# Patient Record
Sex: FEMALE | Race: WHITE | NOT HISPANIC OR LATINO | Employment: OTHER | URBAN - METROPOLITAN AREA
[De-identification: names, ages, dates, MRNs, and addresses within clinical notes are randomized per-mention and may not be internally consistent; named-entity substitution may affect disease eponyms.]

---

## 2020-04-17 RX ORDER — TRIAMCINOLONE ACETONIDE 5 MG/G
CREAM TOPICAL
Qty: 15 G | Refills: 0 | Status: SHIPPED | OUTPATIENT
Start: 2020-04-17

## 2021-01-01 ENCOUNTER — HOSPITAL ENCOUNTER (INPATIENT)
Facility: HOSPITAL | Age: 71
LOS: 1 days | End: 2021-12-09
Attending: INTERNAL MEDICINE | Admitting: FAMILY MEDICINE

## 2021-01-01 ENCOUNTER — APPOINTMENT (OUTPATIENT)
Dept: GENERAL RADIOLOGY | Facility: HOSPITAL | Age: 71
End: 2021-01-01

## 2021-01-01 ENCOUNTER — APPOINTMENT (OUTPATIENT)
Dept: CARDIOLOGY | Facility: HOSPITAL | Age: 71
End: 2021-01-01

## 2021-01-01 ENCOUNTER — APPOINTMENT (OUTPATIENT)
Dept: CT IMAGING | Facility: HOSPITAL | Age: 71
End: 2021-01-01

## 2021-01-01 ENCOUNTER — APPOINTMENT (OUTPATIENT)
Dept: MRI IMAGING | Facility: HOSPITAL | Age: 71
End: 2021-01-01

## 2021-01-01 ENCOUNTER — HOSPITAL ENCOUNTER (INPATIENT)
Facility: HOSPITAL | Age: 71
LOS: 9 days | Discharge: HOSPICE | End: 2021-12-08
Attending: INTERNAL MEDICINE | Admitting: INTERNAL MEDICINE

## 2021-01-01 ENCOUNTER — APPOINTMENT (OUTPATIENT)
Dept: ULTRASOUND IMAGING | Facility: HOSPITAL | Age: 71
End: 2021-01-01

## 2021-01-01 VITALS — BODY MASS INDEX: 39.08 KG/M2 | WEIGHT: 249 LBS | OXYGEN SATURATION: 72 %

## 2021-01-01 VITALS
BODY MASS INDEX: 39.24 KG/M2 | SYSTOLIC BLOOD PRESSURE: 127 MMHG | HEIGHT: 67 IN | RESPIRATION RATE: 28 BRPM | TEMPERATURE: 97.5 F | DIASTOLIC BLOOD PRESSURE: 91 MMHG | OXYGEN SATURATION: 91 % | HEART RATE: 80 BPM | WEIGHT: 250 LBS

## 2021-01-01 DIAGNOSIS — U07.1 COVID-19 VIRUS DETECTED: ICD-10-CM

## 2021-01-01 DIAGNOSIS — R13.11 ORAL PHASE DYSPHAGIA: ICD-10-CM

## 2021-01-01 DIAGNOSIS — R47.1 DYSARTHRIA: Primary | ICD-10-CM

## 2021-01-01 LAB
ALBUMIN SERPL-MCNC: 2.3 G/DL (ref 3.5–5.2)
ALBUMIN SERPL-MCNC: 2.5 G/DL (ref 3.5–5.2)
ALBUMIN SERPL-MCNC: 2.5 G/DL (ref 3.5–5.2)
ALBUMIN SERPL-MCNC: 2.9 G/DL (ref 3.5–5.2)
ALBUMIN SERPL-MCNC: 3 G/DL (ref 3.5–5.2)
ALBUMIN/GLOB SERPL: 0.6 G/DL
ALBUMIN/GLOB SERPL: 0.7 G/DL
ALBUMIN/GLOB SERPL: 0.8 G/DL
ALBUMIN/GLOB SERPL: 0.8 G/DL
ALP SERPL-CCNC: 104 U/L (ref 39–117)
ALP SERPL-CCNC: 108 U/L (ref 39–117)
ALP SERPL-CCNC: 119 U/L (ref 39–117)
ALP SERPL-CCNC: 121 U/L (ref 39–117)
ALP SERPL-CCNC: 121 U/L (ref 39–117)
ALT SERPL W P-5'-P-CCNC: 11 U/L (ref 1–33)
ALT SERPL W P-5'-P-CCNC: 14 U/L (ref 1–33)
ALT SERPL W P-5'-P-CCNC: 26 U/L (ref 1–33)
ALT SERPL W P-5'-P-CCNC: 27 U/L (ref 1–33)
ALT SERPL W P-5'-P-CCNC: 27 U/L (ref 1–33)
AMPHET+METHAMPHET UR QL: NEGATIVE
AMPHETAMINES UR QL: NEGATIVE
ANION GAP SERPL CALCULATED.3IONS-SCNC: 10 MMOL/L (ref 5–15)
ANION GAP SERPL CALCULATED.3IONS-SCNC: 10 MMOL/L (ref 5–15)
ANION GAP SERPL CALCULATED.3IONS-SCNC: 11 MMOL/L (ref 5–15)
ANION GAP SERPL CALCULATED.3IONS-SCNC: 13 MMOL/L (ref 5–15)
ANION GAP SERPL CALCULATED.3IONS-SCNC: 13 MMOL/L (ref 5–15)
ANION GAP SERPL CALCULATED.3IONS-SCNC: 14 MMOL/L (ref 5–15)
ANION GAP SERPL CALCULATED.3IONS-SCNC: 18 MMOL/L (ref 5–15)
ANION GAP SERPL CALCULATED.3IONS-SCNC: 9 MMOL/L (ref 5–15)
APTT PPP: 35.6 SECONDS (ref 22–39)
ASCENDING AORTA: 3.2 CM
AST SERPL-CCNC: 20 U/L (ref 1–32)
AST SERPL-CCNC: 21 U/L (ref 1–32)
AST SERPL-CCNC: 88 U/L (ref 1–32)
B PARAPERT DNA SPEC QL NAA+PROBE: NOT DETECTED
B PERT DNA SPEC QL NAA+PROBE: NOT DETECTED
BACTERIA SPEC AEROBE CULT: NORMAL
BACTERIA UR QL AUTO: ABNORMAL /HPF
BARBITURATES UR QL SCN: NEGATIVE
BASOPHILS # BLD AUTO: 0.01 10*3/MM3 (ref 0–0.2)
BASOPHILS # BLD AUTO: 0.01 10*3/MM3 (ref 0–0.2)
BASOPHILS # BLD MANUAL: 0 10*3/MM3 (ref 0–0.2)
BASOPHILS NFR BLD AUTO: 0.1 % (ref 0–1.5)
BASOPHILS NFR BLD AUTO: 0.3 % (ref 0–1.5)
BASOPHILS NFR BLD MANUAL: 0 % (ref 0–1.5)
BENZODIAZ UR QL SCN: NEGATIVE
BH CV ECHO MEAS - AO MAX PG (FULL): 5.1 MMHG
BH CV ECHO MEAS - AO MAX PG: 7.3 MMHG
BH CV ECHO MEAS - AO MEAN PG (FULL): 2.2 MMHG
BH CV ECHO MEAS - AO MEAN PG: 3.4 MMHG
BH CV ECHO MEAS - AO ROOT AREA (BSA CORRECTED): 1.4
BH CV ECHO MEAS - AO ROOT AREA: 8.3 CM^2
BH CV ECHO MEAS - AO ROOT DIAM: 3.3 CM
BH CV ECHO MEAS - AO V2 MAX: 134.7 CM/SEC
BH CV ECHO MEAS - AO V2 MEAN: 81.4 CM/SEC
BH CV ECHO MEAS - AO V2 VTI: 24.6 CM
BH CV ECHO MEAS - ASC AORTA: 3.2 CM
BH CV ECHO MEAS - AVA(I,A): 2 CM^2
BH CV ECHO MEAS - AVA(I,D): 2 CM^2
BH CV ECHO MEAS - AVA(V,A): 2 CM^2
BH CV ECHO MEAS - AVA(V,D): 2 CM^2
BH CV ECHO MEAS - BSA(HAYCOCK): 2.5 M^2
BH CV ECHO MEAS - BSA: 2.3 M^2
BH CV ECHO MEAS - BZI_BMI: 42.3 KILOGRAMS/M^2
BH CV ECHO MEAS - BZI_METRIC_HEIGHT: 170.2 CM
BH CV ECHO MEAS - BZI_METRIC_WEIGHT: 122.5 KG
BH CV ECHO MEAS - EDV(CUBED): 167.3 ML
BH CV ECHO MEAS - EDV(MOD-SP2): 156 ML
BH CV ECHO MEAS - EDV(MOD-SP4): 136 ML
BH CV ECHO MEAS - EDV(TEICH): 148 ML
BH CV ECHO MEAS - EF(CUBED): 62.3 %
BH CV ECHO MEAS - EF(MOD-BP): 54 %
BH CV ECHO MEAS - EF(MOD-SP2): 59 %
BH CV ECHO MEAS - EF(MOD-SP4): 45.6 %
BH CV ECHO MEAS - EF(TEICH): 53.3 %
BH CV ECHO MEAS - ESV(CUBED): 63 ML
BH CV ECHO MEAS - ESV(MOD-SP2): 64 ML
BH CV ECHO MEAS - ESV(MOD-SP4): 74 ML
BH CV ECHO MEAS - ESV(TEICH): 69.1 ML
BH CV ECHO MEAS - FS: 27.8 %
BH CV ECHO MEAS - IVS/LVPW: 0.8
BH CV ECHO MEAS - IVSD: 1 CM
BH CV ECHO MEAS - LA DIMENSION: 4.4 CM
BH CV ECHO MEAS - LA/AO: 1.4
BH CV ECHO MEAS - LAD MAJOR: 5.4 CM
BH CV ECHO MEAS - LAT PEAK E' VEL: 9.7 CM/SEC
BH CV ECHO MEAS - LATERAL E/E' RATIO: 10.8
BH CV ECHO MEAS - LV DIASTOLIC VOL/BSA (35-75): 59.2 ML/M^2
BH CV ECHO MEAS - LV IVRT: 0.12 SEC
BH CV ECHO MEAS - LV MASS(C)D: 255.1 GRAMS
BH CV ECHO MEAS - LV MASS(C)DI: 111.1 GRAMS/M^2
BH CV ECHO MEAS - LV MAX PG: 2.1 MMHG
BH CV ECHO MEAS - LV MEAN PG: 1.1 MMHG
BH CV ECHO MEAS - LV SYSTOLIC VOL/BSA (12-30): 32.2 ML/M^2
BH CV ECHO MEAS - LV V1 MAX: 72.6 CM/SEC
BH CV ECHO MEAS - LV V1 MEAN: 50.3 CM/SEC
BH CV ECHO MEAS - LV V1 VTI: 13.5 CM
BH CV ECHO MEAS - LVIDD: 5.5 CM
BH CV ECHO MEAS - LVIDS: 4 CM
BH CV ECHO MEAS - LVLD AP2: 9.4 CM
BH CV ECHO MEAS - LVLD AP4: 8.1 CM
BH CV ECHO MEAS - LVLS AP2: 7.7 CM
BH CV ECHO MEAS - LVLS AP4: 7.3 CM
BH CV ECHO MEAS - LVOT AREA (M): 3.8 CM^2
BH CV ECHO MEAS - LVOT AREA: 3.7 CM^2
BH CV ECHO MEAS - LVOT DIAM: 2.2 CM
BH CV ECHO MEAS - LVPWD: 1.3 CM
BH CV ECHO MEAS - MED PEAK E' VEL: 3.9 CM/SEC
BH CV ECHO MEAS - MEDIAL E/E' RATIO: 25.9
BH CV ECHO MEAS - MV A MAX VEL: 62.7 CM/SEC
BH CV ECHO MEAS - MV DEC SLOPE: 374.9 CM/SEC^2
BH CV ECHO MEAS - MV DEC TIME: 0.17 SEC
BH CV ECHO MEAS - MV E MAX VEL: 105.1 CM/SEC
BH CV ECHO MEAS - MV E/A: 1.7
BH CV ECHO MEAS - MV MAX PG: 3.7 MMHG
BH CV ECHO MEAS - MV MEAN PG: 1.6 MMHG
BH CV ECHO MEAS - MV P1/2T MAX VEL: 104.2 CM/SEC
BH CV ECHO MEAS - MV P1/2T: 81.4 MSEC
BH CV ECHO MEAS - MV V2 MAX: 95.6 CM/SEC
BH CV ECHO MEAS - MV V2 MEAN: 59.5 CM/SEC
BH CV ECHO MEAS - MV V2 VTI: 29.5 CM
BH CV ECHO MEAS - MVA P1/2T LCG: 2.1 CM^2
BH CV ECHO MEAS - MVA(P1/2T): 2.7 CM^2
BH CV ECHO MEAS - MVA(VTI): 1.7 CM^2
BH CV ECHO MEAS - PA ACC SLOPE: 883.4 CM/SEC^2
BH CV ECHO MEAS - PA ACC TIME: 0.1 SEC
BH CV ECHO MEAS - PA MAX PG: 3.8 MMHG
BH CV ECHO MEAS - PA PR(ACCEL): 33.8 MMHG
BH CV ECHO MEAS - PA V2 MAX: 97.7 CM/SEC
BH CV ECHO MEAS - RAP SYSTOLE: 15 MMHG
BH CV ECHO MEAS - RVSP: 53 MMHG
BH CV ECHO MEAS - SI(AO): 89.6 ML/M^2
BH CV ECHO MEAS - SI(CUBED): 45.4 ML/M^2
BH CV ECHO MEAS - SI(LVOT): 21.6 ML/M^2
BH CV ECHO MEAS - SI(MOD-SP2): 40 ML/M^2
BH CV ECHO MEAS - SI(MOD-SP4): 27 ML/M^2
BH CV ECHO MEAS - SI(TEICH): 34.4 ML/M^2
BH CV ECHO MEAS - SV(AO): 205.8 ML
BH CV ECHO MEAS - SV(CUBED): 104.3 ML
BH CV ECHO MEAS - SV(LVOT): 49.6 ML
BH CV ECHO MEAS - SV(MOD-SP2): 92 ML
BH CV ECHO MEAS - SV(MOD-SP4): 62 ML
BH CV ECHO MEAS - SV(TEICH): 78.9 ML
BH CV ECHO MEAS - TAPSE (>1.6): 1.6 CM
BH CV ECHO MEAS - TR MAX PG: 38 MMHG
BH CV ECHO MEAS - TR MAX VEL: 308 CM/SEC
BH CV ECHO MEASUREMENTS AVERAGE E/E' RATIO: 15.46
BH CV VAS BP RIGHT ARM: NORMAL MMHG
BH CV XLRA - RV BASE: 3.4 CM
BH CV XLRA - RV LENGTH: 6.3 CM
BH CV XLRA - RV MID: 2.9 CM
BH CV XLRA - TDI S': 6.6 CM/SEC
BILIRUB CONJ SERPL-MCNC: 0.5 MG/DL (ref 0–0.3)
BILIRUB CONJ SERPL-MCNC: 0.7 MG/DL (ref 0–0.3)
BILIRUB INDIRECT SERPL-MCNC: 0.5 MG/DL
BILIRUB SERPL-MCNC: 0.6 MG/DL (ref 0–1.2)
BILIRUB SERPL-MCNC: 0.6 MG/DL (ref 0–1.2)
BILIRUB SERPL-MCNC: 1 MG/DL (ref 0–1.2)
BILIRUB SERPL-MCNC: 1 MG/DL (ref 0–1.2)
BILIRUB SERPL-MCNC: 1.3 MG/DL (ref 0–1.2)
BILIRUB UR QL STRIP: NEGATIVE
BUN SERPL-MCNC: 31 MG/DL (ref 8–23)
BUN SERPL-MCNC: 32 MG/DL (ref 8–23)
BUN SERPL-MCNC: 38 MG/DL (ref 8–23)
BUN SERPL-MCNC: 42 MG/DL (ref 8–23)
BUN SERPL-MCNC: 43 MG/DL (ref 8–23)
BUN SERPL-MCNC: 49 MG/DL (ref 8–23)
BUN SERPL-MCNC: 64 MG/DL (ref 8–23)
BUN SERPL-MCNC: 67 MG/DL (ref 8–23)
BUN/CREAT SERPL: 20.2 (ref 7–25)
BUN/CREAT SERPL: 20.3 (ref 7–25)
BUN/CREAT SERPL: 21.1 (ref 7–25)
BUN/CREAT SERPL: 24.3 (ref 7–25)
BUN/CREAT SERPL: 24.6 (ref 7–25)
BUN/CREAT SERPL: 25.5 (ref 7–25)
BUN/CREAT SERPL: 27.9 (ref 7–25)
BUN/CREAT SERPL: 29.5 (ref 7–25)
BUPRENORPHINE SERPL-MCNC: NEGATIVE NG/ML
C PNEUM DNA NPH QL NAA+NON-PROBE: NOT DETECTED
CALCIUM SPEC-SCNC: 7.9 MG/DL (ref 8.6–10.5)
CALCIUM SPEC-SCNC: 8 MG/DL (ref 8.6–10.5)
CALCIUM SPEC-SCNC: 8.1 MG/DL (ref 8.6–10.5)
CALCIUM SPEC-SCNC: 8.1 MG/DL (ref 8.6–10.5)
CALCIUM SPEC-SCNC: 8.2 MG/DL (ref 8.6–10.5)
CALCIUM SPEC-SCNC: 8.2 MG/DL (ref 8.6–10.5)
CALCIUM SPEC-SCNC: 8.5 MG/DL (ref 8.6–10.5)
CALCIUM SPEC-SCNC: 8.6 MG/DL (ref 8.6–10.5)
CANNABINOIDS SERPL QL: NEGATIVE
CHLORIDE SERPL-SCNC: 101 MMOL/L (ref 98–107)
CHLORIDE SERPL-SCNC: 102 MMOL/L (ref 98–107)
CHLORIDE SERPL-SCNC: 102 MMOL/L (ref 98–107)
CHLORIDE SERPL-SCNC: 103 MMOL/L (ref 98–107)
CHLORIDE SERPL-SCNC: 105 MMOL/L (ref 98–107)
CHLORIDE SERPL-SCNC: 106 MMOL/L (ref 98–107)
CHLORIDE SERPL-SCNC: 109 MMOL/L (ref 98–107)
CHLORIDE SERPL-SCNC: 112 MMOL/L (ref 98–107)
CHOLEST SERPL-MCNC: 135 MG/DL (ref 0–200)
CLARITY UR: CLEAR
CO2 SERPL-SCNC: 20 MMOL/L (ref 22–29)
CO2 SERPL-SCNC: 21 MMOL/L (ref 22–29)
CO2 SERPL-SCNC: 21 MMOL/L (ref 22–29)
CO2 SERPL-SCNC: 23 MMOL/L (ref 22–29)
CO2 SERPL-SCNC: 24 MMOL/L (ref 22–29)
CO2 SERPL-SCNC: 26 MMOL/L (ref 22–29)
COCAINE UR QL: NEGATIVE
COLOR UR: YELLOW
CREAT SERPL-MCNC: 1.47 MG/DL (ref 0.57–1)
CREAT SERPL-MCNC: 1.54 MG/DL (ref 0.57–1)
CREAT SERPL-MCNC: 1.58 MG/DL (ref 0.57–1)
CREAT SERPL-MCNC: 1.66 MG/DL (ref 0.57–1)
CREAT SERPL-MCNC: 1.73 MG/DL (ref 0.57–1)
CREAT SERPL-MCNC: 1.88 MG/DL (ref 0.57–1)
CREAT SERPL-MCNC: 2.51 MG/DL (ref 0.57–1)
CREAT SERPL-MCNC: 2.59 MG/DL (ref 0.57–1)
CREAT SERPL-MCNC: 2.72 MG/DL (ref 0.57–1)
CREAT UR-MCNC: 130.3 MG/DL
CRP SERPL-MCNC: 2.11 MG/DL (ref 0–0.5)
CRP SERPL-MCNC: 21.12 MG/DL (ref 0–0.5)
CRP SERPL-MCNC: 3.21 MG/DL (ref 0–0.5)
CRP SERPL-MCNC: 31.46 MG/DL (ref 0–0.5)
CRP SERPL-MCNC: 5.83 MG/DL (ref 0–0.5)
D DIMER PPP FEU-MCNC: 1.08 MCGFEU/ML (ref 0–0.56)
D DIMER PPP FEU-MCNC: 1.7 MCGFEU/ML (ref 0–0.56)
D-LACTATE SERPL-SCNC: 1.5 MMOL/L (ref 0.5–2)
DEPRECATED RDW RBC AUTO: 50.4 FL (ref 37–54)
DEPRECATED RDW RBC AUTO: 50.4 FL (ref 37–54)
DEPRECATED RDW RBC AUTO: 50.5 FL (ref 37–54)
DEPRECATED RDW RBC AUTO: 52 FL (ref 37–54)
DEPRECATED RDW RBC AUTO: 52.9 FL (ref 37–54)
DEPRECATED RDW RBC AUTO: 53.3 FL (ref 37–54)
DEPRECATED RDW RBC AUTO: 54 FL (ref 37–54)
EOSINOPHIL # BLD AUTO: 0.01 10*3/MM3 (ref 0–0.4)
EOSINOPHIL # BLD AUTO: 0.12 10*3/MM3 (ref 0–0.4)
EOSINOPHIL # BLD MANUAL: 0 10*3/MM3 (ref 0–0.4)
EOSINOPHIL NFR BLD AUTO: 0.1 % (ref 0.3–6.2)
EOSINOPHIL NFR BLD AUTO: 3.1 % (ref 0.3–6.2)
EOSINOPHIL NFR BLD MANUAL: 0 % (ref 0.3–6.2)
ERYTHROCYTE [DISTWIDTH] IN BLOOD BY AUTOMATED COUNT: 16.7 % (ref 12.3–15.4)
ERYTHROCYTE [DISTWIDTH] IN BLOOD BY AUTOMATED COUNT: 16.9 % (ref 12.3–15.4)
ERYTHROCYTE [DISTWIDTH] IN BLOOD BY AUTOMATED COUNT: 16.9 % (ref 12.3–15.4)
ERYTHROCYTE [DISTWIDTH] IN BLOOD BY AUTOMATED COUNT: 17.1 % (ref 12.3–15.4)
ERYTHROCYTE [DISTWIDTH] IN BLOOD BY AUTOMATED COUNT: 17.4 % (ref 12.3–15.4)
ERYTHROCYTE [SEDIMENTATION RATE] IN BLOOD: 37 MM/HR (ref 0–30)
FERRITIN SERPL-MCNC: 457.2 NG/ML (ref 13–150)
FLUAV SUBTYP SPEC NAA+PROBE: NOT DETECTED
FLUBV RNA ISLT QL NAA+PROBE: NOT DETECTED
GFR SERPL CREATININE-BSD FRML MDRD: 17 ML/MIN/1.73
GFR SERPL CREATININE-BSD FRML MDRD: 18 ML/MIN/1.73
GFR SERPL CREATININE-BSD FRML MDRD: 19 ML/MIN/1.73
GFR SERPL CREATININE-BSD FRML MDRD: 26 ML/MIN/1.73
GFR SERPL CREATININE-BSD FRML MDRD: 29 ML/MIN/1.73
GFR SERPL CREATININE-BSD FRML MDRD: 30 ML/MIN/1.73
GFR SERPL CREATININE-BSD FRML MDRD: 32 ML/MIN/1.73
GFR SERPL CREATININE-BSD FRML MDRD: 33 ML/MIN/1.73
GFR SERPL CREATININE-BSD FRML MDRD: 35 ML/MIN/1.73
GLOBULIN UR ELPH-MCNC: 3.6 GM/DL
GLOBULIN UR ELPH-MCNC: 3.9 GM/DL
GLUCOSE BLDC GLUCOMTR-MCNC: 102 MG/DL (ref 70–130)
GLUCOSE BLDC GLUCOMTR-MCNC: 120 MG/DL (ref 70–130)
GLUCOSE BLDC GLUCOMTR-MCNC: 127 MG/DL (ref 70–130)
GLUCOSE BLDC GLUCOMTR-MCNC: 132 MG/DL (ref 70–130)
GLUCOSE BLDC GLUCOMTR-MCNC: 133 MG/DL (ref 70–130)
GLUCOSE BLDC GLUCOMTR-MCNC: 143 MG/DL (ref 70–130)
GLUCOSE BLDC GLUCOMTR-MCNC: 152 MG/DL (ref 70–130)
GLUCOSE BLDC GLUCOMTR-MCNC: 154 MG/DL (ref 70–130)
GLUCOSE BLDC GLUCOMTR-MCNC: 155 MG/DL (ref 70–130)
GLUCOSE BLDC GLUCOMTR-MCNC: 155 MG/DL (ref 70–130)
GLUCOSE BLDC GLUCOMTR-MCNC: 165 MG/DL (ref 70–130)
GLUCOSE BLDC GLUCOMTR-MCNC: 170 MG/DL (ref 70–130)
GLUCOSE BLDC GLUCOMTR-MCNC: 171 MG/DL (ref 70–130)
GLUCOSE BLDC GLUCOMTR-MCNC: 173 MG/DL (ref 70–130)
GLUCOSE BLDC GLUCOMTR-MCNC: 175 MG/DL (ref 70–130)
GLUCOSE BLDC GLUCOMTR-MCNC: 176 MG/DL (ref 70–130)
GLUCOSE BLDC GLUCOMTR-MCNC: 176 MG/DL (ref 70–130)
GLUCOSE BLDC GLUCOMTR-MCNC: 185 MG/DL (ref 70–130)
GLUCOSE BLDC GLUCOMTR-MCNC: 187 MG/DL (ref 70–130)
GLUCOSE BLDC GLUCOMTR-MCNC: 188 MG/DL (ref 70–130)
GLUCOSE BLDC GLUCOMTR-MCNC: 192 MG/DL (ref 70–130)
GLUCOSE BLDC GLUCOMTR-MCNC: 195 MG/DL (ref 70–130)
GLUCOSE BLDC GLUCOMTR-MCNC: 196 MG/DL (ref 70–130)
GLUCOSE BLDC GLUCOMTR-MCNC: 199 MG/DL (ref 70–130)
GLUCOSE BLDC GLUCOMTR-MCNC: 202 MG/DL (ref 70–130)
GLUCOSE BLDC GLUCOMTR-MCNC: 205 MG/DL (ref 70–130)
GLUCOSE BLDC GLUCOMTR-MCNC: 206 MG/DL (ref 70–130)
GLUCOSE BLDC GLUCOMTR-MCNC: 206 MG/DL (ref 70–130)
GLUCOSE BLDC GLUCOMTR-MCNC: 209 MG/DL (ref 70–130)
GLUCOSE BLDC GLUCOMTR-MCNC: 210 MG/DL (ref 70–130)
GLUCOSE BLDC GLUCOMTR-MCNC: 212 MG/DL (ref 70–130)
GLUCOSE BLDC GLUCOMTR-MCNC: 214 MG/DL (ref 70–130)
GLUCOSE BLDC GLUCOMTR-MCNC: 217 MG/DL (ref 70–130)
GLUCOSE BLDC GLUCOMTR-MCNC: 220 MG/DL (ref 70–130)
GLUCOSE BLDC GLUCOMTR-MCNC: 221 MG/DL (ref 70–130)
GLUCOSE BLDC GLUCOMTR-MCNC: 224 MG/DL (ref 70–130)
GLUCOSE BLDC GLUCOMTR-MCNC: 230 MG/DL (ref 70–130)
GLUCOSE BLDC GLUCOMTR-MCNC: 232 MG/DL (ref 70–130)
GLUCOSE BLDC GLUCOMTR-MCNC: 249 MG/DL (ref 70–130)
GLUCOSE BLDC GLUCOMTR-MCNC: 93 MG/DL (ref 70–130)
GLUCOSE SERPL-MCNC: 118 MG/DL (ref 65–99)
GLUCOSE SERPL-MCNC: 152 MG/DL (ref 65–99)
GLUCOSE SERPL-MCNC: 178 MG/DL (ref 65–99)
GLUCOSE SERPL-MCNC: 182 MG/DL (ref 65–99)
GLUCOSE SERPL-MCNC: 183 MG/DL (ref 65–99)
GLUCOSE SERPL-MCNC: 185 MG/DL (ref 65–99)
GLUCOSE SERPL-MCNC: 198 MG/DL (ref 65–99)
GLUCOSE SERPL-MCNC: 236 MG/DL (ref 65–99)
GLUCOSE UR STRIP-MCNC: ABNORMAL MG/DL
HADV DNA SPEC NAA+PROBE: NOT DETECTED
HBA1C MFR BLD: 8.3 % (ref 4.8–5.6)
HCOV 229E RNA SPEC QL NAA+PROBE: NOT DETECTED
HCOV HKU1 RNA SPEC QL NAA+PROBE: NOT DETECTED
HCOV NL63 RNA SPEC QL NAA+PROBE: NOT DETECTED
HCOV OC43 RNA SPEC QL NAA+PROBE: NOT DETECTED
HCT VFR BLD AUTO: 27.6 % (ref 34–46.6)
HCT VFR BLD AUTO: 28.6 % (ref 34–46.6)
HCT VFR BLD AUTO: 28.9 % (ref 34–46.6)
HCT VFR BLD AUTO: 35.1 % (ref 34–46.6)
HCT VFR BLD AUTO: 37.9 % (ref 34–46.6)
HCT VFR BLD AUTO: 38.7 % (ref 34–46.6)
HCT VFR BLD AUTO: 40.3 % (ref 34–46.6)
HDLC SERPL-MCNC: 34 MG/DL (ref 40–60)
HGB BLD-MCNC: 11.5 G/DL (ref 12–15.9)
HGB BLD-MCNC: 12.4 G/DL (ref 12–15.9)
HGB BLD-MCNC: 12.4 G/DL (ref 12–15.9)
HGB BLD-MCNC: 12.7 G/DL (ref 12–15.9)
HGB BLD-MCNC: 8.9 G/DL (ref 12–15.9)
HGB BLD-MCNC: 9 G/DL (ref 12–15.9)
HGB BLD-MCNC: 9.3 G/DL (ref 12–15.9)
HGB UR QL STRIP.AUTO: ABNORMAL
HMPV RNA NPH QL NAA+NON-PROBE: NOT DETECTED
HPIV1 RNA ISLT QL NAA+PROBE: NOT DETECTED
HPIV2 RNA SPEC QL NAA+PROBE: NOT DETECTED
HPIV3 RNA NPH QL NAA+PROBE: NOT DETECTED
HPIV4 P GENE NPH QL NAA+PROBE: NOT DETECTED
HYALINE CASTS UR QL AUTO: ABNORMAL /LPF
HYPOCHROMIA BLD QL: ABNORMAL
IMM GRANULOCYTES # BLD AUTO: 0.01 10*3/MM3 (ref 0–0.05)
IMM GRANULOCYTES # BLD AUTO: 0.15 10*3/MM3 (ref 0–0.05)
IMM GRANULOCYTES NFR BLD AUTO: 0.3 % (ref 0–0.5)
IMM GRANULOCYTES NFR BLD AUTO: 1.2 % (ref 0–0.5)
INR PPP: 1.03 (ref 0.85–1.16)
INR PPP: 2.28 (ref 0.85–1.16)
IVRT: 116 MSEC
KETONES UR QL STRIP: NEGATIVE
LDH SERPL-CCNC: 200 U/L (ref 135–214)
LDLC SERPL CALC-MCNC: 81 MG/DL (ref 0–100)
LDLC/HDLC SERPL: 2.35 {RATIO}
LEFT ATRIUM VOLUME INDEX: 29.6 ML/M^2
LEFT ATRIUM VOLUME: 68 ML
LEUKOCYTE ESTERASE UR QL STRIP.AUTO: NEGATIVE
LV EF 2D ECHO EST: 52 %
LYMPHOCYTES # BLD AUTO: 0.39 10*3/MM3 (ref 0.7–3.1)
LYMPHOCYTES # BLD AUTO: 0.73 10*3/MM3 (ref 0.7–3.1)
LYMPHOCYTES # BLD MANUAL: 0.24 10*3/MM3 (ref 0.7–3.1)
LYMPHOCYTES NFR BLD AUTO: 18.9 % (ref 19.6–45.3)
LYMPHOCYTES NFR BLD AUTO: 3.1 % (ref 19.6–45.3)
LYMPHOCYTES NFR BLD MANUAL: 0 % (ref 5–12)
M PNEUMO IGG SER IA-ACNC: NOT DETECTED
MAGNESIUM SERPL-MCNC: 1.9 MG/DL (ref 1.6–2.4)
MAGNESIUM SERPL-MCNC: 2.3 MG/DL (ref 1.6–2.4)
MCH RBC QN AUTO: 26.3 PG (ref 26.6–33)
MCH RBC QN AUTO: 26.6 PG (ref 26.6–33)
MCH RBC QN AUTO: 26.7 PG (ref 26.6–33)
MCH RBC QN AUTO: 27.1 PG (ref 26.6–33)
MCH RBC QN AUTO: 27.2 PG (ref 26.6–33)
MCH RBC QN AUTO: 27.3 PG (ref 26.6–33)
MCH RBC QN AUTO: 27.3 PG (ref 26.6–33)
MCHC RBC AUTO-ENTMCNC: 30.8 G/DL (ref 31.5–35.7)
MCHC RBC AUTO-ENTMCNC: 31.5 G/DL (ref 31.5–35.7)
MCHC RBC AUTO-ENTMCNC: 32 G/DL (ref 31.5–35.7)
MCHC RBC AUTO-ENTMCNC: 32.5 G/DL (ref 31.5–35.7)
MCHC RBC AUTO-ENTMCNC: 32.6 G/DL (ref 31.5–35.7)
MCHC RBC AUTO-ENTMCNC: 32.7 G/DL (ref 31.5–35.7)
MCHC RBC AUTO-ENTMCNC: 32.8 G/DL (ref 31.5–35.7)
MCV RBC AUTO: 81.9 FL (ref 79–97)
MCV RBC AUTO: 81.9 FL (ref 79–97)
MCV RBC AUTO: 83 FL (ref 79–97)
MCV RBC AUTO: 83.5 FL (ref 79–97)
MCV RBC AUTO: 85.1 FL (ref 79–97)
MCV RBC AUTO: 85.5 FL (ref 79–97)
MCV RBC AUTO: 86.1 FL (ref 79–97)
METAMYELOCYTES NFR BLD MANUAL: 3 % (ref 0–0)
METHADONE UR QL SCN: NEGATIVE
MONOCYTES # BLD AUTO: 0.54 10*3/MM3 (ref 0.1–0.9)
MONOCYTES # BLD AUTO: 0.56 10*3/MM3 (ref 0.1–0.9)
MONOCYTES # BLD: 0 10*3/MM3 (ref 0.1–0.9)
MONOCYTES NFR BLD AUTO: 14 % (ref 5–12)
MONOCYTES NFR BLD AUTO: 4.5 % (ref 5–12)
NEUTROPHILS # BLD AUTO: 11.5 10*3/MM3 (ref 1.7–7)
NEUTROPHILS NFR BLD AUTO: 11.46 10*3/MM3 (ref 1.7–7)
NEUTROPHILS NFR BLD AUTO: 2.45 10*3/MM3 (ref 1.7–7)
NEUTROPHILS NFR BLD AUTO: 63.4 % (ref 42.7–76)
NEUTROPHILS NFR BLD AUTO: 91 % (ref 42.7–76)
NEUTROPHILS NFR BLD MANUAL: 74 % (ref 42.7–76)
NEUTS BAND NFR BLD MANUAL: 21 % (ref 0–5)
NITRITE UR QL STRIP: NEGATIVE
NRBC BLD AUTO-RTO: 0 /100 WBC (ref 0–0.2)
NRBC BLD AUTO-RTO: 0.2 /100 WBC (ref 0–0.2)
NRBC SPEC MANUAL: 1 /100 WBC (ref 0–0.2)
NT-PROBNP SERPL-MCNC: 7820 PG/ML (ref 0–900)
NT-PROBNP SERPL-MCNC: ABNORMAL PG/ML (ref 0–900)
OPIATES UR QL: NEGATIVE
OXYCODONE UR QL SCN: NEGATIVE
PCP UR QL SCN: NEGATIVE
PH UR STRIP.AUTO: <=5 [PH] (ref 5–8)
PLAT MORPH BLD: NORMAL
PLATELET # BLD AUTO: 161 10*3/MM3 (ref 140–450)
PLATELET # BLD AUTO: 162 10*3/MM3 (ref 140–450)
PLATELET # BLD AUTO: 166 10*3/MM3 (ref 140–450)
PLATELET # BLD AUTO: 184 10*3/MM3 (ref 140–450)
PLATELET # BLD AUTO: 188 10*3/MM3 (ref 140–450)
PLATELET # BLD AUTO: 198 10*3/MM3 (ref 140–450)
PLATELET # BLD AUTO: 208 10*3/MM3 (ref 140–450)
PMV BLD AUTO: 10.2 FL (ref 6–12)
PMV BLD AUTO: 10.3 FL (ref 6–12)
PMV BLD AUTO: 10.5 FL (ref 6–12)
PMV BLD AUTO: 9.4 FL (ref 6–12)
PMV BLD AUTO: 9.7 FL (ref 6–12)
PMV BLD AUTO: 9.7 FL (ref 6–12)
PMV BLD AUTO: 9.9 FL (ref 6–12)
POTASSIUM SERPL-SCNC: 4.1 MMOL/L (ref 3.5–5.2)
POTASSIUM SERPL-SCNC: 4.2 MMOL/L (ref 3.5–5.2)
POTASSIUM SERPL-SCNC: 4.3 MMOL/L (ref 3.5–5.2)
POTASSIUM SERPL-SCNC: 4.5 MMOL/L (ref 3.5–5.2)
POTASSIUM SERPL-SCNC: 4.5 MMOL/L (ref 3.5–5.2)
POTASSIUM SERPL-SCNC: 4.6 MMOL/L (ref 3.5–5.2)
POTASSIUM SERPL-SCNC: 4.7 MMOL/L (ref 3.5–5.2)
POTASSIUM SERPL-SCNC: 4.8 MMOL/L (ref 3.5–5.2)
PROCALCITONIN SERPL-MCNC: 0.18 NG/ML (ref 0–0.25)
PROPOXYPH UR QL: NEGATIVE
PROT SERPL-MCNC: 5.9 G/DL (ref 6–8.5)
PROT SERPL-MCNC: 6.1 G/DL (ref 6–8.5)
PROT SERPL-MCNC: 6.1 G/DL (ref 6–8.5)
PROT SERPL-MCNC: 6.5 G/DL (ref 6–8.5)
PROT SERPL-MCNC: 6.9 G/DL (ref 6–8.5)
PROT UR QL STRIP: ABNORMAL
PROTHROMBIN TIME: 13.2 SECONDS (ref 11.4–14.4)
PROTHROMBIN TIME: 24.2 SECONDS (ref 11.4–14.4)
QT INTERVAL: 376 MS
QT INTERVAL: 386 MS
QT INTERVAL: 422 MS
QTC INTERVAL: 452 MS
QTC INTERVAL: 454 MS
QTC INTERVAL: 557 MS
RBC # BLD AUTO: 3.37 10*6/MM3 (ref 3.77–5.28)
RBC # BLD AUTO: 3.38 10*6/MM3 (ref 3.77–5.28)
RBC # BLD AUTO: 3.49 10*6/MM3 (ref 3.77–5.28)
RBC # BLD AUTO: 4.23 10*6/MM3 (ref 3.77–5.28)
RBC # BLD AUTO: 4.54 10*6/MM3 (ref 3.77–5.28)
RBC # BLD AUTO: 4.55 10*6/MM3 (ref 3.77–5.28)
RBC # BLD AUTO: 4.68 10*6/MM3 (ref 3.77–5.28)
RBC # UR STRIP: ABNORMAL /HPF
REF LAB TEST METHOD: ABNORMAL
RHINOVIRUS RNA SPEC NAA+PROBE: NOT DETECTED
RSV RNA NPH QL NAA+NON-PROBE: NOT DETECTED
SARS-COV-2 RNA NPH QL NAA+NON-PROBE: DETECTED
SODIUM SERPL-SCNC: 133 MMOL/L (ref 136–145)
SODIUM SERPL-SCNC: 136 MMOL/L (ref 136–145)
SODIUM SERPL-SCNC: 137 MMOL/L (ref 136–145)
SODIUM SERPL-SCNC: 139 MMOL/L (ref 136–145)
SODIUM SERPL-SCNC: 139 MMOL/L (ref 136–145)
SODIUM SERPL-SCNC: 140 MMOL/L (ref 136–145)
SODIUM SERPL-SCNC: 145 MMOL/L (ref 136–145)
SODIUM SERPL-SCNC: 150 MMOL/L (ref 136–145)
SODIUM UR-SCNC: 42 MMOL/L
SP GR UR STRIP: 1.03 (ref 1–1.03)
SQUAMOUS #/AREA URNS HPF: ABNORMAL /HPF
TRICYCLICS UR QL SCN: NEGATIVE
TRIGL SERPL-MCNC: 105 MG/DL (ref 0–150)
TROPONIN T SERPL-MCNC: 0.03 NG/ML (ref 0–0.03)
TROPONIN T SERPL-MCNC: 0.04 NG/ML (ref 0–0.03)
UROBILINOGEN UR QL STRIP: ABNORMAL
UUN 24H UR-MCNC: 808 MG/DL
VARIANT LYMPHS NFR BLD MANUAL: 2 % (ref 19.6–45.3)
VLDLC SERPL-MCNC: 20 MG/DL (ref 5–40)
WBC # UR STRIP: ABNORMAL /HPF
WBC MORPH BLD: NORMAL
WBC NRBC COR # BLD: 12.11 10*3/MM3 (ref 3.4–10.8)
WBC NRBC COR # BLD: 12.58 10*3/MM3 (ref 3.4–10.8)
WBC NRBC COR # BLD: 3.86 10*3/MM3 (ref 3.4–10.8)
WBC NRBC COR # BLD: 4.67 10*3/MM3 (ref 3.4–10.8)
WBC NRBC COR # BLD: 5.24 10*3/MM3 (ref 3.4–10.8)
WBC NRBC COR # BLD: 5.45 10*3/MM3 (ref 3.4–10.8)
WBC NRBC COR # BLD: 5.49 10*3/MM3 (ref 3.4–10.8)
YEAST URNS QL MICRO: ABNORMAL /HPF

## 2021-01-01 PROCEDURE — 92523 SPEECH SOUND LANG COMPREHEN: CPT

## 2021-01-01 PROCEDURE — 92526 ORAL FUNCTION THERAPY: CPT

## 2021-01-01 PROCEDURE — 97530 THERAPEUTIC ACTIVITIES: CPT

## 2021-01-01 PROCEDURE — 63710000001 INSULIN LISPRO (HUMAN) PER 5 UNITS: Performed by: HOSPITALIST

## 2021-01-01 PROCEDURE — 80048 BASIC METABOLIC PNL TOTAL CA: CPT | Performed by: HOSPITALIST

## 2021-01-01 PROCEDURE — 82728 ASSAY OF FERRITIN: CPT | Performed by: NURSE PRACTITIONER

## 2021-01-01 PROCEDURE — 70498 CT ANGIOGRAPHY NECK: CPT

## 2021-01-01 PROCEDURE — 25010000002 PIPERACILLIN SOD-TAZOBACTAM PER 1 G: Performed by: NURSE PRACTITIONER

## 2021-01-01 PROCEDURE — 82962 GLUCOSE BLOOD TEST: CPT

## 2021-01-01 PROCEDURE — 80053 COMPREHEN METABOLIC PANEL: CPT | Performed by: NURSE PRACTITIONER

## 2021-01-01 PROCEDURE — 80076 HEPATIC FUNCTION PANEL: CPT | Performed by: NURSE PRACTITIONER

## 2021-01-01 PROCEDURE — 81001 URINALYSIS AUTO W/SCOPE: CPT | Performed by: NURSE PRACTITIONER

## 2021-01-01 PROCEDURE — 93005 ELECTROCARDIOGRAM TRACING: CPT | Performed by: INTERNAL MEDICINE

## 2021-01-01 PROCEDURE — 93010 ELECTROCARDIOGRAM REPORT: CPT | Performed by: INTERNAL MEDICINE

## 2021-01-01 PROCEDURE — 86140 C-REACTIVE PROTEIN: CPT | Performed by: NURSE PRACTITIONER

## 2021-01-01 PROCEDURE — 94799 UNLISTED PULMONARY SVC/PX: CPT

## 2021-01-01 PROCEDURE — XW033E5 INTRODUCTION OF REMDESIVIR ANTI-INFECTIVE INTO PERIPHERAL VEIN, PERCUTANEOUS APPROACH, NEW TECHNOLOGY GROUP 5: ICD-10-PCS | Performed by: INTERNAL MEDICINE

## 2021-01-01 PROCEDURE — 85379 FIBRIN DEGRADATION QUANT: CPT | Performed by: NURSE PRACTITIONER

## 2021-01-01 PROCEDURE — 25010000002 FUROSEMIDE PER 20 MG: Performed by: INTERNAL MEDICINE

## 2021-01-01 PROCEDURE — 63710000001 ONDANSETRON PER 8 MG: Performed by: HOSPITALIST

## 2021-01-01 PROCEDURE — 97110 THERAPEUTIC EXERCISES: CPT

## 2021-01-01 PROCEDURE — 63710000001 INSULIN DETEMIR PER 5 UNITS: Performed by: NURSE PRACTITIONER

## 2021-01-01 PROCEDURE — 97162 PT EVAL MOD COMPLEX 30 MIN: CPT

## 2021-01-01 PROCEDURE — 25010000002 MORPHINE PER 10 MG: Performed by: INTERNAL MEDICINE

## 2021-01-01 PROCEDURE — 63710000001 INSULIN REGULAR HUMAN PER 5 UNITS: Performed by: NURSE PRACTITIONER

## 2021-01-01 PROCEDURE — 83615 LACTATE (LD) (LDH) ENZYME: CPT | Performed by: NURSE PRACTITIONER

## 2021-01-01 PROCEDURE — 99221 1ST HOSP IP/OBS SF/LOW 40: CPT

## 2021-01-01 PROCEDURE — 70496 CT ANGIOGRAPHY HEAD: CPT

## 2021-01-01 PROCEDURE — 85025 COMPLETE CBC W/AUTO DIFF WBC: CPT | Performed by: NURSE PRACTITIONER

## 2021-01-01 PROCEDURE — 84484 ASSAY OF TROPONIN QUANT: CPT | Performed by: NURSE PRACTITIONER

## 2021-01-01 PROCEDURE — 92610 EVALUATE SWALLOWING FUNCTION: CPT | Performed by: SPEECH-LANGUAGE PATHOLOGIST

## 2021-01-01 PROCEDURE — 85610 PROTHROMBIN TIME: CPT | Performed by: NURSE PRACTITIONER

## 2021-01-01 PROCEDURE — 84540 ASSAY OF URINE/UREA-N: CPT | Performed by: HOSPITALIST

## 2021-01-01 PROCEDURE — 84300 ASSAY OF URINE SODIUM: CPT | Performed by: HOSPITALIST

## 2021-01-01 PROCEDURE — 99232 SBSQ HOSP IP/OBS MODERATE 35: CPT | Performed by: NURSE PRACTITIONER

## 2021-01-01 PROCEDURE — 80048 BASIC METABOLIC PNL TOTAL CA: CPT | Performed by: NURSE PRACTITIONER

## 2021-01-01 PROCEDURE — 82248 BILIRUBIN DIRECT: CPT | Performed by: NURSE PRACTITIONER

## 2021-01-01 PROCEDURE — 97597 DBRDMT OPN WND 1ST 20 CM/<: CPT

## 2021-01-01 PROCEDURE — 80061 LIPID PANEL: CPT | Performed by: NURSE PRACTITIONER

## 2021-01-01 PROCEDURE — 83735 ASSAY OF MAGNESIUM: CPT | Performed by: HOSPITALIST

## 2021-01-01 PROCEDURE — 25010000002 DEXAMETHASONE PER 1 MG: Performed by: NURSE PRACTITIONER

## 2021-01-01 PROCEDURE — 85007 BL SMEAR W/DIFF WBC COUNT: CPT | Performed by: NURSE PRACTITIONER

## 2021-01-01 PROCEDURE — 29580 STRAPPING UNNA BOOT: CPT

## 2021-01-01 PROCEDURE — 25010000002 FUROSEMIDE PER 20 MG: Performed by: NURSE PRACTITIONER

## 2021-01-01 PROCEDURE — 87040 BLOOD CULTURE FOR BACTERIA: CPT | Performed by: NURSE PRACTITIONER

## 2021-01-01 PROCEDURE — 70450 CT HEAD/BRAIN W/O DYE: CPT

## 2021-01-01 PROCEDURE — 85027 COMPLETE CBC AUTOMATED: CPT | Performed by: HOSPITALIST

## 2021-01-01 PROCEDURE — 99222 1ST HOSP IP/OBS MODERATE 55: CPT | Performed by: NURSE PRACTITIONER

## 2021-01-01 PROCEDURE — 99233 SBSQ HOSP IP/OBS HIGH 50: CPT | Performed by: NURSE PRACTITIONER

## 2021-01-01 PROCEDURE — 99232 SBSQ HOSP IP/OBS MODERATE 35: CPT | Performed by: INTERNAL MEDICINE

## 2021-01-01 PROCEDURE — 74230 X-RAY XM SWLNG FUNCJ C+: CPT

## 2021-01-01 PROCEDURE — 0042T HC CT CEREBRAL PERFUSION W/WO CONTRAST: CPT

## 2021-01-01 PROCEDURE — 94760 N-INVAS EAR/PLS OXIMETRY 1: CPT

## 2021-01-01 PROCEDURE — 99231 SBSQ HOSP IP/OBS SF/LOW 25: CPT | Performed by: NURSE PRACTITIONER

## 2021-01-01 PROCEDURE — 84145 PROCALCITONIN (PCT): CPT | Performed by: NURSE PRACTITIONER

## 2021-01-01 PROCEDURE — 92507 TX SP LANG VOICE COMM INDIV: CPT

## 2021-01-01 PROCEDURE — 29581 APPL MULTLAYER CMPRN SYS LEG: CPT

## 2021-01-01 PROCEDURE — 92507 TX SP LANG VOICE COMM INDIV: CPT | Performed by: SPEECH-LANGUAGE PATHOLOGIST

## 2021-01-01 PROCEDURE — 92610 EVALUATE SWALLOWING FUNCTION: CPT

## 2021-01-01 PROCEDURE — 83036 HEMOGLOBIN GLYCOSYLATED A1C: CPT | Performed by: NURSE PRACTITIONER

## 2021-01-01 PROCEDURE — 85652 RBC SED RATE AUTOMATED: CPT | Performed by: NURSE PRACTITIONER

## 2021-01-01 PROCEDURE — 92611 MOTION FLUOROSCOPY/SWALLOW: CPT

## 2021-01-01 PROCEDURE — 76775 US EXAM ABDO BACK WALL LIM: CPT

## 2021-01-01 PROCEDURE — 83880 ASSAY OF NATRIURETIC PEPTIDE: CPT | Performed by: NURSE PRACTITIONER

## 2021-01-01 PROCEDURE — 97166 OT EVAL MOD COMPLEX 45 MIN: CPT

## 2021-01-01 PROCEDURE — 85730 THROMBOPLASTIN TIME PARTIAL: CPT | Performed by: NURSE PRACTITIONER

## 2021-01-01 PROCEDURE — 94640 AIRWAY INHALATION TREATMENT: CPT

## 2021-01-01 PROCEDURE — 25010000002 ONDANSETRON PER 1 MG: Performed by: HOSPITALIST

## 2021-01-01 PROCEDURE — 86140 C-REACTIVE PROTEIN: CPT | Performed by: HOSPITALIST

## 2021-01-01 PROCEDURE — 99223 1ST HOSP IP/OBS HIGH 75: CPT | Performed by: INTERNAL MEDICINE

## 2021-01-01 PROCEDURE — 82565 ASSAY OF CREATININE: CPT | Performed by: NURSE PRACTITIONER

## 2021-01-01 PROCEDURE — 25010000002 VANCOMYCIN 10 G RECONSTITUTED SOLUTION

## 2021-01-01 PROCEDURE — 71045 X-RAY EXAM CHEST 1 VIEW: CPT

## 2021-01-01 PROCEDURE — 80306 DRUG TEST PRSMV INSTRMNT: CPT | Performed by: NURSE PRACTITIONER

## 2021-01-01 PROCEDURE — 93306 TTE W/DOPPLER COMPLETE: CPT

## 2021-01-01 PROCEDURE — 99239 HOSP IP/OBS DSCHRG MGMT >30: CPT | Performed by: INTERNAL MEDICINE

## 2021-01-01 PROCEDURE — 94761 N-INVAS EAR/PLS OXIMETRY MLT: CPT

## 2021-01-01 PROCEDURE — 85027 COMPLETE CBC AUTOMATED: CPT | Performed by: NURSE PRACTITIONER

## 2021-01-01 PROCEDURE — 99233 SBSQ HOSP IP/OBS HIGH 50: CPT | Performed by: HOSPITALIST

## 2021-01-01 PROCEDURE — 0 MAGNESIUM SULFATE 4 GM/100ML SOLUTION: Performed by: HOSPITALIST

## 2021-01-01 PROCEDURE — 93306 TTE W/DOPPLER COMPLETE: CPT | Performed by: INTERNAL MEDICINE

## 2021-01-01 PROCEDURE — 93005 ELECTROCARDIOGRAM TRACING: CPT | Performed by: NURSE PRACTITIONER

## 2021-01-01 PROCEDURE — 4A03X5D MEASUREMENT OF ARTERIAL FLOW, INTRACRANIAL, EXTERNAL APPROACH: ICD-10-PCS | Performed by: RADIOLOGY

## 2021-01-01 PROCEDURE — 87086 URINE CULTURE/COLONY COUNT: CPT | Performed by: NURSE PRACTITIONER

## 2021-01-01 PROCEDURE — 0202U NFCT DS 22 TRGT SARS-COV-2: CPT | Performed by: NURSE PRACTITIONER

## 2021-01-01 PROCEDURE — 25010000002 MORPHINE PER 10 MG: Performed by: NURSE PRACTITIONER

## 2021-01-01 PROCEDURE — 83735 ASSAY OF MAGNESIUM: CPT | Performed by: NURSE PRACTITIONER

## 2021-01-01 PROCEDURE — 93005 ELECTROCARDIOGRAM TRACING: CPT | Performed by: HOSPITALIST

## 2021-01-01 PROCEDURE — 83605 ASSAY OF LACTIC ACID: CPT | Performed by: NURSE PRACTITIONER

## 2021-01-01 PROCEDURE — 82570 ASSAY OF URINE CREATININE: CPT | Performed by: HOSPITALIST

## 2021-01-01 PROCEDURE — 97535 SELF CARE MNGMENT TRAINING: CPT

## 2021-01-01 PROCEDURE — 25010000002 LORAZEPAM PER 2 MG: Performed by: NURSE PRACTITIONER

## 2021-01-01 PROCEDURE — 84484 ASSAY OF TROPONIN QUANT: CPT | Performed by: HOSPITALIST

## 2021-01-01 PROCEDURE — 0 IOPAMIDOL PER 1 ML: Performed by: INTERNAL MEDICINE

## 2021-01-01 PROCEDURE — 97164 PT RE-EVAL EST PLAN CARE: CPT

## 2021-01-01 PROCEDURE — 80053 COMPREHEN METABOLIC PANEL: CPT | Performed by: HOSPITALIST

## 2021-01-01 RX ORDER — LACTULOSE 10 G/15ML
20 SOLUTION ORAL ONCE AS NEEDED
Status: COMPLETED | OUTPATIENT
Start: 2021-01-01 | End: 2021-01-01

## 2021-01-01 RX ORDER — ACETAMINOPHEN 325 MG/1
650 TABLET ORAL EVERY 4 HOURS PRN
Status: CANCELLED | OUTPATIENT
Start: 2021-01-01

## 2021-01-01 RX ORDER — METOPROLOL TARTRATE 5 MG/5ML
5 INJECTION INTRAVENOUS
Status: DISCONTINUED | OUTPATIENT
Start: 2021-01-01 | End: 2021-01-01

## 2021-01-01 RX ORDER — CHOLECALCIFEROL (VITAMIN D3) 125 MCG
5 CAPSULE ORAL NIGHTLY
Status: DISCONTINUED | OUTPATIENT
Start: 2021-01-01 | End: 2021-01-01

## 2021-01-01 RX ORDER — POLYETHYLENE GLYCOL 3350 17 G/17G
17 POWDER, FOR SOLUTION ORAL DAILY PRN
Status: DISCONTINUED | OUTPATIENT
Start: 2021-01-01 | End: 2021-01-01 | Stop reason: SDUPTHER

## 2021-01-01 RX ORDER — BISACODYL 10 MG
10 SUPPOSITORY, RECTAL RECTAL DAILY PRN
Status: DISCONTINUED | OUTPATIENT
Start: 2021-01-01 | End: 2021-01-01 | Stop reason: SDUPTHER

## 2021-01-01 RX ORDER — MORPHINE SULFATE 4 MG/ML
4 INJECTION, SOLUTION INTRAMUSCULAR; INTRAVENOUS
Status: DISCONTINUED | OUTPATIENT
Start: 2021-01-01 | End: 2021-01-01 | Stop reason: HOSPADM

## 2021-01-01 RX ORDER — DEXTROSE MONOHYDRATE 25 G/50ML
25 INJECTION, SOLUTION INTRAVENOUS
Status: DISCONTINUED | OUTPATIENT
Start: 2021-01-01 | End: 2021-01-01

## 2021-01-01 RX ORDER — MORPHINE SULFATE 4 MG/ML
4 INJECTION, SOLUTION INTRAMUSCULAR; INTRAVENOUS
Status: CANCELLED | OUTPATIENT
Start: 2021-01-01 | End: 2021-12-18

## 2021-01-01 RX ORDER — MAGNESIUM SULFATE HEPTAHYDRATE 40 MG/ML
2 INJECTION, SOLUTION INTRAVENOUS AS NEEDED
Status: DISCONTINUED | OUTPATIENT
Start: 2021-01-01 | End: 2021-01-01

## 2021-01-01 RX ORDER — FUROSEMIDE 10 MG/ML
40 INJECTION INTRAMUSCULAR; INTRAVENOUS ONCE
Status: COMPLETED | OUTPATIENT
Start: 2021-01-01 | End: 2021-01-01

## 2021-01-01 RX ORDER — POLYETHYLENE GLYCOL 3350 17 G/17G
17 POWDER, FOR SOLUTION ORAL DAILY
Status: DISCONTINUED | OUTPATIENT
Start: 2021-01-01 | End: 2021-01-01

## 2021-01-01 RX ORDER — ASPIRIN 81 MG/1
81 TABLET, CHEWABLE ORAL DAILY
Status: DISCONTINUED | OUTPATIENT
Start: 2021-01-01 | End: 2021-01-01

## 2021-01-01 RX ORDER — CLOPIDOGREL BISULFATE 75 MG/1
75 TABLET ORAL DAILY
Status: DISCONTINUED | OUTPATIENT
Start: 2021-01-01 | End: 2021-01-01

## 2021-01-01 RX ORDER — ONDANSETRON 2 MG/ML
4 INJECTION INTRAMUSCULAR; INTRAVENOUS EVERY 6 HOURS PRN
Status: DISCONTINUED | OUTPATIENT
Start: 2021-01-01 | End: 2021-01-01 | Stop reason: HOSPADM

## 2021-01-01 RX ORDER — AMOXICILLIN 250 MG
2 CAPSULE ORAL 2 TIMES DAILY PRN
Status: DISCONTINUED | OUTPATIENT
Start: 2021-01-01 | End: 2021-01-01

## 2021-01-01 RX ORDER — GLYCOPYRROLATE 0.2 MG/ML
0.2 INJECTION INTRAMUSCULAR; INTRAVENOUS EVERY 6 HOURS PRN
Status: DISCONTINUED | OUTPATIENT
Start: 2021-01-01 | End: 2021-01-01 | Stop reason: HOSPADM

## 2021-01-01 RX ORDER — MAGNESIUM SULFATE HEPTAHYDRATE 40 MG/ML
4 INJECTION, SOLUTION INTRAVENOUS AS NEEDED
Status: DISCONTINUED | OUTPATIENT
Start: 2021-01-01 | End: 2021-01-01

## 2021-01-01 RX ORDER — ACETAMINOPHEN 650 MG/1
650 SUPPOSITORY RECTAL EVERY 4 HOURS PRN
Status: DISCONTINUED | OUTPATIENT
Start: 2021-01-01 | End: 2021-01-01 | Stop reason: HOSPADM

## 2021-01-01 RX ORDER — BISACODYL 5 MG/1
5 TABLET, DELAYED RELEASE ORAL DAILY PRN
Status: DISCONTINUED | OUTPATIENT
Start: 2021-01-01 | End: 2021-01-01

## 2021-01-01 RX ORDER — INSULIN GLARGINE 100 [IU]/ML
44 INJECTION, SOLUTION SUBCUTANEOUS NIGHTLY
COMMUNITY

## 2021-01-01 RX ORDER — CLOPIDOGREL BISULFATE 75 MG/1
75 TABLET ORAL
COMMUNITY
End: 2021-01-01 | Stop reason: HOSPADM

## 2021-01-01 RX ORDER — SENNA PLUS 8.6 MG/1
1 TABLET ORAL
COMMUNITY

## 2021-01-01 RX ORDER — AMOXICILLIN 250 MG
2 CAPSULE ORAL 2 TIMES DAILY
Status: DISCONTINUED | OUTPATIENT
Start: 2021-01-01 | End: 2021-01-01

## 2021-01-01 RX ORDER — SODIUM CHLORIDE 0.9 % (FLUSH) 0.9 %
10 SYRINGE (ML) INJECTION AS NEEDED
Status: DISCONTINUED | OUTPATIENT
Start: 2021-01-01 | End: 2021-01-01 | Stop reason: HOSPADM

## 2021-01-01 RX ORDER — PANTOPRAZOLE SODIUM 40 MG/1
40 TABLET, DELAYED RELEASE ORAL
COMMUNITY

## 2021-01-01 RX ORDER — ASPIRIN 300 MG/1
300 SUPPOSITORY RECTAL DAILY
Status: DISCONTINUED | OUTPATIENT
Start: 2021-01-01 | End: 2021-01-01

## 2021-01-01 RX ORDER — MORPHINE SULFATE 2 MG/ML
2 INJECTION, SOLUTION INTRAMUSCULAR; INTRAVENOUS
Status: DISCONTINUED | OUTPATIENT
Start: 2021-01-01 | End: 2021-01-01 | Stop reason: HOSPADM

## 2021-01-01 RX ORDER — BISACODYL 5 MG/1
5 TABLET, DELAYED RELEASE ORAL DAILY PRN
Status: DISCONTINUED | OUTPATIENT
Start: 2021-01-01 | End: 2021-01-01 | Stop reason: SDUPTHER

## 2021-01-01 RX ORDER — ONDANSETRON 4 MG/1
4 TABLET, FILM COATED ORAL EVERY 6 HOURS PRN
Status: DISCONTINUED | OUTPATIENT
Start: 2021-01-01 | End: 2021-01-01 | Stop reason: HOSPADM

## 2021-01-01 RX ORDER — MORPHINE SULFATE 2 MG/ML
2 INJECTION, SOLUTION INTRAMUSCULAR; INTRAVENOUS
Status: DISCONTINUED | OUTPATIENT
Start: 2021-01-01 | End: 2021-01-01

## 2021-01-01 RX ORDER — BENZONATATE 100 MG/1
100 CAPSULE ORAL 3 TIMES DAILY PRN
Status: DISCONTINUED | OUTPATIENT
Start: 2021-01-01 | End: 2021-01-01

## 2021-01-01 RX ORDER — METOPROLOL TARTRATE 50 MG/1
50 TABLET, FILM COATED ORAL EVERY 12 HOURS SCHEDULED
Status: DISCONTINUED | OUTPATIENT
Start: 2021-01-01 | End: 2021-01-01

## 2021-01-01 RX ORDER — POTASSIUM CHLORIDE 1.5 G/1.77G
40 POWDER, FOR SOLUTION ORAL AS NEEDED
Status: DISCONTINUED | OUTPATIENT
Start: 2021-01-01 | End: 2021-01-01

## 2021-01-01 RX ORDER — FUROSEMIDE 40 MG/1
40 TABLET ORAL DAILY
Status: DISCONTINUED | OUTPATIENT
Start: 2021-01-01 | End: 2021-01-01

## 2021-01-01 RX ORDER — BENZONATATE 100 MG/1
200 CAPSULE ORAL 3 TIMES DAILY PRN
Status: DISCONTINUED | OUTPATIENT
Start: 2021-01-01 | End: 2021-01-01

## 2021-01-01 RX ORDER — BISACODYL 10 MG
10 SUPPOSITORY, RECTAL RECTAL DAILY PRN
Status: DISCONTINUED | OUTPATIENT
Start: 2021-01-01 | End: 2021-01-01

## 2021-01-01 RX ORDER — LORAZEPAM 2 MG/ML
0.5 INJECTION INTRAMUSCULAR EVERY 4 HOURS PRN
Status: DISCONTINUED | OUTPATIENT
Start: 2021-01-01 | End: 2021-01-01 | Stop reason: HOSPADM

## 2021-01-01 RX ORDER — BUMETANIDE 0.25 MG/ML
1 INJECTION INTRAMUSCULAR; INTRAVENOUS EVERY 6 HOURS PRN
Status: DISCONTINUED | OUTPATIENT
Start: 2021-01-01 | End: 2021-01-01 | Stop reason: HOSPADM

## 2021-01-01 RX ORDER — FUROSEMIDE 10 MG/ML
20 INJECTION INTRAMUSCULAR; INTRAVENOUS ONCE
Status: DISCONTINUED | OUTPATIENT
Start: 2021-01-01 | End: 2021-01-01

## 2021-01-01 RX ORDER — PANTOPRAZOLE SODIUM 40 MG/1
40 TABLET, DELAYED RELEASE ORAL
Status: DISCONTINUED | OUTPATIENT
Start: 2021-01-01 | End: 2021-01-01

## 2021-01-01 RX ORDER — HALOPERIDOL 5 MG/ML
1 INJECTION INTRAMUSCULAR EVERY 4 HOURS PRN
Status: DISCONTINUED | OUTPATIENT
Start: 2021-01-01 | End: 2021-01-01 | Stop reason: HOSPADM

## 2021-01-01 RX ORDER — HYDRALAZINE HYDROCHLORIDE 25 MG/1
25 TABLET, FILM COATED ORAL 3 TIMES DAILY
Status: DISCONTINUED | OUTPATIENT
Start: 2021-01-01 | End: 2021-01-01

## 2021-01-01 RX ORDER — MORPHINE SULFATE 2 MG/ML
2 INJECTION, SOLUTION INTRAMUSCULAR; INTRAVENOUS EVERY 6 HOURS
Status: CANCELLED | OUTPATIENT
Start: 2021-01-01 | End: 2021-12-18

## 2021-01-01 RX ORDER — MORPHINE SULFATE 2 MG/ML
2 INJECTION, SOLUTION INTRAMUSCULAR; INTRAVENOUS EVERY 6 HOURS
Status: DISCONTINUED | OUTPATIENT
Start: 2021-01-01 | End: 2021-01-01 | Stop reason: HOSPADM

## 2021-01-01 RX ORDER — SODIUM CHLORIDE 0.9 % (FLUSH) 0.9 %
10 SYRINGE (ML) INJECTION AS NEEDED
Status: CANCELLED | OUTPATIENT
Start: 2021-01-01

## 2021-01-01 RX ORDER — BISACODYL 10 MG
10 SUPPOSITORY, RECTAL RECTAL DAILY
Status: DISCONTINUED | OUTPATIENT
Start: 2021-01-01 | End: 2021-01-01

## 2021-01-01 RX ORDER — ACETAMINOPHEN 160 MG/5ML
650 SOLUTION ORAL EVERY 4 HOURS PRN
Status: CANCELLED | OUTPATIENT
Start: 2021-01-01

## 2021-01-01 RX ORDER — ASPIRIN 81 MG/1
81 TABLET, CHEWABLE ORAL DAILY
Qty: 90 TABLET | Refills: 0 | Status: SHIPPED | OUTPATIENT
Start: 2021-01-01

## 2021-01-01 RX ORDER — SODIUM CHLORIDE 0.9 % (FLUSH) 0.9 %
10 SYRINGE (ML) INJECTION EVERY 12 HOURS SCHEDULED
Status: CANCELLED | OUTPATIENT
Start: 2021-01-01

## 2021-01-01 RX ORDER — ACETAMINOPHEN 325 MG/1
650 TABLET ORAL EVERY 4 HOURS PRN
Status: DISCONTINUED | OUTPATIENT
Start: 2021-01-01 | End: 2021-01-01 | Stop reason: HOSPADM

## 2021-01-01 RX ORDER — ALBUTEROL SULFATE 90 UG/1
2 AEROSOL, METERED RESPIRATORY (INHALATION)
Status: DISCONTINUED | OUTPATIENT
Start: 2021-01-01 | End: 2021-01-01

## 2021-01-01 RX ORDER — ATORVASTATIN CALCIUM 80 MG/1
80 TABLET, FILM COATED ORAL NIGHTLY
Qty: 90 TABLET | Refills: 1 | Status: SHIPPED | OUTPATIENT
Start: 2021-01-01

## 2021-01-01 RX ORDER — BENAZEPRIL HYDROCHLORIDE 20 MG/1
20 TABLET ORAL 2 TIMES DAILY
COMMUNITY
End: 2021-01-01 | Stop reason: HOSPADM

## 2021-01-01 RX ORDER — ONDANSETRON 2 MG/ML
4 INJECTION INTRAMUSCULAR; INTRAVENOUS EVERY 6 HOURS PRN
Status: CANCELLED | OUTPATIENT
Start: 2021-01-01

## 2021-01-01 RX ORDER — LISINOPRIL 10 MG/1
10 TABLET ORAL
Status: DISCONTINUED | OUTPATIENT
Start: 2021-01-01 | End: 2021-01-01

## 2021-01-01 RX ORDER — ATORVASTATIN CALCIUM 40 MG/1
80 TABLET, FILM COATED ORAL NIGHTLY
Status: DISCONTINUED | OUTPATIENT
Start: 2021-01-01 | End: 2021-01-01

## 2021-01-01 RX ORDER — SODIUM CHLORIDE 0.9 % (FLUSH) 0.9 %
10 SYRINGE (ML) INJECTION EVERY 12 HOURS SCHEDULED
Status: DISCONTINUED | OUTPATIENT
Start: 2021-01-01 | End: 2021-01-01 | Stop reason: HOSPADM

## 2021-01-01 RX ORDER — FUROSEMIDE 10 MG/ML
20 INJECTION INTRAMUSCULAR; INTRAVENOUS DAILY
Status: DISCONTINUED | OUTPATIENT
Start: 2021-01-01 | End: 2021-01-01

## 2021-01-01 RX ORDER — SCOLOPAMINE TRANSDERMAL SYSTEM 1 MG/1
1 PATCH, EXTENDED RELEASE TRANSDERMAL
Status: DISCONTINUED | OUTPATIENT
Start: 2021-01-01 | End: 2021-01-01 | Stop reason: HOSPADM

## 2021-01-01 RX ORDER — POLYETHYLENE GLYCOL 3350 17 G/17G
17 POWDER, FOR SOLUTION ORAL DAILY PRN
Status: DISCONTINUED | OUTPATIENT
Start: 2021-01-01 | End: 2021-01-01

## 2021-01-01 RX ORDER — POTASSIUM CHLORIDE 750 MG/1
40 CAPSULE, EXTENDED RELEASE ORAL AS NEEDED
Status: DISCONTINUED | OUTPATIENT
Start: 2021-01-01 | End: 2021-01-01

## 2021-01-01 RX ORDER — HYDRALAZINE HYDROCHLORIDE 25 MG/1
25 TABLET, FILM COATED ORAL 3 TIMES DAILY
COMMUNITY

## 2021-01-01 RX ORDER — DEXAMETHASONE SODIUM PHOSPHATE 4 MG/ML
6 INJECTION, SOLUTION INTRA-ARTICULAR; INTRALESIONAL; INTRAMUSCULAR; INTRAVENOUS; SOFT TISSUE DAILY
Status: DISCONTINUED | OUTPATIENT
Start: 2021-01-01 | End: 2021-01-01

## 2021-01-01 RX ORDER — MORPHINE SULFATE 2 MG/ML
2 INJECTION, SOLUTION INTRAMUSCULAR; INTRAVENOUS
Status: CANCELLED | OUTPATIENT
Start: 2021-01-01 | End: 2021-12-18

## 2021-01-01 RX ORDER — NYSTATIN 100000 [USP'U]/G
POWDER TOPICAL EVERY 12 HOURS SCHEDULED
Status: DISCONTINUED | OUTPATIENT
Start: 2021-01-01 | End: 2021-01-01

## 2021-01-01 RX ORDER — ERGOCALCIFEROL 1.25 MG/1
50000 CAPSULE ORAL WEEKLY
COMMUNITY

## 2021-01-01 RX ORDER — KETOROLAC TROMETHAMINE 30 MG/ML
15 INJECTION, SOLUTION INTRAMUSCULAR; INTRAVENOUS EVERY 6 HOURS PRN
Status: DISCONTINUED | OUTPATIENT
Start: 2021-01-01 | End: 2021-01-01 | Stop reason: HOSPADM

## 2021-01-01 RX ORDER — ONDANSETRON 4 MG/1
4 TABLET, FILM COATED ORAL EVERY 6 HOURS PRN
Status: CANCELLED | OUTPATIENT
Start: 2021-01-01

## 2021-01-01 RX ORDER — ACETAMINOPHEN 160 MG/5ML
650 SOLUTION ORAL EVERY 4 HOURS PRN
Status: DISCONTINUED | OUTPATIENT
Start: 2021-01-01 | End: 2021-01-01 | Stop reason: HOSPADM

## 2021-01-01 RX ORDER — POTASSIUM CHLORIDE 7.45 MG/ML
10 INJECTION INTRAVENOUS
Status: DISCONTINUED | OUTPATIENT
Start: 2021-01-01 | End: 2021-01-01

## 2021-01-01 RX ORDER — NICOTINE POLACRILEX 4 MG
15 LOZENGE BUCCAL
Status: DISCONTINUED | OUTPATIENT
Start: 2021-01-01 | End: 2021-01-01

## 2021-01-01 RX ORDER — FUROSEMIDE 40 MG/1
40 TABLET ORAL DAILY
COMMUNITY
End: 2021-01-01 | Stop reason: HOSPADM

## 2021-01-01 RX ORDER — ACETAMINOPHEN 650 MG/1
650 SUPPOSITORY RECTAL EVERY 4 HOURS PRN
Status: CANCELLED | OUTPATIENT
Start: 2021-01-01

## 2021-01-01 RX ORDER — CARVEDILOL 25 MG/1
25 TABLET ORAL 2 TIMES DAILY
COMMUNITY
End: 2021-01-01 | Stop reason: HOSPADM

## 2021-01-01 RX ORDER — ALBUTEROL SULFATE 90 UG/1
2 AEROSOL, METERED RESPIRATORY (INHALATION) EVERY 6 HOURS PRN
Status: DISCONTINUED | OUTPATIENT
Start: 2021-01-01 | End: 2021-01-01

## 2021-01-01 RX ADMIN — ACETAMINOPHEN 650 MG: 325 TABLET, FILM COATED ORAL at 12:19

## 2021-01-01 RX ADMIN — PANTOPRAZOLE SODIUM 40 MG: 40 TABLET, DELAYED RELEASE ORAL at 05:45

## 2021-01-01 RX ADMIN — SODIUM CHLORIDE, PRESERVATIVE FREE 10 ML: 5 INJECTION INTRAVENOUS at 09:17

## 2021-01-01 RX ADMIN — ASPIRIN 81 MG CHEWABLE TABLET 81 MG: 81 TABLET CHEWABLE at 08:13

## 2021-01-01 RX ADMIN — MICONAZOLE NITRATE: 2 POWDER TOPICAL at 20:39

## 2021-01-01 RX ADMIN — ALBUTEROL SULFATE 2 PUFF: 90 AEROSOL, METERED RESPIRATORY (INHALATION) at 15:46

## 2021-01-01 RX ADMIN — LISINOPRIL 10 MG: 10 TABLET ORAL at 08:12

## 2021-01-01 RX ADMIN — MORPHINE SULFATE 2 MG: 2 INJECTION, SOLUTION INTRAMUSCULAR; INTRAVENOUS at 06:29

## 2021-01-01 RX ADMIN — APIXABAN 5 MG: 5 TABLET, FILM COATED ORAL at 21:33

## 2021-01-01 RX ADMIN — ASPIRIN 81 MG CHEWABLE TABLET 81 MG: 81 TABLET CHEWABLE at 09:03

## 2021-01-01 RX ADMIN — MORPHINE SULFATE 2 MG: 2 INJECTION, SOLUTION INTRAMUSCULAR; INTRAVENOUS at 11:03

## 2021-01-01 RX ADMIN — NYSTATIN: 100000 POWDER TOPICAL at 20:16

## 2021-01-01 RX ADMIN — SENNOSIDES AND DOCUSATE SODIUM 2 TABLET: 50; 8.6 TABLET ORAL at 20:16

## 2021-01-01 RX ADMIN — APIXABAN 5 MG: 5 TABLET, FILM COATED ORAL at 20:21

## 2021-01-01 RX ADMIN — SENNOSIDES AND DOCUSATE SODIUM 2 TABLET: 50; 8.6 TABLET ORAL at 21:09

## 2021-01-01 RX ADMIN — FUROSEMIDE 20 MG: 10 INJECTION INTRAMUSCULAR; INTRAVENOUS at 10:51

## 2021-01-01 RX ADMIN — ATORVASTATIN CALCIUM 80 MG: 40 TABLET, FILM COATED ORAL at 04:34

## 2021-01-01 RX ADMIN — ALBUTEROL SULFATE 2 PUFF: 90 AEROSOL, METERED RESPIRATORY (INHALATION) at 09:21

## 2021-01-01 RX ADMIN — INSULIN LISPRO 3 UNITS: 100 INJECTION, SOLUTION INTRAVENOUS; SUBCUTANEOUS at 11:48

## 2021-01-01 RX ADMIN — ASPIRIN 81 MG CHEWABLE TABLET 81 MG: 81 TABLET CHEWABLE at 12:30

## 2021-01-01 RX ADMIN — INSULIN LISPRO 2 UNITS: 100 INJECTION, SOLUTION INTRAVENOUS; SUBCUTANEOUS at 16:46

## 2021-01-01 RX ADMIN — ALBUTEROL SULFATE 2 PUFF: 90 AEROSOL, METERED RESPIRATORY (INHALATION) at 16:56

## 2021-01-01 RX ADMIN — NYSTATIN: 100000 POWDER TOPICAL at 20:17

## 2021-01-01 RX ADMIN — MICONAZOLE NITRATE: 2 POWDER TOPICAL at 20:21

## 2021-01-01 RX ADMIN — HYDRALAZINE HYDROCHLORIDE 25 MG: 25 TABLET, FILM COATED ORAL at 16:52

## 2021-01-01 RX ADMIN — ALBUTEROL SULFATE 2 PUFF: 90 AEROSOL, METERED RESPIRATORY (INHALATION) at 15:54

## 2021-01-01 RX ADMIN — APIXABAN 5 MG: 5 TABLET, FILM COATED ORAL at 21:31

## 2021-01-01 RX ADMIN — ASPIRIN 81 MG CHEWABLE TABLET 81 MG: 81 TABLET CHEWABLE at 07:52

## 2021-01-01 RX ADMIN — SODIUM CHLORIDE, PRESERVATIVE FREE 10 ML: 5 INJECTION INTRAVENOUS at 13:32

## 2021-01-01 RX ADMIN — POLYETHYLENE GLYCOL 3350 17 G: 17 POWDER, FOR SOLUTION ORAL at 08:12

## 2021-01-01 RX ADMIN — FUROSEMIDE 40 MG: 40 TABLET ORAL at 12:30

## 2021-01-01 RX ADMIN — POLYETHYLENE GLYCOL 3350 17 G: 17 POWDER, FOR SOLUTION ORAL at 09:18

## 2021-01-01 RX ADMIN — MORPHINE SULFATE 2 MG: 2 INJECTION, SOLUTION INTRAMUSCULAR; INTRAVENOUS at 02:02

## 2021-01-01 RX ADMIN — MINERAL OIL: 1000 LIQUID ORAL at 22:43

## 2021-01-01 RX ADMIN — MICONAZOLE NITRATE: 2 POWDER TOPICAL at 09:29

## 2021-01-01 RX ADMIN — INSULIN LISPRO 2 UNITS: 100 INJECTION, SOLUTION INTRAVENOUS; SUBCUTANEOUS at 07:55

## 2021-01-01 RX ADMIN — METOPROLOL TARTRATE 25 MG: 25 TABLET, FILM COATED ORAL at 09:03

## 2021-01-01 RX ADMIN — MINERAL OIL: 1000 LIQUID ORAL at 02:06

## 2021-01-01 RX ADMIN — MICONAZOLE NITRATE: 2 POWDER TOPICAL at 20:17

## 2021-01-01 RX ADMIN — ALBUTEROL SULFATE 2 PUFF: 90 AEROSOL, METERED RESPIRATORY (INHALATION) at 08:30

## 2021-01-01 RX ADMIN — ATORVASTATIN CALCIUM 80 MG: 40 TABLET, FILM COATED ORAL at 21:33

## 2021-01-01 RX ADMIN — APIXABAN 5 MG: 5 TABLET, FILM COATED ORAL at 20:40

## 2021-01-01 RX ADMIN — BARIUM SULFATE 100 ML: 0.81 POWDER, FOR SUSPENSION ORAL at 15:53

## 2021-01-01 RX ADMIN — APIXABAN 5 MG: 5 TABLET, FILM COATED ORAL at 08:13

## 2021-01-01 RX ADMIN — MICONAZOLE NITRATE: 2 POWDER TOPICAL at 10:51

## 2021-01-01 RX ADMIN — LACTULOSE 20 G: 20 SOLUTION ORAL at 11:40

## 2021-01-01 RX ADMIN — APIXABAN 5 MG: 5 TABLET, FILM COATED ORAL at 20:16

## 2021-01-01 RX ADMIN — NYSTATIN: 100000 POWDER TOPICAL at 21:34

## 2021-01-01 RX ADMIN — SODIUM CHLORIDE, PRESERVATIVE FREE 10 ML: 5 INJECTION INTRAVENOUS at 08:14

## 2021-01-01 RX ADMIN — SODIUM CHLORIDE, PRESERVATIVE FREE 10 ML: 5 INJECTION INTRAVENOUS at 09:31

## 2021-01-01 RX ADMIN — SENNOSIDES AND DOCUSATE SODIUM 2 TABLET: 50; 8.6 TABLET ORAL at 20:39

## 2021-01-01 RX ADMIN — APIXABAN 5 MG: 5 TABLET, FILM COATED ORAL at 12:30

## 2021-01-01 RX ADMIN — SODIUM CHLORIDE, PRESERVATIVE FREE 10 ML: 5 INJECTION INTRAVENOUS at 21:52

## 2021-01-01 RX ADMIN — METOPROLOL TARTRATE 50 MG: 50 TABLET, FILM COATED ORAL at 09:17

## 2021-01-01 RX ADMIN — SODIUM CHLORIDE, PRESERVATIVE FREE 10 ML: 5 INJECTION INTRAVENOUS at 20:21

## 2021-01-01 RX ADMIN — MORPHINE SULFATE 2 MG: 2 INJECTION, SOLUTION INTRAMUSCULAR; INTRAVENOUS at 20:23

## 2021-01-01 RX ADMIN — NYSTATIN: 100000 POWDER TOPICAL at 12:32

## 2021-01-01 RX ADMIN — INSULIN HUMAN 3 UNITS: 100 INJECTION, SOLUTION PARENTERAL at 06:41

## 2021-01-01 RX ADMIN — GLYCOPYRROLATE 0.2 MG: 0.2 INJECTION INTRAMUSCULAR; INTRAVENOUS at 02:02

## 2021-01-01 RX ADMIN — NYSTATIN: 100000 POWDER TOPICAL at 21:10

## 2021-01-01 RX ADMIN — NYSTATIN: 100000 POWDER TOPICAL at 08:13

## 2021-01-01 RX ADMIN — PANTOPRAZOLE SODIUM 40 MG: 40 TABLET, DELAYED RELEASE ORAL at 06:10

## 2021-01-01 RX ADMIN — FUROSEMIDE 40 MG: 40 TABLET ORAL at 08:13

## 2021-01-01 RX ADMIN — MICONAZOLE NITRATE: 2 POWDER TOPICAL at 09:13

## 2021-01-01 RX ADMIN — METOPROLOL TARTRATE 50 MG: 50 TABLET, FILM COATED ORAL at 20:16

## 2021-01-01 RX ADMIN — POLYETHYLENE GLYCOL 3350 17 G: 17 POWDER, FOR SOLUTION ORAL at 09:02

## 2021-01-01 RX ADMIN — ASPIRIN 81 MG CHEWABLE TABLET 81 MG: 81 TABLET CHEWABLE at 09:14

## 2021-01-01 RX ADMIN — Medication 5 MG: at 21:54

## 2021-01-01 RX ADMIN — MICONAZOLE NITRATE: 2 POWDER TOPICAL at 21:10

## 2021-01-01 RX ADMIN — ALBUTEROL SULFATE 2 PUFF: 90 AEROSOL, METERED RESPIRATORY (INHALATION) at 19:55

## 2021-01-01 RX ADMIN — METOPROLOL TARTRATE 25 MG: 25 TABLET, FILM COATED ORAL at 08:13

## 2021-01-01 RX ADMIN — ONDANSETRON HYDROCHLORIDE 4 MG: 4 TABLET, FILM COATED ORAL at 06:00

## 2021-01-01 RX ADMIN — MICONAZOLE NITRATE: 2 POWDER TOPICAL at 09:18

## 2021-01-01 RX ADMIN — ONDANSETRON 4 MG: 2 INJECTION INTRAMUSCULAR; INTRAVENOUS at 17:52

## 2021-01-01 RX ADMIN — ALBUTEROL SULFATE 2 PUFF: 90 AEROSOL, METERED RESPIRATORY (INHALATION) at 12:05

## 2021-01-01 RX ADMIN — SENNOSIDES AND DOCUSATE SODIUM 2 TABLET: 50; 8.6 TABLET ORAL at 09:03

## 2021-01-01 RX ADMIN — ALBUTEROL SULFATE 2 PUFF: 90 AEROSOL, METERED RESPIRATORY (INHALATION) at 16:19

## 2021-01-01 RX ADMIN — APIXABAN 5 MG: 5 TABLET, FILM COATED ORAL at 23:00

## 2021-01-01 RX ADMIN — APIXABAN 5 MG: 5 TABLET, FILM COATED ORAL at 20:15

## 2021-01-01 RX ADMIN — ALBUTEROL SULFATE 2 PUFF: 90 AEROSOL, METERED RESPIRATORY (INHALATION) at 20:21

## 2021-01-01 RX ADMIN — HYDRALAZINE HYDROCHLORIDE 25 MG: 25 TABLET, FILM COATED ORAL at 09:03

## 2021-01-01 RX ADMIN — INSULIN DETEMIR 5 UNITS: 100 INJECTION, SOLUTION SUBCUTANEOUS at 12:27

## 2021-01-01 RX ADMIN — APIXABAN 5 MG: 5 TABLET, FILM COATED ORAL at 21:10

## 2021-01-01 RX ADMIN — MORPHINE SULFATE 4 MG: 4 INJECTION, SOLUTION INTRAMUSCULAR; INTRAVENOUS at 04:01

## 2021-01-01 RX ADMIN — HYDRALAZINE HYDROCHLORIDE 25 MG: 25 TABLET, FILM COATED ORAL at 20:21

## 2021-01-01 RX ADMIN — MICONAZOLE NITRATE: 2 POWDER TOPICAL at 08:14

## 2021-01-01 RX ADMIN — SODIUM CHLORIDE, PRESERVATIVE FREE 10 ML: 5 INJECTION INTRAVENOUS at 09:30

## 2021-01-01 RX ADMIN — INSULIN DETEMIR 5 UNITS: 100 INJECTION, SOLUTION SUBCUTANEOUS at 09:19

## 2021-01-01 RX ADMIN — SODIUM CHLORIDE, PRESERVATIVE FREE 10 ML: 5 INJECTION INTRAVENOUS at 21:10

## 2021-01-01 RX ADMIN — SODIUM CHLORIDE, PRESERVATIVE FREE 10 ML: 5 INJECTION INTRAVENOUS at 20:15

## 2021-01-01 RX ADMIN — METOPROLOL TARTRATE 50 MG: 50 TABLET, FILM COATED ORAL at 07:53

## 2021-01-01 RX ADMIN — INSULIN LISPRO 2 UNITS: 100 INJECTION, SOLUTION INTRAVENOUS; SUBCUTANEOUS at 08:13

## 2021-01-01 RX ADMIN — NYSTATIN: 100000 POWDER TOPICAL at 21:32

## 2021-01-01 RX ADMIN — METOPROLOL TARTRATE 50 MG: 50 TABLET, FILM COATED ORAL at 08:13

## 2021-01-01 RX ADMIN — SENNOSIDES AND DOCUSATE SODIUM 2 TABLET: 50; 8.6 TABLET ORAL at 09:17

## 2021-01-01 RX ADMIN — HYDRALAZINE HYDROCHLORIDE 25 MG: 25 TABLET, FILM COATED ORAL at 20:16

## 2021-01-01 RX ADMIN — ATORVASTATIN CALCIUM 80 MG: 40 TABLET, FILM COATED ORAL at 20:21

## 2021-01-01 RX ADMIN — METOPROLOL TARTRATE 50 MG: 50 TABLET, FILM COATED ORAL at 21:33

## 2021-01-01 RX ADMIN — ACETAMINOPHEN 650 MG: 325 TABLET, FILM COATED ORAL at 09:03

## 2021-01-01 RX ADMIN — REMDESIVIR 200 MG: 100 INJECTION, POWDER, LYOPHILIZED, FOR SOLUTION INTRAVENOUS at 21:28

## 2021-01-01 RX ADMIN — ALBUTEROL SULFATE 2 PUFF: 90 AEROSOL, METERED RESPIRATORY (INHALATION) at 14:12

## 2021-01-01 RX ADMIN — APIXABAN 5 MG: 5 TABLET, FILM COATED ORAL at 09:14

## 2021-01-01 RX ADMIN — NYSTATIN: 100000 POWDER TOPICAL at 09:19

## 2021-01-01 RX ADMIN — SODIUM CHLORIDE, PRESERVATIVE FREE 10 ML: 5 INJECTION INTRAVENOUS at 20:16

## 2021-01-01 RX ADMIN — MICONAZOLE NITRATE: 2 POWDER TOPICAL at 21:55

## 2021-01-01 RX ADMIN — ALBUTEROL SULFATE 2 PUFF: 90 AEROSOL, METERED RESPIRATORY (INHALATION) at 08:09

## 2021-01-01 RX ADMIN — BUMETANIDE 1 MG: 0.25 INJECTION INTRAMUSCULAR; INTRAVENOUS at 04:04

## 2021-01-01 RX ADMIN — INSULIN HUMAN 3 UNITS: 100 INJECTION, SOLUTION PARENTERAL at 18:49

## 2021-01-01 RX ADMIN — SODIUM CHLORIDE, PRESERVATIVE FREE 10 ML: 5 INJECTION INTRAVENOUS at 10:54

## 2021-01-01 RX ADMIN — Medication 5 MG: at 21:33

## 2021-01-01 RX ADMIN — METOPROLOL TARTRATE 25 MG: 25 TABLET, FILM COATED ORAL at 09:29

## 2021-01-01 RX ADMIN — INSULIN DETEMIR 5 UNITS: 100 INJECTION, SOLUTION SUBCUTANEOUS at 07:56

## 2021-01-01 RX ADMIN — HYDRALAZINE HYDROCHLORIDE 25 MG: 25 TABLET, FILM COATED ORAL at 16:47

## 2021-01-01 RX ADMIN — ASPIRIN 81 MG CHEWABLE TABLET 81 MG: 81 TABLET CHEWABLE at 10:51

## 2021-01-01 RX ADMIN — ACETAMINOPHEN 650 MG: 325 TABLET, FILM COATED ORAL at 07:52

## 2021-01-01 RX ADMIN — SODIUM CHLORIDE, PRESERVATIVE FREE 10 ML: 5 INJECTION INTRAVENOUS at 21:34

## 2021-01-01 RX ADMIN — BARIUM SULFATE 20 ML: 400 PASTE ORAL at 15:53

## 2021-01-01 RX ADMIN — GLYCOPYRROLATE 0.2 MG: 0.2 INJECTION INTRAMUSCULAR; INTRAVENOUS at 20:20

## 2021-01-01 RX ADMIN — INSULIN LISPRO 2 UNITS: 100 INJECTION, SOLUTION INTRAVENOUS; SUBCUTANEOUS at 16:52

## 2021-01-01 RX ADMIN — ALBUTEROL SULFATE 2 PUFF: 90 AEROSOL, METERED RESPIRATORY (INHALATION) at 20:11

## 2021-01-01 RX ADMIN — ONDANSETRON 4 MG: 2 INJECTION INTRAMUSCULAR; INTRAVENOUS at 09:14

## 2021-01-01 RX ADMIN — ALBUTEROL SULFATE 2 PUFF: 90 AEROSOL, METERED RESPIRATORY (INHALATION) at 19:58

## 2021-01-01 RX ADMIN — MORPHINE SULFATE 2 MG: 2 INJECTION, SOLUTION INTRAMUSCULAR; INTRAVENOUS at 14:35

## 2021-01-01 RX ADMIN — NYSTATIN: 100000 POWDER TOPICAL at 20:39

## 2021-01-01 RX ADMIN — PANTOPRAZOLE SODIUM 40 MG: 40 TABLET, DELAYED RELEASE ORAL at 06:36

## 2021-01-01 RX ADMIN — ACETAMINOPHEN 650 MG: 325 TABLET, FILM COATED ORAL at 21:10

## 2021-01-01 RX ADMIN — NYSTATIN: 100000 POWDER TOPICAL at 10:53

## 2021-01-01 RX ADMIN — ATORVASTATIN CALCIUM 80 MG: 40 TABLET, FILM COATED ORAL at 23:02

## 2021-01-01 RX ADMIN — MORPHINE SULFATE 4 MG: 4 INJECTION, SOLUTION INTRAMUSCULAR; INTRAVENOUS at 03:04

## 2021-01-01 RX ADMIN — SENNOSIDES AND DOCUSATE SODIUM 2 TABLET: 50; 8.6 TABLET ORAL at 07:55

## 2021-01-01 RX ADMIN — SODIUM CHLORIDE, PRESERVATIVE FREE 10 ML: 5 INJECTION INTRAVENOUS at 09:14

## 2021-01-01 RX ADMIN — ALBUTEROL SULFATE 2 PUFF: 90 AEROSOL, METERED RESPIRATORY (INHALATION) at 07:38

## 2021-01-01 RX ADMIN — ATORVASTATIN CALCIUM 80 MG: 40 TABLET, FILM COATED ORAL at 21:53

## 2021-01-01 RX ADMIN — ALBUTEROL SULFATE 2 PUFF: 90 AEROSOL, METERED RESPIRATORY (INHALATION) at 11:32

## 2021-01-01 RX ADMIN — SODIUM CHLORIDE, PRESERVATIVE FREE 10 ML: 5 INJECTION INTRAVENOUS at 08:13

## 2021-01-01 RX ADMIN — MICONAZOLE NITRATE: 2 POWDER TOPICAL at 08:13

## 2021-01-01 RX ADMIN — LISINOPRIL 10 MG: 10 TABLET ORAL at 12:30

## 2021-01-01 RX ADMIN — MAGNESIUM SULFATE HEPTAHYDRATE 4 G: 40 INJECTION, SOLUTION INTRAVENOUS at 10:52

## 2021-01-01 RX ADMIN — PANTOPRAZOLE SODIUM 40 MG: 40 TABLET, DELAYED RELEASE ORAL at 09:18

## 2021-01-01 RX ADMIN — METOPROLOL TARTRATE 25 MG: 25 TABLET, FILM COATED ORAL at 20:21

## 2021-01-01 RX ADMIN — LORAZEPAM 0.5 MG: 2 INJECTION INTRAMUSCULAR; INTRAVENOUS at 05:30

## 2021-01-01 RX ADMIN — INSULIN LISPRO 2 UNITS: 100 INJECTION, SOLUTION INTRAVENOUS; SUBCUTANEOUS at 11:25

## 2021-01-01 RX ADMIN — ALBUTEROL SULFATE 2 PUFF: 90 AEROSOL, METERED RESPIRATORY (INHALATION) at 13:16

## 2021-01-01 RX ADMIN — APIXABAN 5 MG: 5 TABLET, FILM COATED ORAL at 08:12

## 2021-01-01 RX ADMIN — TAZOBACTAM SODIUM AND PIPERACILLIN SODIUM 3.38 G: 375; 3 INJECTION, SOLUTION INTRAVENOUS at 22:43

## 2021-01-01 RX ADMIN — ASPIRIN 81 MG CHEWABLE TABLET 81 MG: 81 TABLET CHEWABLE at 09:17

## 2021-01-01 RX ADMIN — DEXAMETHASONE SODIUM PHOSPHATE 6 MG: 4 INJECTION, SOLUTION INTRAMUSCULAR; INTRAVENOUS at 16:14

## 2021-01-01 RX ADMIN — APIXABAN 5 MG: 5 TABLET, FILM COATED ORAL at 09:29

## 2021-01-01 RX ADMIN — SODIUM CHLORIDE, PRESERVATIVE FREE 10 ML: 5 INJECTION INTRAVENOUS at 09:04

## 2021-01-01 RX ADMIN — ACETAMINOPHEN 650 MG: 325 TABLET, FILM COATED ORAL at 06:41

## 2021-01-01 RX ADMIN — ALBUTEROL SULFATE 2 PUFF: 90 AEROSOL, METERED RESPIRATORY (INHALATION) at 07:58

## 2021-01-01 RX ADMIN — ALBUTEROL SULFATE 2 PUFF: 90 AEROSOL, METERED RESPIRATORY (INHALATION) at 08:11

## 2021-01-01 RX ADMIN — INSULIN LISPRO 2 UNITS: 100 INJECTION, SOLUTION INTRAVENOUS; SUBCUTANEOUS at 16:59

## 2021-01-01 RX ADMIN — CLOPIDOGREL BISULFATE 75 MG: 75 TABLET ORAL at 10:51

## 2021-01-01 RX ADMIN — MICONAZOLE NITRATE: 2 POWDER TOPICAL at 21:32

## 2021-01-01 RX ADMIN — MICONAZOLE NITRATE: 2 POWDER TOPICAL at 21:34

## 2021-01-01 RX ADMIN — HYDRALAZINE HYDROCHLORIDE 25 MG: 25 TABLET, FILM COATED ORAL at 21:09

## 2021-01-01 RX ADMIN — HYDRALAZINE HYDROCHLORIDE 25 MG: 25 TABLET, FILM COATED ORAL at 20:40

## 2021-01-01 RX ADMIN — ALBUTEROL SULFATE 2 PUFF: 90 AEROSOL, METERED RESPIRATORY (INHALATION) at 15:10

## 2021-01-01 RX ADMIN — ALBUTEROL SULFATE 2 PUFF: 90 AEROSOL, METERED RESPIRATORY (INHALATION) at 08:02

## 2021-01-01 RX ADMIN — SODIUM CHLORIDE, PRESERVATIVE FREE 10 ML: 5 INJECTION INTRAVENOUS at 20:40

## 2021-01-01 RX ADMIN — INSULIN HUMAN 3 UNITS: 100 INJECTION, SOLUTION PARENTERAL at 23:55

## 2021-01-01 RX ADMIN — NYSTATIN: 100000 POWDER TOPICAL at 07:52

## 2021-01-01 RX ADMIN — NYSTATIN: 100000 POWDER TOPICAL at 09:29

## 2021-01-01 RX ADMIN — MORPHINE SULFATE 4 MG: 4 INJECTION, SOLUTION INTRAMUSCULAR; INTRAVENOUS at 06:00

## 2021-01-01 RX ADMIN — INSULIN LISPRO 3 UNITS: 100 INJECTION, SOLUTION INTRAVENOUS; SUBCUTANEOUS at 12:29

## 2021-01-01 RX ADMIN — PANTOPRAZOLE SODIUM 40 MG: 40 TABLET, DELAYED RELEASE ORAL at 06:25

## 2021-01-01 RX ADMIN — HYDRALAZINE HYDROCHLORIDE 25 MG: 25 TABLET, FILM COATED ORAL at 07:55

## 2021-01-01 RX ADMIN — VANCOMYCIN HYDROCHLORIDE 2250 MG: 10 INJECTION, POWDER, LYOPHILIZED, FOR SOLUTION INTRAVENOUS at 16:13

## 2021-01-01 RX ADMIN — FUROSEMIDE 40 MG: 10 INJECTION, SOLUTION INTRAMUSCULAR; INTRAVENOUS at 13:09

## 2021-01-01 RX ADMIN — ONDANSETRON HYDROCHLORIDE 4 MG: 4 TABLET, FILM COATED ORAL at 21:09

## 2021-01-01 RX ADMIN — NYSTATIN: 100000 POWDER TOPICAL at 09:13

## 2021-01-01 RX ADMIN — ATORVASTATIN CALCIUM 80 MG: 40 TABLET, FILM COATED ORAL at 21:31

## 2021-01-01 RX ADMIN — POLYETHYLENE GLYCOL 3350 17 G: 17 POWDER, FOR SOLUTION ORAL at 11:25

## 2021-01-01 RX ADMIN — MORPHINE SULFATE 4 MG: 4 INJECTION, SOLUTION INTRAMUSCULAR; INTRAVENOUS at 00:54

## 2021-01-01 RX ADMIN — HYDRALAZINE HYDROCHLORIDE 25 MG: 25 TABLET, FILM COATED ORAL at 15:31

## 2021-01-01 RX ADMIN — ALBUTEROL SULFATE 2 PUFF: 90 AEROSOL, METERED RESPIRATORY (INHALATION) at 20:04

## 2021-01-01 RX ADMIN — ALBUTEROL SULFATE 2 PUFF: 90 AEROSOL, METERED RESPIRATORY (INHALATION) at 19:50

## 2021-01-01 RX ADMIN — FUROSEMIDE 40 MG: 40 TABLET ORAL at 07:56

## 2021-01-01 RX ADMIN — METOPROLOL TARTRATE 50 MG: 50 TABLET, FILM COATED ORAL at 12:30

## 2021-01-01 RX ADMIN — MORPHINE SULFATE 2 MG: 2 INJECTION, SOLUTION INTRAMUSCULAR; INTRAVENOUS at 05:01

## 2021-01-01 RX ADMIN — ALBUTEROL SULFATE 2 PUFF: 90 AEROSOL, METERED RESPIRATORY (INHALATION) at 20:32

## 2021-01-01 RX ADMIN — ALBUTEROL SULFATE 2 PUFF: 90 AEROSOL, METERED RESPIRATORY (INHALATION) at 13:03

## 2021-01-01 RX ADMIN — HYDRALAZINE HYDROCHLORIDE 25 MG: 25 TABLET, FILM COATED ORAL at 17:15

## 2021-01-01 RX ADMIN — MORPHINE SULFATE 4 MG: 4 INJECTION, SOLUTION INTRAMUSCULAR; INTRAVENOUS at 23:30

## 2021-01-01 RX ADMIN — INSULIN HUMAN 2 UNITS: 100 INJECTION, SOLUTION PARENTERAL at 14:00

## 2021-01-01 RX ADMIN — ALBUTEROL SULFATE 2 PUFF: 90 AEROSOL, METERED RESPIRATORY (INHALATION) at 15:50

## 2021-01-01 RX ADMIN — ASPIRIN 81 MG CHEWABLE TABLET 81 MG: 81 TABLET CHEWABLE at 09:29

## 2021-01-01 RX ADMIN — INSULIN LISPRO 3 UNITS: 100 INJECTION, SOLUTION INTRAVENOUS; SUBCUTANEOUS at 17:15

## 2021-01-01 RX ADMIN — IOPAMIDOL 115 ML: 755 INJECTION, SOLUTION INTRAVENOUS at 03:50

## 2021-01-01 RX ADMIN — SENNOSIDES AND DOCUSATE SODIUM 2 TABLET: 50; 8.6 TABLET ORAL at 21:30

## 2021-01-01 RX ADMIN — SENNOSIDES AND DOCUSATE SODIUM 2 TABLET: 50; 8.6 TABLET ORAL at 08:12

## 2021-01-01 RX ADMIN — ATORVASTATIN CALCIUM 80 MG: 40 TABLET, FILM COATED ORAL at 20:16

## 2021-01-01 RX ADMIN — MICONAZOLE NITRATE: 2 POWDER TOPICAL at 20:16

## 2021-01-01 RX ADMIN — SENNOSIDES AND DOCUSATE SODIUM 2 TABLET: 50; 8.6 TABLET ORAL at 21:33

## 2021-01-01 RX ADMIN — Medication 5 MG: at 21:52

## 2021-01-01 RX ADMIN — INSULIN LISPRO 2 UNITS: 100 INJECTION, SOLUTION INTRAVENOUS; SUBCUTANEOUS at 09:29

## 2021-01-01 RX ADMIN — MINERAL OIL: 1000 LIQUID ORAL at 17:57

## 2021-01-01 RX ADMIN — INSULIN LISPRO 3 UNITS: 100 INJECTION, SOLUTION INTRAVENOUS; SUBCUTANEOUS at 17:35

## 2021-01-01 RX ADMIN — ALBUTEROL SULFATE 2 PUFF: 90 AEROSOL, METERED RESPIRATORY (INHALATION) at 12:14

## 2021-01-01 RX ADMIN — ATORVASTATIN CALCIUM 80 MG: 40 TABLET, FILM COATED ORAL at 21:10

## 2021-01-01 RX ADMIN — MICONAZOLE NITRATE: 2 POWDER TOPICAL at 09:03

## 2021-01-01 RX ADMIN — ACETAMINOPHEN ORAL SOLUTION 649.6 MG: 650 SOLUTION ORAL at 05:43

## 2021-01-01 RX ADMIN — SODIUM CHLORIDE, PRESERVATIVE FREE 10 ML: 5 INJECTION INTRAVENOUS at 23:02

## 2021-01-01 RX ADMIN — NYSTATIN: 100000 POWDER TOPICAL at 08:14

## 2021-01-01 RX ADMIN — APIXABAN 5 MG: 5 TABLET, FILM COATED ORAL at 07:55

## 2021-01-01 RX ADMIN — TAZOBACTAM SODIUM AND PIPERACILLIN SODIUM 3.38 G: 375; 3 INJECTION, SOLUTION INTRAVENOUS at 05:36

## 2021-01-01 RX ADMIN — FUROSEMIDE 40 MG: 40 TABLET ORAL at 12:49

## 2021-01-01 RX ADMIN — BISACODYL 10 MG: 10 SUPPOSITORY RECTAL at 12:29

## 2021-01-01 RX ADMIN — NYSTATIN: 100000 POWDER TOPICAL at 20:21

## 2021-01-01 RX ADMIN — INSULIN LISPRO 2 UNITS: 100 INJECTION, SOLUTION INTRAVENOUS; SUBCUTANEOUS at 12:35

## 2021-01-01 RX ADMIN — INSULIN HUMAN 2 UNITS: 100 INJECTION, SOLUTION PARENTERAL at 06:36

## 2021-01-01 RX ADMIN — INSULIN LISPRO 2 UNITS: 100 INJECTION, SOLUTION INTRAVENOUS; SUBCUTANEOUS at 09:09

## 2021-01-01 RX ADMIN — ATORVASTATIN CALCIUM 80 MG: 40 TABLET, FILM COATED ORAL at 20:14

## 2021-01-01 RX ADMIN — INSULIN DETEMIR 5 UNITS: 100 INJECTION, SOLUTION SUBCUTANEOUS at 08:18

## 2021-01-01 RX ADMIN — MICONAZOLE NITRATE: 2 POWDER TOPICAL at 07:51

## 2021-01-01 RX ADMIN — APIXABAN 5 MG: 5 TABLET, FILM COATED ORAL at 09:03

## 2021-01-01 RX ADMIN — PANTOPRAZOLE SODIUM 40 MG: 40 TABLET, DELAYED RELEASE ORAL at 06:00

## 2021-01-01 RX ADMIN — METOPROLOL TARTRATE 25 MG: 25 TABLET, FILM COATED ORAL at 21:09

## 2021-01-01 RX ADMIN — NYSTATIN: 100000 POWDER TOPICAL at 21:54

## 2021-01-01 RX ADMIN — ALBUTEROL SULFATE 2 PUFF: 90 AEROSOL, METERED RESPIRATORY (INHALATION) at 12:22

## 2021-01-01 RX ADMIN — MICONAZOLE NITRATE: 2 POWDER TOPICAL at 12:32

## 2021-01-01 RX ADMIN — HYDRALAZINE HYDROCHLORIDE 25 MG: 25 TABLET, FILM COATED ORAL at 17:43

## 2021-01-01 RX ADMIN — APIXABAN 5 MG: 5 TABLET, FILM COATED ORAL at 09:17

## 2021-01-01 RX ADMIN — APIXABAN 5 MG: 5 TABLET, FILM COATED ORAL at 21:53

## 2021-01-01 RX ADMIN — INSULIN LISPRO 2 UNITS: 100 INJECTION, SOLUTION INTRAVENOUS; SUBCUTANEOUS at 13:39

## 2021-01-01 RX ADMIN — HYDRALAZINE HYDROCHLORIDE 25 MG: 25 TABLET, FILM COATED ORAL at 09:17

## 2021-01-01 RX ADMIN — ACETAMINOPHEN 650 MG: 325 TABLET, FILM COATED ORAL at 12:59

## 2021-01-01 RX ADMIN — ATORVASTATIN CALCIUM 80 MG: 40 TABLET, FILM COATED ORAL at 20:40

## 2021-01-01 RX ADMIN — METOPROLOL TARTRATE 25 MG: 25 TABLET, FILM COATED ORAL at 20:15

## 2021-01-01 RX ADMIN — HYDRALAZINE HYDROCHLORIDE 25 MG: 25 TABLET, FILM COATED ORAL at 08:12

## 2021-01-01 RX ADMIN — INSULIN LISPRO 2 UNITS: 100 INJECTION, SOLUTION INTRAVENOUS; SUBCUTANEOUS at 11:40

## 2021-01-01 RX ADMIN — ALBUTEROL SULFATE 2 PUFF: 90 AEROSOL, METERED RESPIRATORY (INHALATION) at 20:29

## 2021-01-01 RX ADMIN — PANTOPRAZOLE SODIUM 40 MG: 40 TABLET, DELAYED RELEASE ORAL at 06:46

## 2021-01-01 RX ADMIN — MICONAZOLE NITRATE: 2 POWDER TOPICAL at 22:49

## 2021-01-01 RX ADMIN — METOPROLOL TARTRATE 50 MG: 50 TABLET, FILM COATED ORAL at 20:39

## 2021-01-01 RX ADMIN — SODIUM CHLORIDE, PRESERVATIVE FREE 10 ML: 5 INJECTION INTRAVENOUS at 21:54

## 2021-01-01 RX ADMIN — PANTOPRAZOLE SODIUM 40 MG: 40 TABLET, DELAYED RELEASE ORAL at 05:25

## 2021-01-01 RX ADMIN — INSULIN LISPRO 2 UNITS: 100 INJECTION, SOLUTION INTRAVENOUS; SUBCUTANEOUS at 09:18

## 2021-01-01 RX ADMIN — NYSTATIN: 100000 POWDER TOPICAL at 09:03

## 2021-01-01 RX ADMIN — ALBUTEROL SULFATE 2 PUFF: 90 AEROSOL, METERED RESPIRATORY (INHALATION) at 12:58

## 2021-01-01 RX ADMIN — TAZOBACTAM SODIUM AND PIPERACILLIN SODIUM 3.38 G: 375; 3 INJECTION, SOLUTION INTRAVENOUS at 16:13

## 2021-01-01 RX ADMIN — METOPROLOL TARTRATE 25 MG: 25 TABLET, FILM COATED ORAL at 03:13

## 2021-01-01 RX ADMIN — INSULIN LISPRO 3 UNITS: 100 INJECTION, SOLUTION INTRAVENOUS; SUBCUTANEOUS at 13:47

## 2021-10-11 ENCOUNTER — HOSPITAL ENCOUNTER (EMERGENCY)
Age: 71
Discharge: HOME | End: 2021-10-12
Attending: EMERGENCY MEDICINE
Payer: MEDICARE

## 2021-10-11 ENCOUNTER — APPOINTMENT (OUTPATIENT)
Dept: GENERAL RADIOLOGY | Age: 71
End: 2021-10-11
Payer: MEDICARE

## 2021-10-11 DIAGNOSIS — E87.5 HYPERKALEMIA: ICD-10-CM

## 2021-10-11 DIAGNOSIS — R77.8 TROPONIN LEVEL ELEVATED: ICD-10-CM

## 2021-10-11 DIAGNOSIS — I50.9 ACUTE CONGESTIVE HEART FAILURE, UNSPECIFIED HEART FAILURE TYPE (HCC): Primary | ICD-10-CM

## 2021-10-11 LAB
A/G RATIO: 0.8 (ref 1.1–2.2)
ALBUMIN SERPL-MCNC: 3.1 G/DL (ref 3.4–5)
ALP BLD-CCNC: 151 U/L (ref 40–129)
ALT SERPL-CCNC: 18 U/L (ref 10–40)
ANION GAP SERPL CALCULATED.3IONS-SCNC: 10 MMOL/L (ref 3–16)
AST SERPL-CCNC: 23 U/L (ref 15–37)
BASOPHILS ABSOLUTE: 0.1 K/UL (ref 0–0.2)
BASOPHILS RELATIVE PERCENT: 0.6 %
BILIRUB SERPL-MCNC: 1 MG/DL (ref 0–1)
BUN BLDV-MCNC: 36 MG/DL (ref 7–20)
CALCIUM SERPL-MCNC: 9.1 MG/DL (ref 8.3–10.6)
CHLORIDE BLD-SCNC: 101 MMOL/L (ref 99–110)
CO2: 28 MMOL/L (ref 21–32)
CREAT SERPL-MCNC: 1.3 MG/DL (ref 0.6–1.2)
EOSINOPHILS ABSOLUTE: 0.4 K/UL (ref 0–0.6)
EOSINOPHILS RELATIVE PERCENT: 3.1 %
GFR AFRICAN AMERICAN: 49
GFR NON-AFRICAN AMERICAN: 40
GLOBULIN: 3.9 G/DL
GLUCOSE BLD-MCNC: 276 MG/DL (ref 70–99)
HCT VFR BLD CALC: 39.5 % (ref 36–48)
HEMOGLOBIN: 13.1 G/DL (ref 12–16)
LYMPHOCYTES ABSOLUTE: 1.3 K/UL (ref 1–5.1)
LYMPHOCYTES RELATIVE PERCENT: 11.2 %
MCH RBC QN AUTO: 27.9 PG (ref 26–34)
MCHC RBC AUTO-ENTMCNC: 33.1 G/DL (ref 31–36)
MCV RBC AUTO: 84.2 FL (ref 80–100)
MONOCYTES ABSOLUTE: 0.7 K/UL (ref 0–1.3)
MONOCYTES RELATIVE PERCENT: 6.2 %
NEUTROPHILS ABSOLUTE: 9.3 K/UL (ref 1.7–7.7)
NEUTROPHILS RELATIVE PERCENT: 78.9 %
PDW BLD-RTO: 16 % (ref 12.4–15.4)
PLATELET # BLD: 296 K/UL (ref 135–450)
PMV BLD AUTO: 7.4 FL (ref 5–10.5)
POTASSIUM REFLEX MAGNESIUM: 5.8 MMOL/L (ref 3.5–5.1)
PRO-BNP: 5726 PG/ML (ref 0–124)
RBC # BLD: 4.69 M/UL (ref 4–5.2)
SODIUM BLD-SCNC: 139 MMOL/L (ref 136–145)
TOTAL PROTEIN: 7 G/DL (ref 6.4–8.2)
TROPONIN: 0.02 NG/ML
WBC # BLD: 11.8 K/UL (ref 4–11)

## 2021-10-11 PROCEDURE — 99285 EMERGENCY DEPT VISIT HI MDM: CPT

## 2021-10-11 PROCEDURE — 71045 X-RAY EXAM CHEST 1 VIEW: CPT

## 2021-10-11 PROCEDURE — 93005 ELECTROCARDIOGRAM TRACING: CPT | Performed by: EMERGENCY MEDICINE

## 2021-10-11 PROCEDURE — 36415 COLL VENOUS BLD VENIPUNCTURE: CPT

## 2021-10-11 PROCEDURE — 96374 THER/PROPH/DIAG INJ IV PUSH: CPT

## 2021-10-11 PROCEDURE — 85025 COMPLETE CBC W/AUTO DIFF WBC: CPT

## 2021-10-11 PROCEDURE — 83880 ASSAY OF NATRIURETIC PEPTIDE: CPT

## 2021-10-11 PROCEDURE — 84484 ASSAY OF TROPONIN QUANT: CPT

## 2021-10-11 PROCEDURE — 80053 COMPREHEN METABOLIC PANEL: CPT

## 2021-10-11 RX ORDER — FUROSEMIDE 10 MG/ML
20 INJECTION INTRAMUSCULAR; INTRAVENOUS ONCE
Status: COMPLETED | OUTPATIENT
Start: 2021-10-12 | End: 2021-10-12

## 2021-10-11 ASSESSMENT — ENCOUNTER SYMPTOMS
NAUSEA: 0
ABDOMINAL PAIN: 0
SORE THROAT: 0
EYE DISCHARGE: 0
BACK PAIN: 0
DIARRHEA: 0
COUGH: 0
VOMITING: 0

## 2021-10-12 VITALS
OXYGEN SATURATION: 96 % | SYSTOLIC BLOOD PRESSURE: 148 MMHG | DIASTOLIC BLOOD PRESSURE: 105 MMHG | RESPIRATION RATE: 24 BRPM | TEMPERATURE: 98.2 F | HEIGHT: 67 IN | HEART RATE: 106 BPM | WEIGHT: 269 LBS | BODY MASS INDEX: 42.22 KG/M2

## 2021-10-12 PROBLEM — I48.0 PAROXYSMAL A-FIB (HCC): Status: ACTIVE | Noted: 2021-10-12

## 2021-10-12 LAB
EKG ATRIAL RATE: 105 BPM
EKG DIAGNOSIS: NORMAL
EKG P AXIS: 169 DEGREES
EKG P-R INTERVAL: 120 MS
EKG Q-T INTERVAL: 372 MS
EKG QRS DURATION: 120 MS
EKG QTC CALCULATION (BAZETT): 491 MS
EKG R AXIS: -46 DEGREES
EKG T AXIS: 46 DEGREES
EKG VENTRICULAR RATE: 105 BPM
TROPONIN: 0.02 NG/ML

## 2021-10-12 PROCEDURE — 36415 COLL VENOUS BLD VENIPUNCTURE: CPT

## 2021-10-12 PROCEDURE — 84484 ASSAY OF TROPONIN QUANT: CPT

## 2021-10-12 PROCEDURE — 6370000000 HC RX 637 (ALT 250 FOR IP): Performed by: EMERGENCY MEDICINE

## 2021-10-12 PROCEDURE — 93010 ELECTROCARDIOGRAM REPORT: CPT | Performed by: INTERNAL MEDICINE

## 2021-10-12 PROCEDURE — 6360000002 HC RX W HCPCS: Performed by: EMERGENCY MEDICINE

## 2021-10-12 RX ORDER — HYDROGEN PEROXIDE 2.65 ML/100ML
1 LIQUID ORAL; TOPICAL DAILY
COMMUNITY
Start: 2021-07-27

## 2021-10-12 RX ORDER — CLOPIDOGREL BISULFATE 75 MG/1
1 TABLET ORAL DAILY
COMMUNITY
Start: 2021-10-03

## 2021-10-12 RX ORDER — SILVER SULFADIAZINE 1 %
1 CREAM (GRAM) TOPICAL 3 TIMES DAILY
COMMUNITY
Start: 2021-07-18

## 2021-10-12 RX ORDER — DULOXETIN HYDROCHLORIDE 30 MG/1
1 CAPSULE, DELAYED RELEASE ORAL DAILY
COMMUNITY
Start: 2021-10-03

## 2021-10-12 RX ORDER — PANTOPRAZOLE SODIUM 40 MG/1
1 TABLET, DELAYED RELEASE ORAL DAILY
COMMUNITY
Start: 2021-10-03

## 2021-10-12 RX ORDER — ATORVASTATIN CALCIUM 40 MG/1
1 TABLET, FILM COATED ORAL DAILY
COMMUNITY
Start: 2021-07-27

## 2021-10-12 RX ORDER — METOPROLOL TARTRATE 50 MG/1
50 TABLET, FILM COATED ORAL ONCE
Status: COMPLETED | OUTPATIENT
Start: 2021-10-12 | End: 2021-10-12

## 2021-10-12 RX ORDER — INSULIN GLARGINE 100 [IU]/ML
60 INJECTION, SOLUTION SUBCUTANEOUS EVERY EVENING
COMMUNITY
Start: 2021-10-05

## 2021-10-12 RX ORDER — METOPROLOL SUCCINATE 25 MG/1
1 TABLET, EXTENDED RELEASE ORAL DAILY
COMMUNITY
Start: 2021-10-03 | End: 2021-10-12 | Stop reason: SDUPTHER

## 2021-10-12 RX ORDER — FUROSEMIDE 40 MG/1
40 TABLET ORAL DAILY
Qty: 3 TABLET | Refills: 0 | Status: SHIPPED | OUTPATIENT
Start: 2021-10-12 | End: 2021-10-12 | Stop reason: SDUPTHER

## 2021-10-12 RX ORDER — NYSTATIN 100000 [USP'U]/G
POWDER TOPICAL 2 TIMES DAILY
COMMUNITY

## 2021-10-12 RX ORDER — BENAZEPRIL HYDROCHLORIDE 20 MG/1
1 TABLET ORAL 2 TIMES DAILY
COMMUNITY
Start: 2021-10-05

## 2021-10-12 RX ORDER — FUROSEMIDE 20 MG/1
1 TABLET ORAL DAILY
COMMUNITY
Start: 2021-09-16

## 2021-10-12 RX ORDER — METOPROLOL SUCCINATE 25 MG/1
25 TABLET, EXTENDED RELEASE ORAL DAILY
Qty: 30 TABLET | Refills: 0 | Status: SHIPPED | OUTPATIENT
Start: 2021-10-12

## 2021-10-12 RX ORDER — FUROSEMIDE 40 MG/1
40 TABLET ORAL DAILY
Qty: 3 TABLET | Refills: 0 | Status: SHIPPED | OUTPATIENT
Start: 2021-10-12 | End: 2021-10-15

## 2021-10-12 RX ORDER — ERGOCALCIFEROL 1.25 MG/1
1 CAPSULE ORAL WEEKLY
COMMUNITY
Start: 2021-09-16

## 2021-10-12 RX ORDER — FUROSEMIDE 40 MG/1
20 TABLET ORAL ONCE
Status: COMPLETED | OUTPATIENT
Start: 2021-10-12 | End: 2021-10-12

## 2021-10-12 RX ADMIN — METOPROLOL TARTRATE 50 MG: 50 TABLET, FILM COATED ORAL at 16:46

## 2021-10-12 RX ADMIN — FUROSEMIDE 20 MG: 10 INJECTION, SOLUTION INTRAMUSCULAR; INTRAVENOUS at 01:55

## 2021-10-12 RX ADMIN — FUROSEMIDE 20 MG: 40 TABLET ORAL at 16:45

## 2021-10-12 NOTE — ED PROVIDER NOTES
dysuria and urgency. Musculoskeletal: Negative for back pain and myalgias. Skin: Negative for rash. Neurological: Negative for weakness and headaches. Hematological: Does not bruise/bleed easily. Psychiatric/Behavioral: Negative for confusion. Positives and Pertinent negatives as per HPI. Except as noted above in the ROS, all other systems were reviewed and negative. PAST MEDICAL HISTORY     Past Medical History:   Diagnosis Date    [facility]congest[facility]     [facility]     GERD (gastroesophageal reflux disease)     Hyperlipidemia     Hypertension          SURGICAL HISTORY     Past Surgical History:   Procedure Laterality Date    CARDIAC SURGERY      HYSTERECTOMY      TOE AMPUTATION           CURRENTMEDICATIONS       Previous Medications    TRIAMCINOLONE (ARISTOCORT) 0.5 % CREAM    APPLY TO BOTH LOWER LEGS EVERY MORNING         ALLERGIES     Patient has no known allergies. FAMILYHISTORY     History reviewed. No pertinent family history. SOCIAL HISTORY       Social History     Socioeconomic History    Marital status:      Spouse name: None    Number of children: None    Years of education: None    Highest education level: None   Occupational History    None   Tobacco Use    Smoking status: Never Smoker    Smokeless tobacco: Never Used   Substance and Sexual Activity    Alcohol use: Never    Drug use: None    Sexual activity: None   Other Topics Concern    None   Social History Narrative    None     Social Determinants of Health     Financial Resource Strain:     Difficulty of Paying Living Expenses:    Food Insecurity:     Worried About Running Out of Food in the Last Year:     Ran Out of Food in the Last Year:    Transportation Needs:     Lack of Transportation (Medical):      Lack of Transportation (Non-Medical):    Physical Activity:     Days of Exercise per Week:     Minutes of Exercise per Session:    Stress:     Feeling of Stress :    Social Connections:     Frequency of Communication with Friends and Family:     Frequency of Social Gatherings with Friends and Family:     Attends [employer] Services:     Active Member of Clubs or Organizations:     Attends Club or Organization Meetings:     Marital Status:    Intimate Partner Violence:     Fear of Current or Ex-Partner:     Emotionally Abused:     Physically Abused:     Sexually Abused:        SCREENINGS    [place] Coma Scale  Eye Opening: Spontaneous  Best Verbal Response: Oriented  Best Motor Response: Obeys commands  [place] Coma Scale Score: 15        PHYSICAL EXAM    (up to 7 for level 4, 8 or more for level 5)     ED Triage Vitals [10/11/21 2029]   BP Temp Temp Source Pulse Resp SpO2 Height Weight   (!) 147/107 98.2 °F (36.8 °C) Oral 108 25 98 % 5' 7\" (1.702 m) 269 lb (122 kg)       Physical Exam  Constitutional:       General: She is not in acute distress. Appearance: She is well-developed. She is morbidly obese. HENT:      Head: Normocephalic and atraumatic. Right Ear: External ear normal.      Left Ear: External ear normal.      Nose: Nose normal.      Mouth/Throat:      Pharynx: No oropharyngeal exudate. Eyes:      Conjunctiva/sclera: Conjunctivae normal.      Pupils: Pupils are equal, round, and reactive to light. Cardiovascular:      Rate and Rhythm: Normal rate and regular rhythm. Heart sounds: Normal heart sounds. No murmur heard. No friction rub. No gallop. Pulmonary:      Effort: Pulmonary effort is normal. No respiratory distress. Breath sounds: Normal breath sounds. No wheezing. Abdominal:      General: Bowel sounds are normal. There is no distension. Palpations: Abdomen is soft. Tenderness: There is no abdominal tenderness. There is no guarding or rebound. Musculoskeletal:         General: No tenderness. Normal range of motion. Cervical back: Normal range of motion and neck supple. Lymphadenopathy:      Cervical: No cervical adenopathy. Skin:     General: Skin is warm and dry. Findings: No rash. Neurological:      Mental Status: She is alert and oriented to person, place, and time. Cranial Nerves: No cranial nerve deficit. Psychiatric:         Behavior: Behavior is cooperative.          DIAGNOSTIC RESULTS   LABS:    Results for orders placed or performed during the hospital encounter of 10/11/21   CBC Auto Differential   Result Value Ref Range    WBC 11.8 (H) 4.0 - 11.0 K/uL    RBC 4.69 4.00 - 5.20 M/uL    Hemoglobin 13.1 12.0 - 16.0 g/dL    Hematocrit 39.5 36.0 - 48.0 %    MCV 84.2 80.0 - 100.0 fL    MCH 27.9 26.0 - 34.0 pg    MCHC 33.1 31.0 - 36.0 g/dL    RDW 16.0 (H) 12.4 - 15.4 %    Platelets 869 752 - 111 K/uL    MPV 7.4 5.0 - 10.5 fL    Neutrophils % 78.9 %    Lymphocytes % 11.2 %    Monocytes % 6.2 %    Eosinophils % 3.1 %    Basophils % 0.6 %    Neutrophils Absolute 9.3 (H) 1.7 - 7.7 K/uL    Lymphocytes Absolute 1.3 1.0 - 5.1 K/uL    Monocytes Absolute 0.7 0.0 - 1.3 K/uL    Eosinophils Absolute 0.4 0.0 - 0.6 K/uL    Basophils Absolute 0.1 0.0 - 0.2 K/uL   Comprehensive Metabolic Panel w/ Reflex to MG   Result Value Ref Range    Sodium 139 136 - 145 mmol/L    Potassium reflex Magnesium 5.8 (H) 3.5 - 5.1 mmol/L    Chloride 101 99 - 110 mmol/L    CO2 28 21 - 32 mmol/L    Anion Gap 10 3 - 16    Glucose 276 (H) 70 - 99 mg/dL    BUN 36 (H) 7 - 20 mg/dL    CREATININE 1.3 (H) 0.6 - 1.2 mg/dL    GFR Non- 40 (A) >60    GFR  49 (A) >60    Calcium 9.1 8.3 - 10.6 mg/dL    Total Protein 7.0 6.4 - 8.2 g/dL    Albumin 3.1 (L) 3.4 - 5.0 g/dL    Albumin/Globulin Ratio 0.8 (L) 1.1 - 2.2    Total Bilirubin 1.0 0.0 - 1.0 mg/dL    Alkaline Phosphatase 151 (H) 40 - 129 U/L    ALT 18 10 - 40 U/L    AST 23 15 - 37 U/L    Globulin 3.9 Not Established g/dL   Troponin   Result Value Ref Range    Troponin 0.02 (H) <0.01 ng/mL   Brain Natriuretic Peptide   Result Value Ref Range    Pro-BNP 5,726 (H) 0 - 124 pg/mL   Troponin   Result Value Ref Range    Troponin 0.02 (H) <0.01 ng/mL   EKG 12 Lead   Result Value Ref Range    Ventricular Rate 105 BPM    Atrial Rate 105 BPM    P-R Interval 120 ms    QRS Duration 120 ms    Q-T Interval 372 ms    QTc Calculation (Bazett) 491 ms    P Axis 169 degrees    R Axis -46 degrees    T Axis 46 degrees    Diagnosis       Unusual P axis, possible ectopic atrial tachycardiaRight bundle branch blockLeft anterior fascicular block Bifascicular block Possible Lateral infarct , age undeterminedAbnormal ECGNo previous ECGs available       All other labs were within normal range ornot returned as of this dictation. EKG: All EKG's are interpreted by the Emergency Department Physician who either signs or Co-signs this chart in the absence of a cardiologist.  Please see their note for interpretation of EKG. EKG Interpretation    Interpreted by emergency department physician    Rhythm: sinus arrhythmia  Rate: tachycardia  Axis: normal  Ectopy: none  Conduction: right bundle branch block (complete) and left anterior fasciclar block  ST Segments: normal  T Waves: normal  Q Waves: lateral    Clinical Impression: sinus tachycardia with sinus arrhythmia, bifascicular block, prior lateral infarct      RADIOLOGY:   Non-plain film images such as CT, Ultrasound and MRI are read by the radiologist.  Plain radiographic images are visualized and preliminarily interpreted by the ED Provider with the belowfindings:    Interpretation per the Radiologist below, if available at the time of this note:    XR CHEST PORTABLE   Final Result   1. Left pleural effusion with left basilar atelectasis or pneumonia. 2. Pulmonary edema versus artifact.                PROCEDURES   Unless otherwise noted below, none     Procedures    CRITICAL CARE TIME   N/A    CONSULTS:  IP CONSULT TO HOSPITALIST      EMERGENCY DEPARTMENT COURSE and DIFFERENTIAL DIAGNOSIS/MDM:   Vitals: DISPOSITION  Transferred to [facility].       (Please note that portions of this note were completed with a voice recognition program.  Efforts were made to edit the dictations but occasionally words are mis-transcribed.)    Shahid Carmona MD(electronically signed)      Shahid Carmona MD  10/12/21 4420

## 2021-10-12 NOTE — ED NOTES
Release of information faxed to [facility] for report from ER visit last week and echo report from doctor visit.      Tiffanie Quiroga, EMT-P  10/11/21 7070

## 2021-10-12 NOTE — ED PROVIDER NOTES
The attending note:    Apparently I was updated on this patient who had been here during the night. The patient is doing fine she says that she is breathing fine she had received Lasix for some mild CHF  She only takes a small dose 20 mg and was only given 20 mg this morning  She has no significant abnormal acute ischemic or injury pattern on her EKG  She is slightly tachycardic but was on Toprol but apparently she tells me she does not take it anymore apparently was recently stopped for some reason she is a diabetic she is eating she is up walking to the bathroom. Due to the pandemic we do not have any beds available so at this point I did sit down and talk to her I think we can modify her medications and have her reseen maybe as an outpatient by her cardiologist in [facility] in the next 48 to 72 hours at this point she will be advised to take Lasix 40 mg a day  As well as take Toprol XL which she apparently has not been taking.     Assessment mild congestive heart failure    Plan reinstitute Toprol, increase Lasix for 3 days  Return if any worsening or go to a major hospital    Signed MD Sukhdeep Chen MD  10/12/21 0433

## 2021-10-12 NOTE — ED NOTES
Fax received from [facility] Given to Dr. Dai Quiroga, EMT-P  10/11/21 8484

## 2021-10-12 NOTE — ED NOTES
AVS provided and reviewed with the patient and daughter. The patient and daughter verbalized understanding of care at home, follow up care, and emergent symptoms to return for. No questions or concerns verbalized at this time. The patient is alert, oriented, stable, and assisted out of the department via wheelchair at the time of discharge. Discharged with prescriptions x2.       Nikolai Shelley RN  10/12/21 8054

## 2021-10-12 NOTE — ED NOTES
Call placed to transfer center for admission to [facility], [facility].      Reagan Cooper, EMT-P  10/12/21 0003

## 2021-10-12 NOTE — ED NOTES
Called [facility] to obtain hx and home medications. Hx obtained. [facility]. Pt sts she uses [facility] DR ADOLPH BONNER in [facility].   Staff to attempt to contact in [address] [place] [address] [place] [address]  10/12/21 0836

## 2021-11-29 PROBLEM — I48.0 PAROXYSMAL ATRIAL FIBRILLATION (HCC): Status: ACTIVE | Noted: 2021-01-01

## 2021-11-29 PROBLEM — I63.9 STROKE (CEREBRUM) (HCC): Status: ACTIVE | Noted: 2021-01-01

## 2021-11-29 PROBLEM — Z86.14 HISTORY OF MRSA INFECTION: Status: ACTIVE | Noted: 2021-01-01

## 2021-11-29 PROBLEM — Z86.73 PERSONAL HISTORY OF TRANSIENT ISCHEMIC ATTACK (TIA), AND CEREBRAL INFARCTION WITHOUT RESIDUAL DEFICITS: Status: ACTIVE | Noted: 2021-01-01

## 2021-11-29 PROBLEM — E11.9 T2DM (TYPE 2 DIABETES MELLITUS) (HCC): Status: ACTIVE | Noted: 2021-01-01

## 2021-11-29 PROBLEM — I10 HTN (HYPERTENSION): Status: ACTIVE | Noted: 2021-01-01

## 2021-11-29 PROBLEM — U07.1 COVID-19 VIRUS DETECTED: Status: ACTIVE | Noted: 2021-01-01

## 2021-11-29 PROBLEM — I10 ESSENTIAL HYPERTENSION: Chronic | Status: ACTIVE | Noted: 2021-01-01

## 2021-11-29 PROBLEM — I48.91 ATRIAL FIBRILLATION (HCC): Status: ACTIVE | Noted: 2021-01-01

## 2021-11-29 PROBLEM — I50.32 CHRONIC DIASTOLIC CONGESTIVE HEART FAILURE (HCC): Status: ACTIVE | Noted: 2021-01-01

## 2021-11-29 PROBLEM — I50.9 CHF (CONGESTIVE HEART FAILURE) (HCC): Status: ACTIVE | Noted: 2021-01-01

## 2021-11-29 PROBLEM — R53.1 RIGHT SIDED WEAKNESS: Status: ACTIVE | Noted: 2021-01-01

## 2021-11-30 PROBLEM — R79.89 ELEVATED SERUM CREATININE: Status: ACTIVE | Noted: 2021-01-01

## 2021-11-30 PROBLEM — R09.89 SUSPECTED CEREBROVASCULAR ACCIDENT (CVA): Status: ACTIVE | Noted: 2021-01-01

## 2021-11-30 PROBLEM — I50.33 ACUTE ON CHRONIC DIASTOLIC CHF (CONGESTIVE HEART FAILURE) (HCC): Status: ACTIVE | Noted: 2021-01-01

## 2021-11-30 PROBLEM — I25.10 CORONARY ARTERY DISEASE INVOLVING NATIVE CORONARY ARTERY OF NATIVE HEART: Status: ACTIVE | Noted: 2021-01-01

## 2021-12-01 NOTE — PLAN OF CARE
Goal Outcome Evaluation:  Plan of Care Reviewed With: patient        Progress: declining  Outcome Summary: Pt. noted with increased R sided weakness face, UE and LE.  Today pt. with no AAROM elbow, wrist and hand on R as did on evaluation. Pt. more inattentive to R side, but turns and looks to right when cued.  Per nurse MD aware of changes and reported the MD stated pt. would wax and wan.  Continue OT POC as pt. tolerates.

## 2021-12-01 NOTE — PLAN OF CARE
Goal Outcome Evaluation:  Plan of Care Reviewed With: patient        Progress: improving  Outcome Summary: Pt VSS and on 2 L/NC. Pt alert and oriented, up with assist x2 and mechanical lift. Pt with no complaints throughout the day. Pt up in chair most of day, assisted back to bed this evening. Will continue to monitor.

## 2021-12-01 NOTE — PLAN OF CARE
Goal Outcome Evaluation:    SLP treatment completed. Will continue to address dysphagia and communication in tx. Please see note for further details and recommendations.

## 2021-12-01 NOTE — PLAN OF CARE
Goal Outcome Evaluation:  Plan of Care Reviewed With: patient        Progress: no change  Outcome Summary: VSS. Pt currently on 1L NC due to desating to the mid 70s a couple random times for a few seconds. Pt remains NSR on the monitor. Pt slept in the chair all night at her request. No change with the neurovascular checks. Pt's NIH remains a 10 with no signs of change during the shift like yesterday morning. No pain noted during the shift.

## 2021-12-01 NOTE — THERAPY TREATMENT NOTE
Acute Care - Speech Language Pathology Treatment Note  [facility]     Patient Name: Otilia Givens  : 1950  MRN: [id number]  Today's Date: 2021               Admit Date: 2021     Visit Dx:    ICD-10-CM ICD-9-CM   1. Dysarthria  R47.1 784.51   2. Dysphagia, unspecified type  R13.10 787.20   3. COVID-19 virus detected  U07.1 079.89     Patient Active Problem List   Diagnosis   • Paroxysmal atrial fibrillation (HCC)   • COVID-19 virus detected   • Right sided weakness   • T2DM (type 2 diabetes mellitus) (HCC)   • Essential hypertension   • Personal history of transient ischemic attack (TIA), and cerebral infarction without residual deficits   • Acute on chronic diastolic CHF (congestive heart failure) (HCC)   • History of MRSA infection   • Coronary artery disease involving native coronary artery of native heart   • Suspected subacute cerebrovascular accident (CVA)   • Elevated serum creatinine     Past Medical History:   Diagnosis Date   • Afib (HCC)    • CHF (congestive heart failure) (HCC)    • Diabetes (HCC)    • GERD (gastroesophageal reflux disease)    • Hypertension    • Stroke (HCC)      Past Surgical History:   Procedure Laterality Date   • HYSTERECTOMY     • TOE AMPUTATION Left        SLP Recommendation and Plan  SLP Diagnosis: Pt presents w/ moderate dysarthria; language and cognition seem to be grossly functional. (21)        Swallow Criteria for Skilled Therapeutic Interventions Met: demonstrates skilled criteria (21)  SLC Criteria for Skilled Therapy Interventions Met: yes (21)  Anticipated Discharge Disposition (SLP): unknown, anticipate therapy at next level of care (21)     Therapy Frequency (Swallow): PRN (21)  Predicted Duration Therapy Intervention (Days): until discharge (21)  Daily Summary of Progress (SLP): progress toward functional goals as expected (21)                 SLP EVALUATION (last 72 hours)      SLP SLC Evaluation     Row Name 12/01/21 1410 11/29/21 0920                Communication Assessment/Intervention    Document Type therapy note (daily note)  -CH evaluation  -EN       Subjective Information no complaints  -CH no complaints  -EN       Patient Observations alert; cooperative; agree to therapy  -CH alert; cooperative; agree to therapy  -EN       Patient/Family/Caregiver Comments/Observations No family present, in isolation d/t Covid +  -CH no family present  -EN       Patient Effort good  -CH good  -EN       Symptoms Noted During/After Treatment none  -CH none  -EN                General Information    Patient Profile Reviewed yes  -CH yes  -EN       Precautions/Limitations, Vision -- for purposes of eval; WFL with corrective lenses  -EN       Precautions/Limitations, Hearing -- WFL; for purposes of eval  -EN       Prior Level of Function-Communication -- WFL  -EN       Plans/Goals Discussed with -- patient; agreed upon  -EN       Barriers to Rehab -- none identified  -EN       Patient's Goals for Discharge -- return to home  -EN                Pain    Additional Documentation Pain Scale: FACES Pre/Post-Treatment (Group)  -CH Pain Scale: FACES Pre/Post-Treatment (Group)  -EN                Pain Scale: FACES Pre/Post-Treatment    Pain: FACES Scale, Pretreatment 0-->no hurt  -CH 0-->no hurt  -EN       Posttreatment Pain Rating 0-->no hurt  -CH 0-->no hurt  -EN                Comprehension Assessment/Intervention    Comprehension Assessment/Intervention -- Auditory Comprehension  -EN                Auditory Comprehension Assessment/Intervention    Auditory Comprehension (Communication) -- WFL  -EN       Able to Identify Objects/Pictures (Communication) -- body part; familiar objects; pictures of common objects; WFL  -EN       Answers Questions (Communication) -- yes/no; wh questions; personal; simple; complex; abstract; WFL  -EN       Able to Follow Commands (Communication) -- 2-step; 3-step; WFL  -EN        Narrative Discourse -- simple paragraph level; conversational level; WFL  -EN                Expression Assessment/Intervention    Expression Assessment/Intervention -- verbal expression  -EN                Verbal Expression Assessment/Intervention    Verbal Expression -- WFL  -EN       Automatic Speech (Communication) -- response to greeting; WFL  -EN       Repetition -- sounds; words; phrases; sentences; WFL  -EN       Phrase Completion -- automatic/predictable; unpredictable; WFL  -EN       Responsive Naming -- simple; complex; WFL  -EN       Confrontational Naming -- high frequency; low frequency; WFL  -EN       Spontaneous/Functional Words -- simple; complex; WFL  -EN       Sentence Formulation -- simple; complex; WFL  -EN       Conversational Discourse/Fluency -- WFL  -EN                Oral Motor Structure and Function    Oral Motor Structure and Function -- WFL  -EN       Dentition Assessment -- natural, present and adequate  -EN       Mucosal Quality -- moist, healthy  -EN                Oral Musculature and Cranial Nerve Assessment    Oral Motor General Assessment -- oral labial or buccal impairment  -EN       Oral Labial or Buccal Impairment, Detail, Cranial Nerve VII (Facial): -- right labial droop  -EN                Motor Speech Assessment/Intervention    Motor Speech Function -- moderate impairment  -EN       Characteristics Consistent with Dysarthria -- slow rate; decreased articulation; hypernasality; slurred speech  -EN                Cursory Voice Assessment/Intervention    Quality and Resonance (Voice) -- WFL  -EN                Cognitive Assessment Intervention- SLP    Cognitive Function (Cognition) -- WFL  -EN       Orientation Status (Cognition) -- awareness of basic personal information; person; place; time; situation; WFL  -EN       Memory (Cognitive) -- simple; short-term; long-term; delayed; WFL  -EN       Attention (Cognitive) -- selective; sustained; WFL  -EN       Thought  Organization (Cognitive) -- concrete divergent; abstract divergent; drawing conclusions; verbal sequencing; WNL  -EN       Reasoning (Cognitive) -- simple; deductive; mental flexibility; WFL  -EN       Problem Solving (Cognitive) -- simple; temporal; WFL  -EN       Executive Function (Cognition) -- WFL  -EN       Pragmatics (Communication) -- WFL  -EN       Right Hemisphere Function -- WFL  -EN                SLP Evaluation Clinical Impressions    SLP Diagnosis Pt presents w/ moderate dysarthria; language and cognition seem to be grossly functional.  -CH Pt presents w/ moderate dysarthria; language and cognition seem to be grossly functional.  -EN       Rehab Potential/Prognosis good  -CH good  -EN       SLC Criteria for Skilled Therapy Interventions Met yes  -CH yes  -EN       Functional Impact functional impact in ADLs; difficulty communicating in an emergency  -CH functional impact in ADLs; difficulty communicating in an emergency  -EN                SLP Treatment Clinical Impressions    Treatment Assessment (SLP) -- SLC and CSE completed. Will f/u for therapy and assessment of diet tolerance.  -EN       Daily Summary of Progress (SLP) progress toward functional goals as expected  - --       Plan for Continued Treatment (SLP) continue treatment per plan of care  - continue treatment per plan of care  -EN       Care Plan Review care plan/treatment goals reviewed  - evaluation/treatment results reviewed; care plan/treatment goals reviewed; risks/benefits reviewed; current/potential barriers reviewed; patient/other agree to care plan  -EN                Recommendations    Therapy Frequency (SLP SLC) 3 days per week  - 3 days per week  -EN       Predicted Duration Therapy Intervention (Days) until discharge  -CH until discharge  -EN       Anticipated Discharge Disposition (SLP) unknown; anticipate therapy at next level of care  -CH unknown; anticipate therapy at next level of care  -EN             User Key   (r) = Recorded By, (t) = Taken By, (c) = Cosigned By    Initials Name Effective Dates    CH Nataliia Hernandez, MS CCC-SLP 06/16/21 -     EN Cindy Singletary MS, CFY-SLP 05/20/21 -                    EDUCATION  The patient has been educated in the following areas:     Cognitive Impairment Communication Impairment.           SLP GOALS     Row Name 12/01/21 1410 11/29/21 0920          Oral Nutrition/Hydration Goal 1 (SLP)    Oral Nutrition/Hydration Goal 1, SLP LTG: Pt will tolerate soft choppeddiet and thin liquids w/o s/s of aspiration or distress.  -CH LTG: Pt will tolerate regular diet and thin liquids w/o s/s of aspiration or distress.  -EN     Time Frame (Oral Nutrition/Hydration Goal 1, SLP) by discharge  -CH by discharge  -EN     Barriers (Oral Nutrition/Hydration Goal 1, SLP) Unable to sufficiently masticate regular solids and had to expectorate. DIet changed to soft, chopped and thin liquids. Goal modified to reflect this.  -CH --     Progress/Outcomes (Oral Nutrition/Hydration Goal 1, SLP) continuing progress toward goal  -CH --            Oral Nutrition/Hydration Goal 2 (SLP)    Oral Nutrition/Hydration Goal 2, SLP Pt will tolerate trials of soft solids and thin liquids w/o s/s of aspiration w/ 100% acc and no cues.  -CH Pt will tolerate trials of regular solids and thin liquids w/o s/s of aspiration w/ 100% acc and no cues.  -EN     Time Frame (Oral Nutrition/Hydration Goal 2, SLP) short term goal (STG)  -CH short term goal (STG)  -EN     Barriers (Oral Nutrition/Hydration Goal 2, SLP) Unable to sufficiently masticate regular solids and had to expectorate. DIet changed to soft, chopped and thin liquids. Goal modified to reflect this.  -CH --     Progress/Outcomes (Oral Nutrition/Hydration Goal 2, SLP) continuing progress toward goal  -CH --            Reduce Perception of Hypernasality Goal 1 (SLP)    Reduce Perception of Hypernasality By Goal 1 (SLP) opening mouth wider for speech; 80%; with minimal cues  (75-90%)  -CH opening mouth wider for speech; 80%; with minimal cues (75-90%)  -EN     Time Frame (Perception of Hypernasality Goal 1, SLP) short term goal (STG)  -CH short term goal (STG)  -EN     Progress (Perception of Hypernasality Goal 1, SLP) 60%; with moderate cues (50-74%)  -CH --     Progress/Outcomes (Perception of Hypernasality Goal 1, SLP) continuing progress toward goal  -CH --            Articulation Goal 1 (SLP)    Improve Articulation Goal 1 (SLP) by over-articulating at word level; by over-articulating at phrase level; by over-articulating in connected speech; 80%; with minimal cues (75-90%)  -CH by over-articulating at word level; by over-articulating at phrase level; by over-articulating in connected speech; 80%; with minimal cues (75-90%)  -EN     Time Frame (Articulation Goal 1, SLP) short term goal (STG)  -CH short term goal (STG)  -EN     Progress (Articulation Goal 1, SLP) 70%; with minimal cues (75-90%)  -CH --     Progress/Outcomes (Articulation Goal 1, SLP) continuing progress toward goal  -CH --            Additional Goal 1 (SLP)    Additional Goal 1, SLP LTG: Pt will improve speech intelligibility in order to be able to increase participation in conversations and care at this level.  -CH LTG: Pt will improve speech intelligibility in order to be able to increase participation in conversations and care at this level.  -EN     Time Frame (Additional Goal 1, SLP) by discharge  -CH by discharge  -EN     Progress/Outcomes (Additional Goal 1, SLP) continuing progress toward goal  -CH --           User Key  (r) = Recorded By, (t) = Taken By, (c) = Cosigned By    Initials Name Provider Type    CH Nataliia Hernandez, MS CCC-SLP Speech and Language Pathologist    Cindy Ardon MS, CFY-SLP Speech and Language Pathologist                        Time Calculation:      Time Calculation- SLP     Row Name 12/01/21 1619             Time Calculation- SLP    SLP Start Time 1410  -      SLP Received  On 21  -CH              Untimed Charges    00027-CL Treatment/ST Modification Prosth Aug Alter  40  -CH      21948-JD Treatment Swallow Minutes 38  -CH              Total Minutes    Untimed Charges Total Minutes 78  -CH       Total Minutes 78  -CH            User Key  (r) = Recorded By, (t) = Taken By, (c) = Cosigned By    Initials Name Provider Type    Nataliia Salgado, MS CCC-SLP Speech and Language Pathologist                Therapy Charges for Today     Code Description Service Date Service Provider Modifiers Qty    70978722885 HC ST TREATMENT SPEECH 3 2021 Hernandez, Nataliia, MS CCC-SLP GN 1    [id number] HC ST TREATMENT SWALLOW 3 2021 Nataliia Hernandez, MS CCC-SLP GN 1                     Nataliia Hernandez MS CCC-SLP  2021 and Acute Care - Speech Language Pathology   Swallow Treatment Note  [facility]     Patient Name: Otilia Givens  : 1950  MRN: [id number]  Today's Date: 2021               Admit Date: 2021    Visit Dx:     ICD-10-CM ICD-9-CM   1. Dysarthria  R47.1 784.51   2. Dysphagia, unspecified type  R13.10 787.20   3. COVID-19 virus detected  U07.1 079.89     Patient Active Problem List   Diagnosis   • Paroxysmal atrial fibrillation (HCC)   • COVID-19 virus detected   • Right sided weakness   • T2DM (type 2 diabetes mellitus) (HCC)   • Essential hypertension   • Personal history of transient ischemic attack (TIA), and cerebral infarction without residual deficits   • Acute on chronic diastolic CHF (congestive heart failure) (HCC)   • History of MRSA infection   • Coronary artery disease involving native coronary artery of native heart   • Suspected subacute cerebrovascular accident (CVA)   • Elevated serum creatinine     Past Medical History:   Diagnosis Date   • Afib (HCC)    • CHF (congestive heart failure) (HCC)    • Diabetes (HCC)    • GERD (gastroesophageal reflux disease)    • Hypertension    • Stroke (HCC)      Past Surgical History:   Procedure  Laterality Date   • HYSTERECTOMY     • TOE AMPUTATION Left        SLP Recommendation and Plan     SLP Diet Recommendation: soft textures, chopped, thin liquids, other (see comments) (add gravy/sauce for moisture) (12/01/21 1410)  Recommended Precautions and Strategies: upright posture during/after eating, small bites of food and sips of liquid, check mouth frequently for oral residue/pocketing, general aspiration precautions, assist with feeding (12/01/21 1410)  SLP Rec. for Method of Medication Administration: meds whole, with thin liquids, with pudding or applesauce, as tolerated (12/01/21 1410)     Monitor for Signs of Aspiration: yes, notify SLP if any concerns (12/01/21 1410)     Swallow Criteria for Skilled Therapeutic Interventions Met: demonstrates skilled criteria (12/01/21 1410)  Anticipated Discharge Disposition (SLP): unknown, anticipate therapy at next level of care (12/01/21 1410)  Rehab Potential/Prognosis, Swallowing: good, to achieve stated therapy goals (12/01/21 1410)  Therapy Frequency (Swallow): PRN (12/01/21 1410)  Predicted Duration Therapy Intervention (Days): until discharge (12/01/21 1410)     Daily Summary of Progress (SLP): progress toward functional goals as expected (12/01/21 1410)                          SWALLOW EVALUATION (last 72 hours)     SLP Adult Swallow Evaluation     Row Name 12/01/21 1410 11/29/21 0920                General Information    Pertinent History Of Current Problem -- Pt adm w/ R side weakness. Hx of afib, DM2, GERD, HTN, TIA, MRSA, woulds. Failed RN dysphagia screen, SLP consulted for swallow evaluation. All imaging negative for acute CVA.  -EN       Current Method of Nutrition -- regular textures; thin liquids  -EN       Prior Level of Function-Communication -- WFL  -EN       Prior Level of Function-Swallowing -- unknown  -EN       Patient's Goals for Discharge -- patient did not state  -EN                Oral Motor Structure and Function    Secretion Management  -- WNL/WFL  -EN                General Eating/Swallowing Observations    Respiratory Support Currently in Use -- room air  -EN       Eating/Swallowing Skills -- self-fed; fed by SLP  -EN       Positioning During Eating -- upright 90 degree; upright in bed  -EN       Utensils Used -- cup; straw  -EN       Consistencies Trialed -- regular textures; thin liquids; mixed consistency  -EN                Respiratory    Respiratory Status -- WFL  -EN                Clinical Swallow Eval    Oral Prep Phase -- impaired  -EN       Oral Transit -- WFL  -EN       Oral Residue -- impaired  -EN       Pharyngeal Phase -- no overt signs/symptoms of pharyngeal impairment  -EN       Esophageal Phase -- unremarkable  -EN       Clinical Swallow Evaluation Summary -- CSE this AM: no s/s of aspiration in all trials presented. Pt noted to have piece of scrambled egg on tongue upon SLP arrival; once SLP made pt aware of lingual residue, pt able to clear w/ liquid wash. Recommend continuation of regular diet and thin liquids. SLP to f/u for assessment of diet tolerance. Pt would benefit from feeding assistance PRN as right side weakness affecting self-feeding skills.  -EN                Oral Prep Concerns    Oral Prep Concerns -- inefficient mastication  -EN       Inefficient Mastication -- regular consistencies  -EN                Oral Residue Concerns    Oral Residue Concerns -- other (see comments)  lingual residue  -EN                Clinical Impression    SLP Swallowing Diagnosis -- R/O pharyngeal dysphagia; mild; oral dysphagia  -EN       Functional Impact risk of aspiration/pneumonia  -CH risk of aspiration/pneumonia  -EN       Rehab Potential/Prognosis, Swallowing good, to achieve stated therapy goals  -CH good, to achieve stated therapy goals  -EN       Swallow Criteria for Skilled Therapeutic Interventions Met demonstrates skilled criteria  -CH demonstrates skilled criteria  -EN                SLP Treatment Clinical Impressions     Treatment Assessment (SLP) Patient with prolonged mastication of regular solids and expectorated items she was unable to chew. DIet consistency changed to soft, chopped and thin liquids. SLP to follow up for diet tolerance. Patient continues with dysarthria. She participated well with tx this date.  -CH --                Recommendations    Therapy Frequency (Swallow) PRN  -CH PRN  -EN       SLP Diet Recommendation soft textures; chopped; thin liquids; other (see comments)  add gravy/sauce for moisture  -CH regular textures; thin liquids  -EN       Recommended Precautions and Strategies upright posture during/after eating; small bites of food and sips of liquid; check mouth frequently for oral residue/pocketing; general aspiration precautions; assist with feeding  -CH upright posture during/after eating; small bites of food and sips of liquid; check mouth frequently for oral residue/pocketing; general aspiration precautions; assist with feeding  -EN       Oral Care Recommendations Oral Care BID/PRN  -CH Oral Care BID/PRN  -EN       SLP Rec. for Method of Medication Administration meds whole; with thin liquids; with pudding or applesauce; as tolerated  -CH meds whole; with thin liquids; with pudding or applesauce; as tolerated  -EN       Monitor for Signs of Aspiration yes; notify SLP if any concerns  - yes; notify SLP if any concerns  -EN             User Key  (r) = Recorded By, (t) = Taken By, (c) = Cosigned By    Initials Name Effective Dates     Nataliia Hernandez, MS CCC-SLP 06/16/21 -     EN Cindy Singletary MS, LETTY-SLP 05/20/21 -                 EDUCATION  The patient has been educated in the following areas:   Dysphagia (Swallowing Impairment) Oral Care/Hydration Modified Diet Instruction.        SLP GOALS     Row Name 12/01/21 1410 11/29/21 0920          Oral Nutrition/Hydration Goal 1 (SLP)    Oral Nutrition/Hydration Goal 1, SLP LTG: Pt will tolerate soft choppeddiet and thin liquids w/o s/s of  aspiration or distress.  -CH LTG: Pt will tolerate regular diet and thin liquids w/o s/s of aspiration or distress.  -EN     Time Frame (Oral Nutrition/Hydration Goal 1, SLP) by discharge  -CH by discharge  -EN     Barriers (Oral Nutrition/Hydration Goal 1, SLP) Unable to sufficiently masticate regular solids and had to expectorate. DIet changed to soft, chopped and thin liquids. Goal modified to reflect this.  -CH --     Progress/Outcomes (Oral Nutrition/Hydration Goal 1, SLP) continuing progress toward goal  -CH --            Oral Nutrition/Hydration Goal 2 (SLP)    Oral Nutrition/Hydration Goal 2, SLP Pt will tolerate trials of soft solids and thin liquids w/o s/s of aspiration w/ 100% acc and no cues.  -CH Pt will tolerate trials of regular solids and thin liquids w/o s/s of aspiration w/ 100% acc and no cues.  -EN     Time Frame (Oral Nutrition/Hydration Goal 2, SLP) short term goal (STG)  -CH short term goal (STG)  -EN     Barriers (Oral Nutrition/Hydration Goal 2, SLP) Unable to sufficiently masticate regular solids and had to expectorate. DIet changed to soft, chopped and thin liquids. Goal modified to reflect this.  -CH --     Progress/Outcomes (Oral Nutrition/Hydration Goal 2, SLP) continuing progress toward goal  -CH --            Reduce Perception of Hypernasality Goal 1 (SLP)    Reduce Perception of Hypernasality By Goal 1 (SLP) opening mouth wider for speech; 80%; with minimal cues (75-90%)  -CH opening mouth wider for speech; 80%; with minimal cues (75-90%)  -EN     Time Frame (Perception of Hypernasality Goal 1, SLP) short term goal (STG)  -CH short term goal (STG)  -EN     Progress (Perception of Hypernasality Goal 1, SLP) 60%; with moderate cues (50-74%)  -CH --     Progress/Outcomes (Perception of Hypernasality Goal 1, SLP) continuing progress toward goal  -CH --            Articulation Goal 1 (SLP)    Improve Articulation Goal 1 (SLP) by over-articulating at word level; by over-articulating at  phrase level; by over-articulating in connected speech; 80%; with minimal cues (75-90%)  -CH by over-articulating at word level; by over-articulating at phrase level; by over-articulating in connected speech; 80%; with minimal cues (75-90%)  -EN     Time Frame (Articulation Goal 1, SLP) short term goal (STG)  -CH short term goal (STG)  -EN     Progress (Articulation Goal 1, SLP) 70%; with minimal cues (75-90%)  -CH --     Progress/Outcomes (Articulation Goal 1, SLP) continuing progress toward goal  -CH --            Additional Goal 1 (SLP)    Additional Goal 1, SLP LTG: Pt will improve speech intelligibility in order to be able to increase participation in conversations and care at this level.  -CH LTG: Pt will improve speech intelligibility in order to be able to increase participation in conversations and care at this level.  -EN     Time Frame (Additional Goal 1, SLP) by discharge  -CH by discharge  -EN     Progress/Outcomes (Additional Goal 1, SLP) continuing progress toward goal  -CH --           User Key  (r) = Recorded By, (t) = Taken By, (c) = Cosigned By    Initials Name Provider Type    Nataliia Salgado MS CCC-SLP Speech and Language Pathologist    Cindy Ardon MS, CFY-SLP Speech and Language Pathologist                   Time Calculation:    Time Calculation- SLP     Row Name 12/01/21 1614             Time Calculation- SLP    SLP Start Time 1410  -      SLP Received On 12/01/21  -              Untimed Charges    95108-XX Treatment/ST Modification Prosth Aug Alter  40  -CH      69439-FO Treatment Swallow Minutes 38  -CH              Total Minutes    Untimed Charges Total Minutes 78  -CH       Total Minutes 78  -CH            User Key  (r) = Recorded By, (t) = Taken By, (c) = Cosigned By    Initials Name Provider Type    Nataliia Salgado MS CCC-SLP Speech and Language Pathologist                Therapy Charges for Today     Code Description Service Date Service Provider Modifiers Qty     [id number]  ST TREATMENT SPEECH 3 12/1/2021 Nataliia Hernandez, MS CCC-SLP GN 1    [id number] HC ST TREATMENT SWALLOW 3 12/1/2021 Nataliia Hernandez, MS CCC-SLP GN 1               Nataliia Hernandez, MS CCC-SLP  12/1/2021     Patient was not wearing a face mask and did exhibit coughing during this therapy encounter.  Procedure performed was aerosolizing, involved close contact (within 6 feet for at least 15 minutes or longer), and did not involve contact with infectious secretions or specimens.  Therapist used appropriate personal protective equipment including gloves, standard procedure mask, eye protection, gown and N95 mask.  Appropriate PPE was worn during the entire therapy session.  Hand hygiene was completed before and after therapy session.

## 2021-12-01 NOTE — THERAPY TREATMENT NOTE
Patient Name: Otilia Givens  : 1950    MRN: [id number]                              Today's Date: 2021       Admit Date: 2021    Visit Dx:     ICD-10-CM ICD-9-CM   1. Dysarthria  R47.1 784.51   2. Dysphagia, unspecified type  R13.10 787.20     Patient Active Problem List   Diagnosis   • Paroxysmal atrial fibrillation (HCC)   • COVID-19 virus detected   • Right sided weakness   • T2DM (type 2 diabetes mellitus) (HCC)   • Essential hypertension   • Personal history of transient ischemic attack (TIA), and cerebral infarction without residual deficits   • Acute on chronic diastolic CHF (congestive heart failure) (HCC)   • History of MRSA infection   • Coronary artery disease involving native coronary artery of native heart   • Suspected subacute cerebrovascular accident (CVA)   • Elevated serum creatinine     Past Medical History:   Diagnosis Date   • Afib (HCC)    • CHF (congestive heart failure) (HCC)    • Diabetes (HCC)    • GERD (gastroesophageal reflux disease)    • Hypertension    • Stroke (HCC)      Past Surgical History:   Procedure Laterality Date   • HYSTERECTOMY     • TOE AMPUTATION Left       General Information     Row Name 21 1248          OT Time and Intention    Document Type therapy note (daily note)  -ALICIA     Mode of Treatment individual therapy; occupational therapy  -     Row Name 21 1248          General Information    Patient Profile Reviewed yes  -ALICIA     Existing Precautions/Restrictions fall  R sided weakness, contact and airborne  -ALICIA     Barriers to Rehab medically complex  -ALICIA     Row Name 21 1248          Cognition    Orientation Status (Cognition) oriented x 3  -ALICIA     Row Name 21 1248          Safety Issues, Functional Mobility    Impairments Affecting Function (Mobility) balance; endurance/activity tolerance; motor control; muscle tone abnormal; postural/trunk control; range of motion (ROM); sensation/sensory awareness; strength; visual/perceptual   "-ALICIA           User Key  (r) = Recorded By, (t) = Taken By, (c) = Cosigned By    Initials Name Provider Type    Samaria San, OT Occupational Therapist               Lymphedema     Row Name 11/30/21 0825             Subjective Pain    Able to rate subjective pain? yes  -LH      Recorded by [LH] Derik Ponce, PT              Lymphedema Edema Assessment    Ptting Edema Category By severity  -      Pitting Edema Mild  -LH      Recorded by [LH] Derik Ponce, PT              Skin Changes/Observations    Location/Assessment Lower Extremity  -      Lower Extremity Conditions bilateral:; intact; dry; scaly; crust; inflamed; fragile  -      Lower Extremity Color/Pigment bilateral:; erythema; red; hyperpigmented  -LH      Recorded by [LH] Derik Ponce, PT              Lymphedema Pulses/Capillary Refill    Lymphedema Pulses/Capillary Refill lower extremity pulses  -      Dorsalis Pedis Pulse right:; +2 normal; left:; +1 diminished  -LH      Posterior Tibialis Pulse right:; +2 normal; left:; +1 diminished  -LH      Recorded by [LH] Derik Ponce, PT              Lymphedema Measurements    Measurement Type(s) Quick Girth  -      Quick Girth Areas Lower extremities  -LH      Recorded by [LH] Derik Ponce, PT              LLE Quick Girth (cm)    Mid foot 23.4 cm  -LH      Smallest ankle 21.8 cm  -LH      Largest calf 35.6 cm  -LH      Recorded by [LH] Derik Ponce, PT              RLE Quick Girth (cm)    Mid foot 23.1 cm  -LH      Smallest ankle 22.7 cm  -LH      Largest calf 35 cm  -LH      RLE Quick Girth Total 80.8  -LH      Recorded by [LH] Derik Ponce, PT              Compression/Skin Care    Compression/Skin Care skin care; wrapping location  -      Skin Care washed/dried; lotion applied  -      Wrapping Location lower extremity  -      Wrapping Location LE bilateral:; foot to knee  -      Wrapping Comments BLE unna boots applied in figure eight pattern with 4\" coban and " spandage for light compression.  -LH      Recorded by [LH] Derik Ponce D, PT            User Key  (r) = Recorded By, (t) = Taken By, (c) = Cosigned By    Initials Name Effective Dates     Derik Ponce, PT 09/21/21 -                Mobility/ADL's     Row Name 12/01/21 1303          Bed Mobility    Comment (Bed Mobility) Up in chair on arrival  -ALICIA     Row Name 12/01/21 1303          Transfers    Comment (Transfers) Pt with increased weakness, did not attempt.  Per nurse MD stated pt. deficits would wax and wan and not concerned about changes.  Pt.'s chair to small, used lift to raise pt. and lower in larger chair procured with nursing assist.  -ALICIA     Row Name 12/01/21 1303          Activities of Daily Living    BADL Assessment/Intervention grooming  -ALICIA     Row Name 12/01/21 1303          Grooming Assessment/Training    [facility] Level (Grooming) hair care, combing/brushing; minimum assist (75% patient effort); wash face, hands; set up; standby assist  -ALICIA     Position (Grooming) supported sitting  -ALICIA     Comment (Grooming) pt. also able to place and remove glasses  -ALICIA           User Key  (r) = Recorded By, (t) = Taken By, (c) = Cosigned By    Initials Name Provider Type    Samaria San, OT Occupational Therapist               Obj/Interventions     Row Name 12/01/21 1305          Sensory Interventions    Comment, Sensory Intervention pt. increased difficulty ID location touch RUE today especially distally  -ALICIA     Row Name 12/01/21 1305          Vision Assessment/Intervention    Vision Assessment Comment pt. read clock one number off and was able to read mild carton portion that was large print accurately, some R sided inattention noted  -ALICIA     Row Name 12/01/21 1305          Shoulder (Therapeutic Exercise)    Shoulder (Therapeutic Exercise) AROM (active range of motion)  -     Shoulder AROM (Therapeutic Exercise) left; flexion; extension; horizontal aBduction/aDduction; 10 repetitions;  sitting  -ALICIA     Shoulder AAROM (Therapeutic Exercise) right; flexion; extension; aBduction; aDduction; 10 repetitions; sitting  -     Row Name 12/01/21 1305          Elbow/Forearm (Therapeutic Exercise)    Elbow/Forearm (Therapeutic Exercise) PROM (passive range of motion); AROM (active range of motion); strengthening exercise  -     Elbow/Forearm PROM (Therapeutic Exercise) right; bilateral; flexion; extension; supination; pronation; sitting; 10 second hold  -     Elbow/Forearm Strengthening (Therapeutic Exercise) left; flexion; extension; 10 repetitions; sitting  manual resistance  -     Row Name 12/01/21 1305          Wrist (Therapeutic Exercise)    Wrist (Therapeutic Exercise) PROM (passive range of motion)  -     Wrist PROM (Therapeutic Exercise) right; flexion; extension; 10 repetitions  -     Row Name 12/01/21 1305          Hand (Therapeutic Exercise)    Hand (Therapeutic Exercise) PROM (passive range of motion)  -     Hand PROM (Therapeutic Exercise) right; finger flexion; finger extension; finger aBduction; finger aDduction; 10 repetitions  -     Row Name 12/01/21 1305          Motor Skills    Functional Endurance fair  -           User Key  (r) = Recorded By, (t) = Taken By, (c) = Cosigned By    Initials Name Provider Type    Samaria San, OT Occupational Therapist               Goals/Plan     Row Name 12/01/21 1312          Bed Mobility Goal 1 (OT)    Progress/Outcomes (Bed Mobility Goal 1, OT) goal ongoing  -     Row Name 12/01/21 1312          Transfer Goal 1 (OT)    Progress/Outcome (Transfer Goal 1, OT) goal ongoing  -     Row Name 12/01/21 1312          Grooming Goal 1 (OT)    Progress/Outcome (Grooming Goal 1, OT) good progress toward goal  -     Row Name 12/01/21 1312          Strength Goal 1 (OT)    Progress/Outcome (Strength Goal 1, OT) progress slower than expected  -           User Key  (r) = Recorded By, (t) = Taken By, (c) = Cosigned By    Initials Name  Provider Type    ALICIA Samaria Mccabe, OT Occupational Therapist               Clinical Impression     Row Name 12/01/21 1308          Pain Scale: Numbers Pre/Post-Treatment    Pretreatment Pain Rating 0/10 - no pain  -ALICIA     Posttreatment Pain Rating 0/10 - no pain  -     Row Name 12/01/21 1308          Plan of Care Review    Plan of Care Reviewed With patient  -ALICIA     Progress declining  -     Outcome Summary Pt. noted with increased R sided weakness face, UE and LE.  Today pt. with no AAROM elbow, wrist and hand on R as did on evaluation. Pt. more inattentive to R side, but turns and looks to right when cued.  Per nurse MD aware of changes and reported the MD stated pt. would wax and wan.  Continue OT POC as pt. tolerates.  -     Row Name 12/01/21 1308          Therapy Assessment/Plan (OT)    Therapy Frequency (OT) daily  -     Row Name 12/01/21 1308          Therapy Plan Review/Discharge Plan (OT)    Anticipated Discharge Disposition (OT) inpatient rehabilitation facility  -     Row Name 12/01/21 1308          Vital Signs    Pre Systolic BP Rehab --  stable, not recorded  -ALICIA     Post Systolic BP Rehab 156  -ALICIA     Post Treatment Diastolic BP 95  -ALICIA     Pre SpO2 (%) 80  02 had shifted off pt.'s nose  -ALICIA     O2 Delivery Pre Treatment room air  -ALICIA     Intra SpO2 (%) 96  -ALICIA     O2 Delivery Intra Treatment supplemental O2  -ALICIA     Post SpO2 (%) 98  -ALICIA     O2 Delivery Post Treatment supplemental O2  -ALICIA     Pre Patient Position Sitting  -ALICIA     Intra Patient Position Sitting  -ALICIA     Post Patient Position Sitting  -     Row Name 12/01/21 1308          Positioning and Restraints    Pre-Treatment Position sitting in chair/recliner  -ALICIA     Post Treatment Position chair  -ALICIA     In Chair reclined; call light within reach; encouraged to call for assist; exit alarm on; waffle cushion; on mechanical lift sling; RUE elevated; heels elevated  -ALICIA           User Key  (r) = Recorded By, (t) = Taken By, (c) =  Cosigned By    Initials Name Provider Type    Samaria San OT Occupational Therapist               Outcome Measures     Row Name 12/01/21 1313          How much help from another is currently needed...    Putting on and taking off regular lower body clothing? 1  -ALICIA     Bathing (including washing, rinsing, and drying) 1  -ALICIA     Toileting (which includes using toilet bed pan or urinal) 1  -ALICIA     Putting on and taking off regular upper body clothing 1  -ALICIA     Taking care of personal grooming (such as brushing teeth) 2  -ALICIA     Eating meals 3  -ALICIA     AM-PAC 6 Clicks Score (OT) 9  -     Row Name 12/01/21 0929          How much help from another person do you currently need...    Turning from your back to your side while in flat bed without using bedrails? 1  -JJ     Moving from lying on back to sitting on the side of a flat bed without bedrails? 1  -JJ     Moving to and from a bed to a chair (including a wheelchair)? 1  -JJ     Standing up from a chair using your arms (e.g., wheelchair, bedside chair)? 1  -JJ     Climbing 3-5 steps with a railing? 1  -JJ     To walk in hospital room? 1  -JJ     AM-PAC 6 Clicks Score (PT) 6  -JJ     Row Name 12/01/21 1313          Modified Morgan Scale    Pre-Stroke Modified Houston Scale 6 - Unable to determine (UTD) from the medical record documentation  -ALICIA     Modified Houston Scale 5 - Severe disability.  Bedridden, incontinent, and requiring constant nursing care and attention.  -     Row Name 12/01/21 1313          Functional Assessment    Outcome Measure Options AM-PAC 6 Clicks Daily Activity (OT); Modified Morgan  -           User Key  (r) = Recorded By, (t) = Taken By, (c) = Cosigned By    Initials Name Provider Type    Samaria San, CASSIDY Occupational Therapist    Ramos Zhao, RN Registered Nurse                Occupational Therapy Education                 Title: PT OT SLP Therapies (In Progress)     Topic: Occupational Therapy (In Progress)      Point: ADL training (In Progress)     Description:   Instruct learner(s) on proper safety adaptation and remediation techniques during self care or transfers.   Instruct in proper use of assistive devices.              Learning Progress Summary           Patient Acceptance, E,D, NR by ALICIA at 12/1/2021 1315    Comment: noted changes in strength, ROM and sensation, RUE TE, grooming initiation    Acceptance, E,D, VU,NR by ALICIA at 11/29/2021 1103    Comment: reason for consult, noted deficit, UE TE, but mobility                   Point: Home exercise program (In Progress)     Description:   Instruct learner(s) on appropriate technique for monitoring, assisting and/or progressing therapeutic exercises/activities.              Learning Progress Summary           Patient Acceptance, E,D, NR by ALICIA at 12/1/2021 1315    Comment: noted changes in strength, ROM and sensation, RUE TE, grooming initiation    Acceptance, E,D, VU,NR by ALICIA at 11/29/2021 1103    Comment: reason for consult, noted deficit, UE TE, but mobility                   Point: Precautions (Not Started)     Description:   Instruct learner(s) on prescribed precautions during self-care and functional transfers.              Learner Progress:  Not documented in this visit.          Point: Body mechanics (In Progress)     Description:   Instruct learner(s) on proper positioning and spine alignment during self-care, functional mobility activities and/or exercises.              Learning Progress Summary           Patient Acceptance, E,D, NR by ALICIA at 12/1/2021 1315    Comment: noted changes in strength, ROM and sensation, RUE TE, grooming initiation    Acceptance, E,D, VU,NR by ALICIA at 11/29/2021 1103    Comment: reason for consult, noted deficit, UE TE, but mobility                               User Key     Initials Effective Dates Name Provider Type Discipline    ALICIA 06/16/21 -  Samaria Mccabe OT Occupational Therapist OT              OT Recommendation and Plan  Planned  Therapy Interventions (OT): activity tolerance training, adaptive equipment training, BADL retraining, functional balance retraining, occupation/activity based interventions, patient/caregiver education/training, strengthening exercise, transfer/mobility retraining  Therapy Frequency (OT): daily  Plan of Care Review  Plan of Care Reviewed With: patient  Progress: declining  Outcome Summary: Pt. noted with increased R sided weakness face, UE and LE.  Today pt. with no AAROM elbow, wrist and hand on R as did on evaluation. Pt. more inattentive to R side, but turns and looks to right when cued.  Per nurse MD aware of changes and reported the MD stated pt. would wax and wan.  Continue OT POC as pt. tolerates.     Time Calculation:    Time Calculation- OT     Row Name 12/01/21 1316             Time Calculation- OT    OT Start Time 1056  -ALICIA      OT Received On 12/01/21  -ALICIA      OT Goal Re-Cert Due Date 12/09/21  -ALICIA              Timed Charges    31391 - OT Therapeutic Exercise Minutes 25  -ALICIA      52279 - OT Therapeutic Activity Minutes 8  -ALICIA      28375 - OT Self Care/Mgmt Minutes 8  -ALICIA              Total Minutes    Timed Charges Total Minutes 41  -ALICIA       Total Minutes 41  -ALICIA            User Key  (r) = Recorded By, (t) = Taken By, (c) = Cosigned By    Initials Name Provider Type    Samaria San OT Occupational Therapist              Therapy Charges for Today     Code Description Service Date Service Provider Modifiers Qty    05684648043  OT THERAPEUTIC ACT EA 15 MIN 12/1/2021 Samaria Mccabe OT GO 1    [id number]  OT THER PROC EA 15 MIN 12/1/2021 Samaria Mccabe OT GO 2               Samaria Mccabe OT  12/1/2021

## 2021-12-01 NOTE — PROGRESS NOTES
Cardiology Progress Note      Reason for visit:    · Atrial fibrillation in setting of COVID-19    IDENTIFICATION: 71-year-old female who resides in [facility]    Diagnosis  POA   • **Suspected subacute cerebrovascular accident (CVA) [R09.89]  Yes   • Paroxysmal atrial fibrillation (HCC) [I48.0]  Yes     Priority: High     · [id number] (age, female, DM, TIA, athero)     • Right sided weakness [R53.1]  Yes     Priority: High   • T2DM (type 2 diabetes mellitus) (HCC) [E11.9]  Yes     Priority: High   • Acute on chronic diastolic CHF (congestive heart failure) (HCC) [I50.33]  Yes     Priority: High     · Echo (11/29/2021): LVEF 52%.  Mild to moderate MR.  Mild to moderate TR.  RVSP 45-55 mmHg.     • COVID-19 virus detected [U07.1]  Yes     Priority: Medium   • Essential hypertension [I10]  Yes     Priority: Medium   • History of MRSA infection [Z86.14]  Yes     Priority: Medium     BLE wound infections; currently wrapping BLE q 2 days     • Elevated serum creatinine [R79.89]  Yes            Patient is observed through the window sitting up in the chair.  She is breathing easy with O2 saturations in the high 90s on nasal cannula at 1 L.  She is maintaining normal sinus rhythm.           Vital Sign Min/Max for last 24 hours  Temp  Min: 98 °F (36.7 °C)  Max: 98.4 °F (36.9 °C)   BP  Min: 127/75  Max: 144/85   Pulse  Min: 71  Max: 89   Resp  Min: 16  Max: 19   SpO2  Min: 94 %  Max: 99 %   Flow (L/min)  Min: 1  Max: 2    No intake or output data in the 24 hours ending 12/01/21 0949        Physical Exam  Cardiovascular:      Rate and Rhythm: Normal rate and regular rhythm.       I did not physically enter the room to examine the patient.  I reviewed her chart including lab work, vital signs and spoke with the nurse.    Tele: NSR    Results Review (reviewed the patient's recent labs in the electronic medical record):     EKG (11/30/2021): Sinus tachycardia with PVCs, RBBB, left  anterior fascicular block, bifascicular block    CXR (11/29/2021): Cardiomegaly with patchy airspace disease bilaterally    ECHO (11/29/2021): LVEF 52%.  LVH.  Mild to moderate MR.  Mild to moderate TR.  RVSP 45-55 mmHg.  Negative bubble study    Result Review:  I have personally reviewed the results from the time of this admission to 12/1/2021 09:49 EST and agree with these findings:  [x]  Laboratory  []  Microbiology  [x]  Radiology  [x]  EKG/Telemetry   [x]  Cardiology/Vascular   []  Pathology  []  Old records  []  Other:  Most notable findings include: proBNP 7820, creatinine 1.47, hemoglobin A1c 8.3    Results from last 7 days   Lab Units 11/30/21  0903 11/29/21  0338   SODIUM mmol/L 136 139   POTASSIUM mmol/L 4.3 4.2   CHLORIDE mmol/L 103 102   BUN mg/dL 32* 31*   CREATININE mg/dL 1.58* 1.47*   MAGNESIUM mg/dL 2.3 1.9     Results from last 7 days   Lab Units 11/29/21  1513 11/29/21  0338   TROPONIN T ng/mL 0.027 0.036*     Results from last 7 days   Lab Units 11/30/21  0903 11/29/21  0338   WBC 10*3/mm3 5.24 3.86   HEMOGLOBIN g/dL 11.5* 12.4   HEMATOCRIT % 35.1 37.9   PLATELETS 10*3/mm3 166 184       Lab Results   Component Value Date    HGBA1C 8.30 (H) 11/29/2021       Lab Results   Component Value Date    CHOL 135 11/29/2021    TRIG 105 11/29/2021    HDL 34 (L) 11/29/2021    LDL 81 11/29/2021              Atrial fibrillation  · Diagnosed this admission with symptoms of TIA/CVA  · [id number] score 6, indicating anticoagulation  · Eliquis 5 mg twice daily  · Metoprolol tartrate 25 mg twice daily      Coronary artery disease  · History of PCI 2016  · Continue aspirin 81 mg daily but defer Plavix due to anticoagulation with Eliquis  · Denies chest pain    Chronic diastolic heart failure  · Echo 11/29 normal LVEF 52%.    Type 2 diabetes mellitus  · Uncontrolled hemoglobin A1c 8.3  · Management per hospitalist    Hypertension  · Current /75         · Continue anticoagulation with Eliquis  · Continue  metoprolol twice daily  · Follow-up with cardiology in 6 weeks after discharge  · Please call with any further questions

## 2021-12-01 NOTE — PROGRESS NOTES
[facility] Medicine Services  PROGRESS NOTE    Patient Name: Otilia Givens  : 1950  MRN: [id number]    Date of Admission: 2021  Primary Care Physician: Shannon Aly MD    Subjective   Subjective     CC:  F/U CVA    HPI:  No major complaints this morning. Sitting up in chair. Ate some breakfast. Nausea better compared to yesterday.    ROS:  Gen-no fevers, no chills  CV-no chest pain, no palpitations  Resp-no cough, no dyspnea  GI-no N/V/D, no abd pain    All other systems reviewed and negative except any additional pertinent positives and negatives as discussed in HPI.      Objective   Objective     Vital Signs:   Temp:  [97.7 °F (36.5 °C)-98.4 °F (36.9 °C)] 97.7 °F (36.5 °C)  Heart Rate:  [71-89] 75  Resp:  [16-19] 16  BP: (127-146)/(75-93) 146/87  Flow (L/min):  [1-2] 1     Physical Exam:  With patient's consent, physical exam was conducted via visual telemedicine encounter due to patient's current isolation requirements in the interest of PPE conservation.    Constitutional: No acute distress, awake, alert, nontoxic, normal body habitus, sitting up in chair  HENT: NCAT, MMM, no conjunctival injection  Respiratory: Good effort, nonlabored respirations on 1 liter for comfort  Cardiovascular:  tele with NSR  Musculoskeletal: trace BLE edema, legs currently wrapped  Psychiatric: Appropriate affect, good insight and judgement, cooperative  Neurologic: Oriented x 3, right sided weakness and right facial droop present, but speech seems much more clear today  Skin: No visible rashes, no jaundice seen on exposed skin through window        Results Reviewed:  LAB RESULTS:      Lab 21  0923 21  0903 21  03321  033   WBC 5.45 5.24 3.86  --    HEMOGLOBIN 12.4 11.5* 12.4  --    HEMATOCRIT 38.7 35.1 37.9  --    PLATELETS 161 166 184  --    NEUTROS ABS  --   --  2.45  --    IMMATURE GRANS (ABS)  --   --  0.01  --    LYMPHS ABS  --   --  0.73  --    MONOS ABS  --    --  0.54  --    EOS ABS  --   --  0.12  --    MCV 85.1 83.0 83.5  --    CRP 3.21*  --  2.11*  --    PROCALCITONIN  --   --  0.18  --    LACTATE  --   --  1.5  --    LDH  --   --  200  --    PROTIME  --   --   --  13.2   APTT  --   --   --  35.6   D DIMER QUANT  --   --   --  1.08*         Lab 12/01/21 0923 11/30/21  0903 11/29/21 0338   SODIUM 133* 136 139   POTASSIUM 4.5 4.3 4.2   CHLORIDE 101 103 102   CO2 23.0 23.0 26.0   ANION GAP 9.0 10.0 11.0   BUN 38* 32* 31*   CREATININE 1.88* 1.58* 1.47*   GLUCOSE 182* 183* 236*   CALCIUM 8.1* 7.9* 8.6   MAGNESIUM  --  2.3 1.9   HEMOGLOBIN A1C  --   --  8.30*         Lab 12/01/21 0923 11/29/21 0338   TOTAL PROTEIN 6.5 6.9   ALBUMIN 2.90* 3.00*   GLOBULIN 3.6 3.9   ALT (SGPT) 11 14   AST (SGOT) 21 20   BILIRUBIN 0.6 0.6   ALK PHOS 104 108         Lab 11/29/21  1513 11/29/21  0338 11/29/21 0337   PROBNP  --  7,820.0*  --    TROPONIN T 0.027 0.036*  --    PROTIME  --   --  13.2   INR  --   --  1.03         Lab 11/29/21 0338   CHOLESTEROL 135   LDL CHOL 81   HDL CHOL 34*   TRIGLYCERIDES 105         Lab 11/29/21 0338   FERRITIN 457.20*         Brief Urine Lab Results  (Last result in the past 365 days)      Color   Clarity   Blood   Leuk Est   Nitrite   Protein   CREAT   Urine HCG        11/29/21 0419 Yellow   Clear   Trace   Negative   Negative   >=300 mg/dL (3+)                 Microbiology Results Abnormal     Procedure Component Value - Date/Time    Blood Culture - Blood, Hand, Right [[id number]]  (Normal) Collected: 11/29/21 0536    Lab Status: Preliminary result Specimen: Blood from Hand, Right Updated: 12/01/21 0615     Blood Culture No growth at 2 days    Blood Culture - Blood, Arm, Right [[id number]]  (Normal) Collected: 11/29/21 0337    Lab Status: Preliminary result Specimen: Blood from Arm, Right Updated: 12/01/21 0400     Blood Culture No growth at 2 days    Urine Culture - Urine, Urine, Clean Catch [[id number]] Collected: 11/29/21 9089    Lab Status: Final  result Specimen: Urine, Clean Catch Updated: 11/30/21 1503     Urine Culture 50,000 CFU/mL Mixed Jacey Isolated    Narrative:      Specimen contains mixed organisms of questionable pathogenicity which indicates contamination with commensal jacey.  Further identification is unlikely to provide clinically useful information.  Suggest recollection.          Adult Transthoracic Echo Complete W/ Cont if Necessary Per Protocol (With Agitated Saline)    Result Date: 11/29/2021  · Left ventricular systolic function is normal. Estimated left ventricular EF = 52%. · Left ventricular wall thickness is consistent with mild concentric hypertrophy. · Mild to moderate mitral valve regurgitation is present with an eccentric jet noted. · Mild to moderate tricuspid valve regurgitation is present. · Estimated right ventricular systolic pressure from tricuspid regurgitation is moderately elevated (45-55 mmHg). · Saline test results are negative for right to left atrial level shunt.      CT Head Without Contrast    Result Date: 11/29/2021  CT Head WO HISTORY: Follow-up stroke. Dysarthria. Increased right-sided weakness. TECHNIQUE: Axial unenhanced head CT with multiplanar reformats. Radiation dose reduction techniques included automated exposure control or exposure modulation based on body size. Count of known CT and cardiac nuc med studies performed in previous 12 months: 4. COMPARISON: 11/29/2021 at 0353 hours FINDINGS: Atrophy is noted. Ventriculomegaly is stable.  Chronic small vessel ischemic changes are present in the white matter. There is a chronic infarct in the left corona radiata. No acute infarct or hemorrhage is seen. There are no masses. Atherosclerotic calcifications are present in the carotid siphons. There is no skull fracture.     Impression: No acute findings and no significant interval change. Consider MRI for follow-up if indicated. Signer Name: Mark Solano MD  Signed: 11/29/2021 11:55 PM  Workstation Name:  [facility]  [facility]      Results for orders placed during the hospital encounter of 11/29/21    Adult Transthoracic Echo Complete W/ Cont if Necessary Per Protocol (With Agitated Saline)    Interpretation Summary  · Left ventricular systolic function is normal. Estimated left ventricular EF = 52%.  · Left ventricular wall thickness is consistent with mild concentric hypertrophy.  · Mild to moderate mitral valve regurgitation is present with an eccentric jet noted.  · Mild to moderate tricuspid valve regurgitation is present.  · Estimated right ventricular systolic pressure from tricuspid regurgitation is moderately elevated (45-55 mmHg).  · Saline test results are negative for right to left atrial level shunt.      I have reviewed the medications:  Scheduled Meds:albuterol sulfate HFA, 2 puff, Inhalation, 4x Daily - RT  apixaban, 5 mg, Oral, Q12H  aspirin, 81 mg, Oral, Daily  atorvastatin, 80 mg, Oral, Nightly  furosemide, 20 mg, Intravenous, Once  insulin lispro, 0-7 Units, Subcutaneous, TID AC  metoprolol tartrate, 25 mg, Oral, Q12H  miconazole, , Topical, Q12H  nystatin, , Topical, Q12H  pantoprazole, 40 mg, Oral, Q AM  sodium chloride, 10 mL, Intravenous, Q12H  sodium chloride, 10 mL, Intravenous, Q12H      Continuous Infusions:   PRN Meds:.•  acetaminophen **OR** acetaminophen **OR** acetaminophen  •  benzonatate  •  senna-docusate sodium **AND** polyethylene glycol **AND** bisacodyl **AND** bisacodyl  •  dextrose  •  dextrose  •  glucagon (human recombinant)  •  magnesium sulfate **OR** magnesium sulfate **OR** magnesium sulfate  •  ondansetron **OR** ondansetron  •  potassium chloride **OR** potassium chloride **OR** potassium chloride  •  sodium chloride  •  sodium chloride    Assessment/Plan   Assessment & Plan     Active Hospital Problems    Diagnosis  POA   • **Suspected subacute cerebrovascular accident (CVA) [R09.89]  Yes   • Elevated serum creatinine [R79.89]  Yes   •  Paroxysmal atrial fibrillation (HCC) [I48.0]  Yes   • COVID-19 virus detected [U07.1]  Yes   • Right sided weakness [R53.1]  Yes   • T2DM (type 2 diabetes mellitus) ([facility]) [E11.9]  Yes   • Essential hypertension [I10]  Yes   • Acute on chronic diastolic CHF (congestive heart failure) ([facility]) [I50.33]  Yes   • History of MRSA infection [Z86.14]  Yes      Resolved Hospital Problems   No resolved problems to display.        Brief Hospital Course to date:  Otilia Givens is a 71 y.o. female with hx of HTN, DM2, PAF, GERD, TIA, and chronic venous stasis with hx of MRSA leg wounds who presents from OSH due to right sided weakness and slurred speech. Also complained of a mild cough. Apparently her right sided weakness has been going on for 2 weeks. At the OSH, CT head was negative, NIH was 6. COVID-19 was positive. Transferred to [facility] for Neurology evaluation.     Right sided weakness and slurred speech  Probable subacute CVA  Hx of TIA  --CT head and CT perfusion negative. CTA head/neck showed moderate to high-grade short segment stenosis in the proximal right P2 segment, mild stenosis of proximal left posterior M2 branch.  --Has hx of recent diagnosis of Afib and has been taking Plavix.  --MRI brain ordered but patient states she is very claustrophobic and even with offering pre-medication, she is refusing to have it done.  --Echo no evidence of PFO.  --Continue ASA, high dose statin.  --Has been started on Eliquis.  --Neurology has signed off.  --PT/OT/SLP.      Elevated troponin  Acute on chronic diastolic CHF  Pulmonary HTN  --Recent admission at [facility] for CHF exacerbation? Data deficit.  --Echo here reviewed: EF 52%, grade I diastolic dysfunction, mild to moderate MR and TR, moderate pulmonary HTN with RVSP 45-55 mmHg.  --s/p 20 mg IV Lasix x 1.  --Repeat troponin with normal range.  --Cardiology has seen. Started Eliquis, Metoprolol for Afib (previously on Coreg at home).  --May need to resume home Lasix--need to watch  renal function closely first.   --Hold home Benazepril due to renal insufficiency, resume as tolerated.     COVID-19 positive  --Mild symptoms.  --Negative procal, normal lactate.  --CXR with mild patchy disease noted and cardiomegaly.  --On room air, only on 1 liter for comfort, does not qualify for any treatment at this time. Monitor closely and if begins to require oxygen may need to initiate treatment. Will follow labs.     Elevated Cr--Possible CKD?  --Cr 1.47 on admission, now up to 1.88, no prior labs for comparison--request PCP records today.  --Hold nephrotoxic meds and monitor closely.  --Check urine lytes and renal ultrasound.     PAF  --Recent diagnosis, apparently has been taking Plavix..  --Cardiology has seen and started on Eliquis and Metoprolol. Stopped Plavix.  --Echo results as above.  --Currently in NSR.     HTN  --Holding home Benazepril due to elevated Cr.  --BP elevated today, will resume home Hydralazine.  --Coreg switched to Metoprolol as above.     DM2  --HbA1 8.3%.  --On insulin at home.  --Low dose SSI for now.     Hx of recurrent MRSA wound infection BLE  --Follows with wound care, has dressing changes/wraps every 2 days.  --Recently completed antibiotic course ~1-2 weeks ago, cannot remember name of antibiotic.  --PT wound following here.     DVT prophylaxis:  Medical and mechanical DVT prophylaxis orders are present.       AM-PAC 6 Clicks Score (PT): 6 (12/01/21 9622)    Disposition: I expect the patient to be discharged to SNF, TBD--apparently can not take until 12/9/21 due to COVID positive    CODE STATUS:   Code Status and Medical Interventions:   Ordered at: 11/29/21 0329     Code Status (Patient has no pulse and is not breathing):    CPR (Attempt to Resuscitate)     Medical Interventions (Patient has pulse or is breathing):    Full Support       Guerline Conley MD  12/01/21

## 2021-12-02 NOTE — THERAPY WOUND CARE TREATMENT
Acute Care - Wound/Debridement Treatment Note  [facility]     Patient Name: Otilia Givens  : 1950  MRN: [id number]  Today's Date: 2021                Admit Date: 2021    Visit Dx:    ICD-10-CM ICD-9-CM   1. Dysarthria  R47.1 784.51   2. Dysphagia, unspecified type  R13.10 787.20   3. COVID-19 virus detected  U07.1 079.89     BLE      R posterior ankle      Patient Active Problem List   Diagnosis   • Paroxysmal atrial fibrillation (HCC)   • COVID-19 virus detected   • Right sided weakness   • T2DM (type 2 diabetes mellitus) ([facility])   • Essential hypertension   • Personal history of transient ischemic attack (TIA), and cerebral infarction without residual deficits   • Acute on chronic diastolic CHF (congestive heart failure) ([facility])   • History of MRSA infection   • Coronary artery disease involving native coronary artery of native heart   • Suspected subacute cerebrovascular accident (CVA)   • Elevated serum creatinine        Past Medical History:   Diagnosis Date   • Afib ([facility])    • CHF (congestive heart failure) ([facility])    • Diabetes ([facility])    • GERD (gastroesophageal reflux disease)    • Hypertension    • Stroke ([facility])         Past Surgical History:   Procedure Laterality Date   • HYSTERECTOMY     • TOE AMPUTATION Left            Wound 21 0317 medial pubis (Active)   Dressing Appearance open to air 21   Closure Open to air 21   Base red; dry 21   Periwound intact; moist; yeast; warm; pink 21   Periwound Temperature warm 21   Periwound Skin Turgor soft 21   Drainage Amount none 21   Care, Wound cleansed with 21   Dressing Care open to air 21 08   Periwound Care dry periwound area maintained 21       Wound 21 0319 Left anterior third toe Abrasion (Active)   Dressing Appearance open to air 21 0812   Closure Open to air 21 08   Base scab 21   Periwound dry; intact  12/02/21 0812   Periwound Temperature warm 12/02/21 0812   Periwound Skin Turgor soft 12/02/21 0812   Drainage Amount none 12/02/21 0812   Dressing Care open to air 12/02/21 0812       Wound 11/29/21 0319 Left anterior fifth toe Abrasion (Active)   Dressing Appearance open to air 12/02/21 0812   Closure Open to air 12/02/21 0812   Base clean; dry; scab 12/02/21 0812   Periwound dry; intact 12/02/21 0812   Periwound Temperature warm 12/02/21 0812   Periwound Skin Turgor soft 12/02/21 0812   Drainage Amount none 12/02/21 0812   Dressing Care open to air 12/02/21 0812   Periwound Care dry periwound area maintained 12/02/21 0812       Wound 11/29/21 0320 Left lateral groin MASD (Moisture associated skin damage) (Active)   Dressing Appearance open to air 12/02/21 0812   Closure Open to air 12/02/21 0812   Base clean; pink; moist 12/02/21 0812   Periwound intact; dry; pink 12/02/21 0812   Periwound Temperature warm 12/02/21 0812   Periwound Skin Turgor soft 12/02/21 0812   Drainage Amount none 12/02/21 0812   Care, Wound cleansed with 12/02/21 0812   Dressing Care open to air 12/02/21 0812   Periwound Care dry periwound area maintained 12/02/21 0812       Wound 12/02/21 1135 Right posterior leg Blisters (Active)   Wound Image   12/02/21 1135   Dressing Appearance open to air 12/02/21 1135   Base dry; yellow; slough; red 12/02/21 1135   Periwound intact; redness; pink; swelling 12/02/21 1135   Periwound Temperature warm 12/02/21 1135   Periwound Skin Turgor soft 12/02/21 1135   Edges irregular; open 12/02/21 1135   Wound Length (cm) 0.7 cm 12/02/21 1135   Wound Width (cm) 0.4 cm 12/02/21 1135   Wound Depth (cm) 0.2 cm 12/02/21 1135   Drainage Amount none 12/02/21 1135   Care, Wound cleansed with; soap and water; debrided 12/02/21 1135   Dressing Care dressing applied; gauze; foam; low-adherent 12/02/21 1135   Periwound Care cleansed with pH balanced cleanser; dry periwound area maintained 12/02/21 1135      Lymphedema      Row Name 12/02/21 1135             Subjective Pain    Able to rate subjective pain? yes  -              Lymphedema Edema Assessment    Ptting Edema Category By severity  -      Pitting Edema Mild  -              Skin Changes/Observations    Location/Assessment Lower Extremity  -      Lower Extremity Conditions bilateral:; dry; scaly; crust; fragile  -      Lower Extremity Color/Pigment bilateral:; erythema  -              Compression/Skin Care    Compression/Skin Care skin care; wrapping location  -      Skin Care washed/dried; lotion applied  -      Wrapping Location lower extremity  -      Wrapping Location LE bilateral:; foot to knee  -      Wrapping Comments BLE size 4/5 MLW applied doubled and overlapped for gradient compression.  -            User Key  (r) = Recorded By, (t) = Taken By, (c) = Cosigned By    Initials Name Provider Type     Derik Ponce, PT Physical Therapist                WOUND DEBRIDEMENT  Total area of Debridement: ~20cm2  Debridement Site 1  Location- Site 1: BLE  Selective Debridement- Site 1: Wound Surface <20cmsq  Instruments- Site 1: tweezers  Excised Tissue Description- Site 1: moderate, maximum, other (comment) (Dry, flaking hypertrophic crust.)  Bleeding- Site 1: scant               PT Assessment (last 12 hours)     PT Evaluation and Treatment     [facility] Name 12/02/21 1135          Physical Therapy Time and Intention    Subjective Information no complaints  -     Document Type therapy note (daily note); wound care  -     Mode of Treatment physical therapy; individual therapy  -[facility] Name 12/02/21 1135          Cognition    Affect/Mental Status (Cognitive) confused  -     Orientation Status (Cognition) oriented x 3  -[facility] Name 12/02/21 1135          Pain    Additional Documentation Pain Scale: FACES Pre/Post-Treatment (Group)  -[facility] Name 12/02/21 1135          Pain Scale: Numbers Pre/Post-Treatment    Pretreatment Pain Rating 0/10 - no  pain  -LH     Posttreatment Pain Rating 0/10 - no pain  -LH     Row Name             Wound 11/29/21 0317 medial pubis    Wound - Properties Group Placement Date: 11/29/21  -BM Placement Time: 0317  -BM Present on Hospital Admission: Y  -BM Orientation: medial  -BM Location: pubis  -BM     Retired Wound - Properties Group Date first assessed: 11/29/21  -BM Time first assessed: 0317  -BM Present on Hospital Admission: Y  -BM Location: pubis  -BM     Row Name             Wound 11/29/21 0319 Left anterior third toe Abrasion    Wound - Properties Group Placement Date: 11/29/21  -BM Placement Time: 0319  -BM Present on Hospital Admission: Y  -BM Side: Left  -BM Orientation: anterior  -BM Location: third toe  -BM Primary Wound Type: Abrasion  -BM     Retired Wound - Properties Group Date first assessed: 11/29/21  -BM Time first assessed: 0319  -BM Present on Hospital Admission: Y  -BM Side: Left  -BM Location: third toe  -BM Primary Wound Type: Abrasion  -BM     Row Name             Wound 11/29/21 0319 Left anterior fifth toe Abrasion    Wound - Properties Group Placement Date: 11/29/21  -BM Placement Time: 0319  -BM Present on Hospital Admission: Y  -BM Side: Left  -BM Orientation: anterior  -BM Location: fifth toe  -BM Primary Wound Type: Abrasion  -BM     Retired Wound - Properties Group Date first assessed: 11/29/21  -BM Time first assessed: 0319  -BM Present on Hospital Admission: Y  -BM Side: Left  -BM Location: fifth toe  -BM Primary Wound Type: Abrasion  -BM     Row Name             Wound 11/29/21 0320 Left lateral groin MASD (Moisture associated skin damage)    Wound - Properties Group Placement Date: 11/29/21  -BM Placement Time: 0320  -BM Present on Hospital Admission: Y  -BM Side: Left  -BM Orientation: lateral  -BM Location: groin  -BM Primary Wound Type: MASD  -BM     Retired Wound - Properties Group Date first assessed: 11/29/21  -BM Time first assessed: 0320  -BM Present on Hospital Admission: Y  -BM Side:  Left  -BM Location: groin  -BM Primary Wound Type: MASD  -BM     Row Name 12/02/21 1135          Wound 12/02/21 1135 Right posterior leg Blisters    Wound - Properties Group Placement Date: 12/02/21  - Placement Time: 1135  - Present on Hospital Admission: Y  - Side: Right  - Orientation: posterior  - Location: leg  - Primary Wound Type: Blisters  -     Wound Image  View All Images View Images  -     Dressing Appearance open to air  -     Base dry; yellow; slough; red  -     Periwound intact; redness; pink; swelling  -     Periwound Temperature warm  -     Periwound Skin Turgor soft  -     Edges irregular; open  -     Wound Length (cm) 0.7 cm  -     Wound Width (cm) 0.4 cm  -     Wound Depth (cm) 0.2 cm  -     Drainage Amount none  -     Care, Wound cleansed with; soap and water; debrided  -     Dressing Care dressing applied; gauze; foam; low-adherent  -     Periwound Care cleansed with pH balanced cleanser; dry periwound area maintained  -     Retired Wound - Properties Group Date first assessed: 12/02/21  - Time first assessed: 1135  - Present on Hospital Admission: Y  - Side: Right  -LH Location: leg  -LH Primary Wound Type: Blisters  -     Row Name 12/02/21 1135          Coping    Observed Emotional State calm; cooperative  -     Verbalized Emotional State acceptance  -     Trust Relationship/Rapport care explained; questions answered  [facility]     Row Name 12/02/21 1135          Plan of Care Review    Plan of Care Reviewed With patient  -     Progress improving  -     Outcome Summary Pt appears to have responded well to Unna boots, with decreased BLE edema and erythema this session. PT able to further debride moderat to maximal BLE dry, flaking hypertrophic crust. PT changed dressings to size 4/5 MLW this session to continue promoting edema management. PT plans to f/u with debridement as needed and dressing changes in 2-3 days.  -     Row Name 12/02/21  1135          Positioning and Restraints    Pre-Treatment Position sitting in chair/recliner  -     Post Treatment Position chair  -LH     In Chair reclined; call light within reach; encouraged to call for assist; legs elevated  -           User Key  (r) = Recorded By, (t) = Taken By, (c) = Cosigned By    Initials Name Provider Type    BM Kristi Brown, RN Registered Nurse    Derik Osborn, PT Physical Therapist              Physical Therapy Education                 Title: PT OT SLP Therapies (In Progress)     Topic: Physical Therapy (Done)     Point: Mobility training (Done)     Learning Progress Summary           Patient Acceptance, E, VU,NR by NS at 11/29/2021 1115                   Point: Home exercise program (Done)     Learning Progress Summary           Patient Acceptance, E, VU,NR by NS at 11/29/2021 1115                   Point: Body mechanics (Done)     Learning Progress Summary           Patient Acceptance, E, VU,NR by NS at 11/29/2021 1115                   Point: Precautions (Done)     Learning Progress Summary           Patient Acceptance, E, VU,NR by NS at 11/29/2021 1115                               User Key     Initials Effective Dates Name Provider Type Discipline    NS 06/16/21 -  Isela Hathaway, PAT Physical Therapist PT                Recommendation and Plan  Anticipated Discharge Disposition (PT): skilled nursing facility  Planned Therapy Interventions (PT): wound care  Therapy Frequency (PT): daily  Plan of Care Reviewed With: patient   Progress: improving       Progress: improving  Outcome Summary: Pt appears to have responded well to Unna boots, with decreased BLE edema and erythema this session. PT able to further debride moderat to maximal BLE dry, flaking hypertrophic crust. PT changed dressings to size 4/5 MLW this session to continue promoting edema management. PT plans to f/u with debridement as needed and dressing changes in 2-3 days.  Plan of Care Reviewed With:  patient            Time Calculation   PT Charges     Row Name 12/02/21 1313             Time Calculation    Start Time 1135  -LH      PT Goal Re-Cert Due Date 12/09/21  -              Untimed Charges    Wound Care 31191 Selective debridement; 94459 Multilayer comp below knee  -LH      93530-Syvongxtoq comp below knee 10  -LH      83116-Enjkonlmi debridement 15  -LH              Total Minutes    Untimed Charges Total Minutes 25  -LH       Total Minutes 25  -LH            User Key  (r) = Recorded By, (t) = Taken By, (c) = Cosigned By    Initials Name Provider Type     Derik Ponce, PT Physical Therapist                  Therapy Charges for Today     Code Description Service Date Service Provider Modifiers Qty    06774284205 HC BRITTANY DEBRIDE OPEN WOUND UP TO 20CM 12/2/2021 Derik Ponce, PT GP 1    01599404505 HC PT MULTI LAYER COMP SYS BELOW KNEE 12/2/2021 Derik Ponce, PT GP 1            PT G-Codes  Outcome Measure Options: AM-PAC 6 Clicks Daily Activity (OT), Modified [place]  AM-PAC 6 Clicks Score (PT): 6  AM-PAC 6 Clicks Score (OT): 9  Modified [place] Scale: 5 - Severe disability.  Bedridden, incontinent, and requiring constant nursing care and attention.       Derik Ponce PT  12/2/2021

## 2021-12-02 NOTE — CONSULTS
Diabetes Education    Patient Name:  Otilia Givens  YOB: 1950  MRN: [id number]  Admit Date:  11/29/2021        Following for diabetes education consult; noted plans per  note for pt to transfer to SNF at d/c. Attempted to contact pt's daughter by phone, no answer, no voicemail set up. Will continue to attempt. Thank you.      Electronically signed by:  Alie Walker RN, [facility]  12/02/21 16:31 EST

## 2021-12-02 NOTE — PROGRESS NOTES
[facility] Medicine Services  PROGRESS NOTE    Patient Name: Otilia Givens  : 1950  MRN: [id number]    Date of Admission: 2021  Primary Care Physician: Shannon Aly MD    Subjective   Subjective     CC:  F/U CVA    HPI:  Sitting up in bed. No overnight issues. Tired today. No pain, n/v reported. Denies soa    ROS:  Gen-no fevers, no chills  CV-no chest pain, no palpitations  Resp-no cough, no dyspnea  GI-no N/V/D, no abd pain    All other systems reviewed and negative except any additional pertinent positives and negatives as discussed in HPI.      Objective   Objective     Vital Signs:   Temp:  [97.7 °F (36.5 °C)-98.5 °F (36.9 °C)] 98 °F (36.7 °C)  Heart Rate:  [70-79] 72  Resp:  [16-19] 19  BP: (120-146)/(69-99) 139/86  Flow (L/min):  [1] 1     Physical Exam:    Constitutional: No acute distress, sleeping, arouses to voice  HENT: NCAT, mucous membranes moist  Respiratory: Clear to auscultation bilaterally, respiratory effort normal on 1LNC  Cardiovascular: RRR, no murmurs, rubs, or gallops  Gastrointestinal: Positive bowel sounds, soft, nontender, nondistended  Musculoskeletal: BLE leg wraps in place  Psychiatric: Appropriate affect, cooperative  Neurologic: Oriented x 3, strength symmetric in all extremities, Cranial Nerves grossly intact to confrontation, speech clear  Skin: No rashes      Results Reviewed:  LAB RESULTS:      Lab 21  0527 21  0923 21  0903 21  0338 21  0337   WBC 4.67 5.45 5.24 3.86  --    HEMOGLOBIN 12.7 12.4 11.5* 12.4  --    HEMATOCRIT 40.3 38.7 35.1 37.9  --    PLATELETS 162 161 166 184  --    NEUTROS ABS  --   --   --  2.45  --    IMMATURE GRANS (ABS)  --   --   --  0.01  --    LYMPHS ABS  --   --   --  0.73  --    MONOS ABS  --   --   --  0.54  --    EOS ABS  --   --   --  0.12  --    MCV 86.1 85.1 83.0 83.5  --    CRP  --  3.21*  --  2.11*  --    PROCALCITONIN  --   --   --  0.18  --    LACTATE  --   --   --  1.5  --     LDH  --   --   --  200  --    PROTIME  --   --   --   --  13.2   APTT  --   --   --   --  35.6   D DIMER QUANT  --   --   --   --  1.08*         Lab 12/02/21  0527 12/01/21 0923 11/30/21  0903 11/29/21 0338   SODIUM 137 133* 136 139   POTASSIUM 4.6 4.5 4.3 4.2   CHLORIDE 102 101 103 102   CO2 21.0* 23.0 23.0 26.0   ANION GAP 14.0 9.0 10.0 11.0   BUN 42* 38* 32* 31*   CREATININE 1.73* 1.88* 1.58* 1.47*   GLUCOSE 185* 182* 183* 236*   CALCIUM 8.2* 8.1* 7.9* 8.6   MAGNESIUM  --   --  2.3 1.9   HEMOGLOBIN A1C  --   --   --  8.30*         Lab 12/01/21 0923 11/29/21 0338   TOTAL PROTEIN 6.5 6.9   ALBUMIN 2.90* 3.00*   GLOBULIN 3.6 3.9   ALT (SGPT) 11 14   AST (SGOT) 21 20   BILIRUBIN 0.6 0.6   ALK PHOS 104 108         Lab 11/29/21  1513 11/29/21  0338 11/29/21 0337   PROBNP  --  7,820.0*  --    TROPONIN T 0.027 0.036*  --    PROTIME  --   --  13.2   INR  --   --  1.03         Lab 11/29/21 0338   CHOLESTEROL 135   LDL CHOL 81   HDL CHOL 34*   TRIGLYCERIDES 105         Lab 11/29/21 0338   FERRITIN 457.20*         Brief Urine Lab Results  (Last result in the past 365 days)      Color   Clarity   Blood   Leuk Est   Nitrite   Protein   CREAT   Urine HCG        12/01/21 1738             130.3               Microbiology Results Abnormal     Procedure Component Value - Date/Time    Blood Culture - Blood, Hand, Right [[id number]]  (Normal) Collected: 11/29/21 0536    Lab Status: Preliminary result Specimen: Blood from Hand, Right Updated: 12/02/21 0615     Blood Culture No growth at 3 days    Blood Culture - Blood, Arm, Right [[id number]]  (Normal) Collected: 11/29/21 0337    Lab Status: Preliminary result Specimen: Blood from Arm, Right Updated: 12/02/21 0400     Blood Culture No growth at 3 days    Urine Culture - Urine, Urine, Clean Catch [[id number]] Collected: 11/29/21 0419    Lab Status: Final result Specimen: Urine, Clean Catch Updated: 11/30/21 1503     Urine Culture 50,000 CFU/mL Mixed Joanne Isolated    Narrative:       Specimen contains mixed organisms of questionable pathogenicity which indicates contamination with commensal jacey.  Further identification is unlikely to provide clinically useful information.  Suggest recollection.          US Renal Bilateral    Result Date: 12/2/2021  EXAMINATION: US RENAL BILATERAL-  INDICATION: ROXY; R47.1-Dysarthria and anarthria; R13.10-Dysphagia, unspecified; U07.1-COVID-19  COMPARISON: NONE  FINDINGS: Sonographic grayscale and color Doppler evaluation of the kidneys demonstrates  Right kidney measures 8.9 cm in length, without apparent mass or hydronephrosis. Normal color Doppler flow.  Left kidney measures 11.8 cm in length without apparent mass or hydronephrosis. Normal color Doppler flow.  Urinary bladder not evaluated due to patient determination of the scan      Impression: Moderately atrophic right kidney, otherwise without evidence of hydronephrosis.  This report was finalized on 12/2/2021 9:09 AM by Roberto Ocampo.        Results for orders placed during the hospital encounter of 11/29/21    Adult Transthoracic Echo Complete W/ Cont if Necessary Per Protocol (With Agitated Saline)    Interpretation Summary  · Left ventricular systolic function is normal. Estimated left ventricular EF = 52%.  · Left ventricular wall thickness is consistent with mild concentric hypertrophy.  · Mild to moderate mitral valve regurgitation is present with an eccentric jet noted.  · Mild to moderate tricuspid valve regurgitation is present.  · Estimated right ventricular systolic pressure from tricuspid regurgitation is moderately elevated (45-55 mmHg).  · Saline test results are negative for right to left atrial level shunt.      I have reviewed the medications:  Scheduled Meds:albuterol sulfate HFA, 2 puff, Inhalation, 4x Daily - RT  apixaban, 5 mg, Oral, Q12H  aspirin, 81 mg, Oral, Daily  atorvastatin, 80 mg, Oral, Nightly  furosemide, 20 mg, Intravenous, Once  hydrALAZINE, 25 mg, Oral,  TID  insulin lispro, 0-7 Units, Subcutaneous, TID AC  metoprolol tartrate, 25 mg, Oral, Q12H  miconazole, , Topical, Q12H  nystatin, , Topical, Q12H  pantoprazole, 40 mg, Oral, Q AM  senna-docusate sodium, 2 tablet, Oral, BID  sodium chloride, 10 mL, Intravenous, Q12H  sodium chloride, 10 mL, Intravenous, Q12H      Continuous Infusions:   PRN Meds:.•  acetaminophen **OR** acetaminophen **OR** acetaminophen  •  benzonatate  •  senna-docusate sodium **AND** polyethylene glycol **AND** bisacodyl **AND** bisacodyl  •  dextrose  •  dextrose  •  glucagon (human recombinant)  •  magnesium sulfate **OR** magnesium sulfate **OR** magnesium sulfate  •  ondansetron **OR** ondansetron  •  potassium chloride **OR** potassium chloride **OR** potassium chloride  •  senna-docusate sodium **AND** [DISCONTINUED] polyethylene glycol **AND** [DISCONTINUED] bisacodyl **AND** [DISCONTINUED] bisacodyl  •  sodium chloride  •  sodium chloride    Assessment/Plan   Assessment & Plan     Active Hospital Problems    Diagnosis  POA   • **Suspected subacute cerebrovascular accident (CVA) [R09.89]  Yes   • Elevated serum creatinine [R79.89]  Yes   • Paroxysmal atrial fibrillation (HCC) [I48.0]  Yes   • COVID-19 virus detected [U07.1]  Yes   • Right sided weakness [R53.1]  Yes   • T2DM (type 2 diabetes mellitus) ([facility]) [E11.9]  Yes   • Essential hypertension [I10]  Yes   • Acute on chronic diastolic CHF (congestive heart failure) ([facility]) [I50.33]  Yes   • History of MRSA infection [Z86.14]  Yes      Resolved Hospital Problems   No resolved problems to display.        Brief Hospital Course to date:  Otilia Givens is a 71 y.o. female with hx of HTN, DM2, PAF, GERD, TIA, and chronic venous stasis with hx of MRSA leg wounds who presents from OSH due to right sided weakness and slurred speech. Also complained of a mild cough. Apparently her right sided weakness has been going on for 2 weeks. At the OSH, CT head was negative, NIH was 6. COVID-19 was positive.  Transferred to [facility] for Neurology evaluation.    This patient's problems and plans were partially entered by my partner and updated as appropriate by me 12/02/21.      Right sided weakness and slurred speech  Probable subacute CVA  Hx of TIA  --CT head and CT perfusion negative. CTA head/neck showed moderate to high-grade short segment stenosis in the proximal right P2 segment, mild stenosis of proximal left posterior M2 branch.  --Has hx of recent diagnosis of Afib and has been taking Plavix.  --MRI brain ordered but patient states she is very claustrophobic and even with offering pre-medication, she is refusing to have it done.  --Echo no evidence of PFO.  --Continue ASA, high dose statin.  --Has been started on Eliquis.  --Neurology has signed off.  --PT/OT/SLP.      Elevated troponin  Acute on chronic diastolic CHF  Pulmonary HTN  --Recent admission at [facility] for CHF exacerbation? Data deficit.  --Echo here reviewed: EF 52%, grade I diastolic dysfunction, mild to moderate MR and TR, moderate pulmonary HTN with RVSP 45-55 mmHg.  --s/p 20 mg IV Lasix x 1.  --Repeat troponin with normal range.  --Cardiology has seen. Started Eliquis, Metoprolol bid for Afib (previously on Coreg at home).  --May need to resume home Lasix--need to watch renal function closely first. Currently trending down  --Hold home Benazepril due to renal insufficiency, resume as tolerated. BP stable     COVID-19 positive  --Mild symptoms.  --Negative procal, normal lactate.  --CXR with mild patchy disease noted and cardiomegaly.  --On room air, only on 1 liter for comfort, does not qualify for any treatment at this time. Monitor closely and if begins to require oxygen may need to initiate treatment. Will follow labs.     Elevated Cr--Possible CKD?  --Cr 1.47 on admission, trending down today from 1.88 to 1.73 no prior labs for comparison, have requested pcp records  --Hold nephrotoxic meds and monitor closely.  --Check urine reviewed. Renal  ultrasound with moderately atrophic right kidney, otherwise unremarkable     PAF  --Recent diagnosis, has been taking Plavix..  --Cardiology has seen and started on Eliquis and Metoprolol. Stopped Plavix.  --Echo results as above.  --Currently in NSR.     HTN  --Holding home Benazepril due to elevated Cr.  --home hydralazine restarted, bp  --Coreg switched to Metoprolol as above.     DM2  --HbA1 8.3%.  --On insulin at home.  --Low dose SSI for now. No change     Hx of recurrent MRSA wound infection BLE  --Follows with wound care, has dressing changes/wraps every 2 days.  --Recently completed antibiotic course ~1-2 weeks ago, cannot remember name of antibiotic.  --PT wound following here.     DVT prophylaxis:  Medical and mechanical DVT prophylaxis orders are present.       AM-PAC 6 Clicks Score (PT): 6 (12/01/21 9346)    Disposition: I expect the patient to be discharged to SNF, TBD--apparently can not take until 12/9/21 due to COVID positive    CODE STATUS:   Code Status and Medical Interventions:   Ordered at: 11/29/21 0329     Code Status (Patient has no pulse and is not breathing):    CPR (Attempt to Resuscitate)     Medical Interventions (Patient has pulse or is breathing):    Full Support       Chantel Weathers, APRN  12/02/21

## 2021-12-02 NOTE — PLAN OF CARE
Goal Outcome Evaluation:  Plan of Care Reviewed With: patient        Progress: improving  Outcome Summary: Pt appears to have responded well to Unna boots, with decreased BLE edema and erythema this session. PT able to further debride moderat to maximal BLE dry, flaking hypertrophic crust. PT changed dressings to size 4/5 MLW this session to continue promoting edema management. PT plans to f/u with debridement as needed and dressing changes in 2-3 days.

## 2021-12-02 NOTE — CASE MANAGEMENT/SOCIAL WORK
Continued Stay Note   Cassie     Patient Name: Otilia Givens  MRN: [id number]  Today's Date: 12/2/2021    Admit Date: 11/29/2021     Discharge Plan     Row Name 12/02/21 1237       Plan    Plan pdate    Patient/Family in Agreement with Plan yes    Plan Comments Spoke with to Sandhya Sheffield who recommends calling [facility] who may take COVID patients.  [facility] admission who advises they have closed their COVID unit and would not be able to accept patient until 12/14.  Called [facility] again and advised Sandhya that since they were patients first choice for placement will complete referral with them and sent patient on 12/9 if they determine they will offer a bed.  Referral faxed to [phone].  Called patient daughter, Rema, to advise that referral has been faxed to [facility] and MD is aware will not be able to transport until 12/9.  Transportation scheduling has arranged transport with  ambulance on 12/9 at 0900 pending acceptance to facility.  ANTONIO farrell.    Final Discharge Disposition Code 03 - skilled nursing facility (SNF)               Discharge Codes    No documentation.               Expected Discharge Date and Time     Expected Discharge Date Expected Discharge Time    Dec 3, 2021             Elaina Madrid, AMAYA

## 2021-12-02 NOTE — THERAPY TREATMENT NOTE
Patient Name: Otilia Givens  : 1950    MRN: [id number]                              Today's Date: 2021       Admit Date: 2021    Visit Dx:     ICD-10-CM ICD-9-CM   1. Dysarthria  R47.1 784.51   2. Dysphagia, unspecified type  R13.10 787.20   3. COVID-19 virus detected  U07.1 079.89     Patient Active Problem List   Diagnosis   • Paroxysmal atrial fibrillation (HCC)   • COVID-19 virus detected   • Right sided weakness   • T2DM (type 2 diabetes mellitus) (HCC)   • Essential hypertension   • Personal history of transient ischemic attack (TIA), and cerebral infarction without residual deficits   • Acute on chronic diastolic CHF (congestive heart failure) (HCC)   • History of MRSA infection   • Coronary artery disease involving native coronary artery of native heart   • Suspected subacute cerebrovascular accident (CVA)   • Elevated serum creatinine     Past Medical History:   Diagnosis Date   • Afib (HCC)    • CHF (congestive heart failure) (HCC)    • Diabetes (HCC)    • GERD (gastroesophageal reflux disease)    • Hypertension    • Stroke (HCC)      Past Surgical History:   Procedure Laterality Date   • HYSTERECTOMY     • TOE AMPUTATION Left       General Information     Row Name 21 1049          Physical Therapy Time and Intention    Document Type therapy note (daily note)  -NS     Mode of Treatment individual therapy; physical therapy  -NS     Row Name 21 1049          General Information    Patient Profile Reviewed yes  -NS     Existing Precautions/Restrictions fall; other (see comments)  R sided weakness  -NS     Row Name 21 1049          Cognition    Orientation Status (Cognition) oriented x 3  -NS     Row Name 21 1049          Safety Issues, Functional Mobility    Safety Issues Affecting Function (Mobility) safety precaution awareness; safety precautions follow-through/compliance; sequencing abilities  -NS     Impairments Affecting Function (Mobility) balance; coordination;  endurance/activity tolerance; motor planning; motor control; strength; postural/trunk control; range of motion (ROM); visual/perceptual  -NS           User Key  (r) = Recorded By, (t) = Taken By, (c) = Cosigned By    Initials Name Provider Type    Isela Elizalde PT Physical Therapist               Mobility     Row Name 12/02/21 1049          Bed Mobility    Bed Mobility rolling left; rolling right  -NS     Rolling Left Blair (Bed Mobility) maximum assist (25% patient effort); 2 person assist; verbal cues  -NS     Rolling Right Blair (Bed Mobility) maximum assist (25% patient effort); 2 person assist; verbal cues  -NS     Assistive Device (Bed Mobility) bed rails; draw sheet; head of bed elevated  -NS     Comment (Bed Mobility) VCs for sequencing.  -NS     Row Name 12/02/21 1049          Transfers    Comment (Transfers) Transfer to lift via chair. Session focused on strengthening and trunk control in unsupported sitting.  -NS     Row Name 12/02/21 1049          Bed-Chair Transfer    Bed-Chair Blair (Transfers) dependent (less than 25% patient effort); 2 person assist  -NS     Assistive Device (Bed-Chair Transfers) lift device  -NS           User Key  (r) = Recorded By, (t) = Taken By, (c) = Cosigned By    Initials Name Provider Type    Isela Elizalde PT Physical Therapist               Obj/Interventions     Row Name 12/02/21 1049          Motor Skills    Motor Skills therapeutic exercise  -NS     Therapeutic Exercise hip; knee; ankle  -NS     Row Name 12/02/21 1049          Hip (Therapeutic Exercise)    Hip (Therapeutic Exercise) strengthening exercise; isometric exercises  -NS     Hip Isometrics (Therapeutic Exercise) bilateral; gluteal sets; 10 repetitions  -NS     Hip Strengthening (Therapeutic Exercise) bilateral; external rotation; internal rotation; heel slides; 10 repetitions  AAROM in RLE  -NS     Row Name 12/02/21 1049          Knee (Therapeutic Exercise)    Knee (Therapeutic  Exercise) isometric exercises; strengthening exercise  -NS     Knee Isometrics (Therapeutic Exercise) bilateral; quad sets; 10 repetitions  -NS     Knee Strengthening (Therapeutic Exercise) bilateral; LAQ (long arc quad); 10 repetitions  -NS     Row Name 12/02/21 1049          Ankle (Therapeutic Exercise)    Ankle (Therapeutic Exercise) AROM (active range of motion); PROM (passive range of motion)  -NS     Ankle AROM (Therapeutic Exercise) left; bilateral; dorsiflexion; 10 repetitions  -NS     Ankle PROM (Therapeutic Exercise) right; dorsiflexion; plantarflexion; 10 repetitions  -NS     Row Name 12/02/21 1049          Balance    Balance Assessment sitting static balance; sitting dynamic balance  -NS     Static Sitting Balance mild impairment; unsupported; sitting in chair  -NS     Dynamic Sitting Balance moderate impairment; unsupported; sitting in chair  -NS     Comment, Balance Pt fluctuated between anterior lean and posterior lean in unsupported sitting. She completed A/P and lateral weight shifting in unsupported sitting, as well as reaching outside her SOLA to facilitate trunk control.  -NS           User Key  (r) = Recorded By, (t) = Taken By, (c) = Cosigned By    Initials Name Provider Type    Isela Elizalde PT Physical Therapist               Goals/Plan    No documentation.                Clinical Impression     Row Name 12/02/21 1049          Pain Scale: Numbers Pre/Post-Treatment    Pretreatment Pain Rating 0/10 - no pain  -NS     Posttreatment Pain Rating 0/10 - no pain  -NS     Row Name 12/02/21 1049          Plan of Care Review    Plan of Care Reviewed With patient  -NS     Progress no change  -NS     Outcome Summary Patient continues to progress with PT, limited by weakness, decreased trunk control, and balance impairments. She tolerated dynamic sitting tasks in unsupported sitting to facilitate trunk control, as well as BLE ther ex for strengthening. Will continue to progress as appropriate.  -NS      Row Name 12/02/21 1049          Vital Signs    Pre Systolic BP Rehab --  VSS- RN cleared for PT treatment  -NS     Pre Patient Position Supine  -NS     Intra Patient Position Side Lying  -NS     Post Patient Position Sitting  -NS     Row Name 12/02/21 1049          Positioning and Restraints    Pre-Treatment Position in bed  -NS     Post Treatment Position chair  -NS     In Chair notified nsg; reclined; call light within reach; encouraged to call for assist; exit alarm on; with nsg; waffle cushion; on mechanical lift sling; RUE elevated; heels elevated  -NS           User Key  (r) = Recorded By, (t) = Taken By, (c) = Cosigned By    Initials Name Provider Type    Isela Elizalde PT Physical Therapist               Outcome Measures     Row Name 12/02/21 1049          How much help from another person do you currently need...    Turning from your back to your side while in flat bed without using bedrails? 2  -NS     Moving from lying on back to sitting on the side of a flat bed without bedrails? 2  -NS     Moving to and from a bed to a chair (including a wheelchair)? 1  -NS     Standing up from a chair using your arms (e.g., wheelchair, bedside chair)? 2  -NS     Climbing 3-5 steps with a railing? 1  -NS     To walk in hospital room? 1  -NS     AM-PAC 6 Clicks Score (PT) 9  -NS     Row Name 12/02/21 1049          Modified [place] Scale    Modified Morgan Scale 5 - Severe disability.  Bedridden, incontinent, and requiring constant nursing care and attention.  -NS     Row Name 12/02/21 1049          Functional Assessment    Outcome Measure Options AM-PAC 6 Clicks Basic Mobility (PT); Modified [place]  -NS           User Key  (r) = Recorded By, (t) = Taken By, (c) = Cosigned By    Initials Name Provider Type    Isela Elizalde PT Physical Therapist                             Physical Therapy Education                 Title: PT OT SLP Therapies (In Progress)     Topic: Physical Therapy (Done)     Point: Mobility  training (Done)     Learning Progress Summary           Patient Acceptance, E, VU,NR by NS at 12/2/2021 1315    Acceptance, E, VU,NR by NS at 11/29/2021 1115                   Point: Home exercise program (Done)     Learning Progress Summary           Patient Acceptance, E, VU,NR by NS at 12/2/2021 1315    Acceptance, E, VU,NR by NS at 11/29/2021 1115                   Point: Body mechanics (Done)     Learning Progress Summary           Patient Acceptance, E, VU,NR by NS at 12/2/2021 1315    Acceptance, E, VU,NR by NS at 11/29/2021 1115                   Point: Precautions (Done)     Learning Progress Summary           Patient Acceptance, E, VU,NR by NS at 12/2/2021 1315    Acceptance, E, VU,NR by NS at 11/29/2021 1115                               User Key     Initials Effective Dates Name Provider Type Discipline    NS 06/16/21 -  Isela Hathaway, PT Physical Therapist PT              PT Recommendation and Plan     Plan of Care Reviewed With: patient  Progress: no change  Outcome Summary: Patient continues to progress with PT, limited by weakness, decreased trunk control, and balance impairments. She tolerated dynamic sitting tasks in unsupported sitting to facilitate trunk control, as well as BLE ther ex for strengthening. Will continue to progress as appropriate.     Time Calculation:    PT Charges     Row Name 12/02/21 1313 12/02/21 1049          Time Calculation    Start Time 1135  -LH 1049  -NS     PT Received On -- 12/02/21  -NS     PT Goal Re-Cert Due Date 12/09/21  - 12/09/21  -NS            Timed Charges    67654 - PT Therapeutic Exercise Minutes -- 10  -NS     56195 - PT Therapeutic Activity Minutes -- 28  -NS            Untimed Charges    Wound Care 54787 Selective debridement; 46837 Multilayer comp below knee  -LH --     51270-Qrtfztcziv comp below knee 10  -LH --     35051-Jzuqpfgqh debridement 15  -LH --            Total Minutes    Timed Charges Total Minutes -- 38  -NS     Untimed Charges Total  Minutes 25  -LH --      Total Minutes 25  -LH 38  -NS           User Key  (r) = Recorded By, (t) = Taken By, (c) = Cosigned By    Initials Name Provider Type    Isela Elizalde, PT Physical Therapist     Derik Ponce, PT Physical Therapist              Therapy Charges for Today     Code Description Service Date Service Provider Modifiers Qty    58802003411  PT THERAPEUTIC ACT EA 15 MIN 12/2/2021 Isela Hathaway, PT GP 2    16100138455 HC PT THER PROC EA 15 MIN 12/2/2021 Isela Hathaway, PT GP 1          PT G-Codes  Outcome Measure Options: AM-PAC 6 Clicks Basic Mobility (PT), Modified [place]  AM-PAC 6 Clicks Score (PT): 9  AM-PAC 6 Clicks Score (OT): 9  Modified [place] Scale: 5 - Severe disability.  Bedridden, incontinent, and requiring constant nursing care and attention.    Isela Hathaway, PT  12/2/2021

## 2021-12-02 NOTE — PLAN OF CARE
Goal Outcome Evaluation:  Plan of Care Reviewed With: patient        Progress: no change  Outcome Summary: Patient continues to progress with PT, limited by weakness, decreased trunk control, and balance impairments. She tolerated dynamic sitting tasks in unsupported sitting to facilitate trunk control, as well as BLE ther ex for strengthening. Will continue to progress as appropriate.

## 2021-12-02 NOTE — PLAN OF CARE
Goal Outcome Evaluation:  Plan of Care Reviewed With: patient        Progress: no change  Outcome Summary: Pt VSS and on RA. Pt alert and oriented, up with assist x2 and mechanical lift. Pt pulled out IV this am, attempted to start new IV however unsuccessful, charge nurse notified. Pt up to chair with PT this afternoon. Pt inc. bladder, and no bm since 11/28, PRN and scheduled bowel medicaitons given. Pt c/o pain in left leg, PRN Tylenol given w/ relief. PT wound care with dressing change to ARAMIS lower extermities this afternoon. Per MD ok to DC every shift [facility]. Pt still with R sided weakness and slurred speech. Daughter updated this shift. Will continue to monitor.

## 2021-12-02 NOTE — DISCHARGE PLACEMENT REQUEST
"CHANDLER Lynn, RN  Case Management  [phone]      Otilia Taylor (71 y.o. Female)             Date of Birth Social Security Number Address Home Phone MRN    1950  [address] [place] [id number] [phone] [id number]    Taoism Marital Status             Worship        Admission Date Admission Type Admitting Provider Attending Provider Department, Room/Bed    21 Urgent Guerline Conley MD Reddy, Mayuri V, MD [facility] 6B, N634/1    Discharge Date Discharge Disposition Discharge Destination                         Attending Provider: Guerline Conley MD    Allergies: No Known Allergies    Isolation: Contact Air   Infection: COVID (confirmed) (21)   Code Status: CPR   Advance Care Planning Activity    Ht: 170 cm (66.93\")   Wt: 114 kg (250 lb 7.1 oz)    Admission Cmt: None   Principal Problem: Suspected subacute cerebrovascular accident (CVA) [R09.89]                 Active Insurance as of 2021     Primary Coverage     Payor Plan Insurance Group Employer/Plan Group    MEDICARE MEDICARE A & B      Payor Plan Address Payor Plan Phone Number Payor Plan Fax Number Effective Dates    [address] [phone]  2014 - None Entered    [place] [address]       Subscriber Name Subscriber Birth Date Member ID       OTILIA TAYLOR 1950 [id number]                 Emergency Contacts      (Rel.) Home Phone Work Phone Mobile Phone    SULY SARGENT (Daughter) [phone] -- --            Insurance Information                MEDICARE/MEDICARE A & B Phone: [phone]    Subscriber: Otilia Taylor Subscriber#: [id number]    Group#: -- Precert#: --             History & Physical      Tyler Amador MD at 21 [facility]              [facility] Medicine Services  HISTORY AND PHYSICAL    Patient Name: Otilia Taylor  : 1950  MRN: [id number]  Primary Care Physician: Provider, No Known  Date of admission: " "11/29/2021    Subjective   Subjective     Chief Complaint:  Right sided weakness    HPI:  Otilia Givens is a 71 y.o. female with PMH significant for A. fib, DM 2, GERD, HTN, TIA, MRSA leg wounds from venous stasis, who originally presented to [facility] secondary to right-sided weakness.  Per report, patient's daughter noticed that the patient was having right-sided weakness and the patient noted she could not move her right arm or leg at 1830 yesterday evening.  Per report, the daughter went back to the patient's house around 2100 and the patient then had slurred speech.  At midnight the patient's daughter took the patient to [place] [facility]. Pt's only complaint is slurred speech; notes that her right side has felt weaker for ~ 2 weeks. Pt also c/o a \"mild\" cough w/ scant sputum production. Denies dyspnea, fever/chills, chest pain, N/V/D, abdominal pain, dysuria, worsening edema, syncope.   Wound care changes BLE dressings q 2 days; pt reports that she is intermittently on antibiotics for BLE/recurrent MRSA infection. Most recently finished course of antibiotics ~ 1-2 weeks ago. Pt unable to recall antibiotic name.   Upon arrival to the ED OSH, CT head is reportedly negative.  Her initial NIH was 6.  Her COVID-19 was reported to be positive although patient reports no COVID-19 symptoms.  Decision was made to transfer to [facility] for higher level of care.  She will be admitted to hospital medicine for further evaluation.    COVID Details:        Symptoms: [x] NONE [] Fever []  Cough [] Shortness of breath [] Change in taste or smell  The patient qualifies to receive the vaccine, but they have not yet received it.    Review of Systems   Constitutional: Negative for activity change, appetite change, chills, diaphoresis, fatigue, fever and unexpected weight change.   HENT: Negative.  Negative for congestion, postnasal drip, rhinorrhea, sinus pressure, sinus pain, sneezing, sore throat and trouble " "swallowing.    Eyes: Negative.  Negative for visual disturbance.   Respiratory: Positive for cough. Negative for chest tightness, shortness of breath and wheezing.         \"Mild\" cough w/ scant sputum production.    Cardiovascular: Negative.  Negative for chest pain, palpitations and leg swelling.   Gastrointestinal: Negative.  Negative for abdominal distention, abdominal pain, constipation, diarrhea, nausea and vomiting.   Endocrine: Negative.    Genitourinary: Negative.  Negative for decreased urine volume, difficulty urinating, dysuria, frequency, hematuria and urgency.   Musculoskeletal: Negative for arthralgias, back pain, gait problem, myalgias, neck pain and neck stiffness.   Skin: Positive for wound. Negative for color change, pallor and rash.        Chronic BLE wounds w/ wrapping.    Allergic/Immunologic: Negative.  Negative for immunocompromised state.   Neurological: Positive for facial asymmetry, speech difficulty, weakness and numbness. Negative for dizziness, tremors, seizures, syncope, light-headedness and headaches.   Hematological: Negative.  Does not bruise/bleed easily.   Psychiatric/Behavioral: Negative.  Negative for confusion. The patient is not nervous/anxious.    All other systems reviewed and are negative.     Personal History     Past Medical History:   Diagnosis Date   • Afib (HCC)    • CHF (congestive heart failure) (HCC)    • Diabetes (HCC)    • GERD (gastroesophageal reflux disease)    • Hypertension    • Stroke (HCC)      Past Surgical History:   Procedure Laterality Date   • HYSTERECTOMY     • TOE AMPUTATION Left      Family History:  family history includes Hypertension in her mother; No Known Problems in her father. Otherwise pertinent FHx was reviewed and unremarkable.     Social History:  reports that she has never smoked. She has never used smokeless tobacco. She reports that she does not drink alcohol and does not use drugs.  Social History     Social History Narrative    Bed- " bound, uses wheelchair. Lives w/ her son.      Medications:       No Known Allergies    Objective   Objective     Vital Signs:   Temp:  [97.1 °F (36.2 °C)] 97.1 °F (36.2 °C)  Heart Rate:  [76-81] 76  Resp:  [18] 18  BP: (123-137)/(83-85) 123/83    Physical Exam     Constitutional: Awake, alert; chronically ill appearing   Eyes: PERRLA, sclerae anicteric, no conjunctival injection  HENT: NCAT, mucous membranes dry/pale   Neck: Supple, no thyromegaly, no lymphadenopathy, trachea midline  Respiratory: Clear to auscultation bilaterally, nonlabored respirations   Cardiovascular: RRR, no murmurs, rubs, or gallops, trace edema BLE   Gastrointestinal: Positive bowel sounds, soft, nontender, nondistended  Musculoskeletal: Normal ROM bilaterally   Psychiatric: Appropriate affect, cooperative  Neurologic: Oriented x 3, LUE and LLE w/ 5/5 strength, RUE and RLE w/ 4/5 strength, Cranial Nerves grossly intact to confrontation, mild dysarthria   Skin: No rashes; chronic changes noted to BLE- no open ulcerations or lesions; left foot w/ decreased pulses compared to right and left foot cool to touch     Result Review:  I have personally reviewed the results from the time of this admission to 11/29/21 2:56 AM EST and agree with these findings:  []  Laboratory  []  Microbiology  []  Radiology  []  EKG/Telemetry   []  Cardiology/Vascular   []  Pathology  []  Old records  []  Other:  Most notable findings include: COVID-19 positive, WBC 3.8, HBG 11.4    LAB RESULTS:                                Microbiology Results (last 10 days)     ** No results found for the last 240 hours. **        No radiology results from the last 24 hrs      Assessment/Plan   Assessment & Plan       Right sided weakness    Atrial fibrillation (HCC)    COVID-19 virus detected    T2DM (type 2 diabetes mellitus) (HCC)    HTN (hypertension)    Personal history of transient ischemic attack (TIA), and cerebral infarction without residual deficits    Stroke (cerebrum)  (HCC)    CHF (congestive heart failure) (HCC)    History of MRSA infection     71 y.o. female with PMH significant for A. fib, DM 2, GERD, HTN, TIA, MRSA leg wounds from venous stasis, who originally presented to [facility] secondary to right-sided weakness he was found to have concern for suspected CVA and therefore transferred to [facility] for higher level of care.  Of note, patient was also found to have COVID-19.    Right-sided weakness  -Evaluation per stroke navigator  -Initial NIH at OSH was 6  -Echo in the a.m.  -CTA head/neck, CT perfusion, CT head, MRI pending  -Neurology consult in a.m.  -High-dose statin  -ASA daily  -Patient reportedly on Plavix outpatient (for new onset A.Fib ~2-3 weeks ago??)  -CBC, CMP, lipid panel, hemoglobin A1c, proBNP, PT/INR, PTT, magnesium, lactic acid, procalcitonin, BC x2 pending    COVID-19  -Asymptomatic  -Repeat COVID-19  -Covid labs now and daily    Atrial fibrillation (diagnosed within past month??)  History TIA  -No anticoagulation, has been taking Plavix  -Rate controlled    Diabetes mellitus 2  -FSBG AC at bedtime  -SS insulin  -Hemoglobin A1c    Hx of recurrent MRSA wound infection- BLE   -follows w/ wound care; has dressing/wraps changed every 2 days  -completed antibiotic course ~1-2 weeks ago for recurrent infection   -WOC consult   -lactic acid and procal pending   -blood cx pending   -neurovascular checks/doppler pulse- LLE (left foot noted to be cooler on exam w/ diminished pulses)     Hypertension  CHF  -awaiting med rec completion; will hold antihypertensives for now and allow permissive HTN pending Neurology recommendations   -hx of CHF w/ recent hospitalization at [facility] for exacerbation; data deficient   -ECHO pending   -daily weights     DVT prophylaxis: Mechanical    CODE STATUS:    Code Status (Patient has no pulse and is not breathing): CPR (Attempt to Resuscitate)  Medical Interventions (Patient has pulse or is breathing): Full Support    This  note has been completed as part of a split-shared workflow.   Signature: Electronically signed by SUDHA Adame, 11/29/21, 3:45 AM EST.            Brief Attending Admission Attestation     I have seen and examined the patient, performing an independent face-to-face diagnostic evaluation with plan of care reviewed and developed with the advanced practice clinician (APC).    Old records reviewed and summarized in PM hx.    Brief Summary Statement:  71-year-old female presented from [facility] secondary to right-sided weakness along with slurred speech and facial droop noted by daughter around 6:30 PM, patient presented to ED later on at midnight.  Patient states that she has noted her right-sided weakness approximately 2 weeks ago-exact timeline could not be determined and was not a TPA candidate.  In addition to that patient's Covid test did come positive, does complain of mild dry cough, chronic dyspnea not on oxygen.  Not vaccinated.  Has a recent hospitalization secondary to CHF in mid October.  Also was found to be in paroxysmal A. fib.  Also on work-up was found to have elevated proBNP,-with no complaint of chest pain, O2 sats 95% on room air, chronic pedal edema, uses Lasix 40 mg daily.  Also had elevated troponin of 0.036,    Remainder of detailed HPI is as noted above and has been reviewed and/or edited by me for completeness.    PM HX:  Hyperlipidemia-Lipitor 40 mg daily  Hypertension-benazepril 20 mg every 12, Toprol XL 25 mg daily  GERD-Protonix 40 mg daily  CAD?/PVD -aspirin 81 mg daily, Plavix 75 mg daily  Mood disorder-Cymbalta 30 mg daily  DM 2-Lantus 60 units in the evening  CHF-Lasix 40 mg   ?  History of paroxysmal A. fib  CKD-with baseline current creatinine around 1.3  Hx diabetic ulcer-s/p amputation    Vitals:    11/29/21 0325   BP:  123/83   Pulse: 76   Resp: 18   Temp: 97.1 °F (36.2 °C)   SpO2: 100%       Attending Physical Exam:  RS-decreased air entry at the bases.  CVS-  s1s2 irregular, no murmur.  ABD-distended, obese, bowel sounds positive, nontender  EXT-1+ edema, along with 2 amputation on the left foot  NEURO-awake alert speech difficult to understand, right upper as well as lower extremity weakness.      LABS:  EKG-sinus rhythm at 69 bpm, poor R wave progression in V4-V6, T wave inversion in 1 and aVL similar to old EKG that was sent from earlier today.  CT head did not show any acute disease CT perfusion normal brain CT angio head and neck showed moderate to high-grade short segment stenosis in the proximal right P2 segment, mild stenosis of proximal left posterior M2 branch.  Please see the official report    Brief Assessment/Plan  [id number] recommendation from stroke team-currently on aspirin, Plavix will continue it.  Acute CHF on chronic diastolic dysfunction, chest x-ray still pending, will continue Lasix 20 mg IV daily  A. fib, CHADS2 VASC= 7, likely will benefit from long-term anticoagulation waiting for MRI brain before starting it, cardiology input.  Positive troponin in the setting of CKD without any complaint of chest pain does not appear to have new EKG changes-continue to monitor the trend, echo in a.m., cardiology consult.  Covid test positive at external ER-repeating over here, no major symptoms, O2 requirement minimal 95% on 2 L nasal cannula.  Continue to monitor, steroids not initiated currently.    See above for further detailed assessment and plan developed with APC which I have reviewed and/or edited for completeness.        Admission Status: I believe that this patient meets inpatient status secondary to acute CVA.        Electronically signed by Tyler Amador MD at 11/29/21 3651         Current Facility-Administered Medications   Medication Dose Route Frequency Provider Last Rate Last Admin   • acetaminophen (TYLENOL) tablet 650 mg  650 mg Oral Q4H PRN Mary Spicer APRN   650 mg at 11/29/21 0641    Or   • acetaminophen (TYLENOL) 160 MG/5ML  solution 650 mg  650 mg Oral Q4H PRN Mary Spicer APRN        Or   • acetaminophen (TYLENOL) suppository 650 mg  650 mg Rectal Q4H PRN Mary Spicer APRN       • albuterol sulfate HFA (PROVENTIL HFA;VENTOLIN HFA;PROAIR HFA) inhaler 2 puff  2 puff Inhalation 4x Daily - RT Hellen Huerta APRN   2 puff at 12/02/21 0811   • apixaban (ELIQUIS) tablet 5 mg  5 mg Oral Q12H Dylan Oropeza IV, MD   5 mg at 12/02/21 0813   • aspirin chewable tablet 81 mg  81 mg Oral Daily Angela Evans APRN   81 mg at 12/02/21 0813   • atorvastatin (LIPITOR) tablet 80 mg  80 mg Oral Nightly Angela Evans APRN   80 mg at 12/01/21 2021   • benzonatate (TESSALON) capsule 200 mg  200 mg Oral TID PRN Hellen Huerta APRN       • polyethylene glycol (MIRALAX) packet 17 g  17 g Oral Daily Chantel Weathers APRN   17 g at 12/02/21 1125    And   • sennosides-docusate (PERICOLACE) 8.6-50 MG per tablet 2 tablet  2 tablet Oral BID Chantel Weathers APRN        And   • bisacodyl (DULCOLAX) EC tablet 5 mg  5 mg Oral Daily PRN Chantel Weathers APRN        And   • bisacodyl (DULCOLAX) suppository 10 mg  10 mg Rectal Daily PRN Chatnel Weathers APRN       • dextrose (D50W) (25 g/50 mL) IV injection 25 g  25 g Intravenous Q15 Min PRN Mary Spicer APRN       • dextrose (GLUTOSE) oral gel 15 g  15 g Oral Q15 Min PRN Mary Spicer APRN       • furosemide (LASIX) injection 20 mg  20 mg Intravenous Once Guerline Conley MD       • glucagon (human recombinant) (GLUCAGEN DIAGNOSTIC) injection 1 mg  1 mg Subcutaneous Q15 Min PRN aMry Spicer APRN       • hydrALAZINE (APRESOLINE) tablet 25 mg  25 mg Oral TID Guerline Conley MD   25 mg at 12/02/21 0812   • insulin lispro (humaLOG) injection 0-7 Units  0-7 Units Subcutaneous TID Guerline Brooks MD   2 Units at 12/02/21 1125   • Magnesium Sulfate 2 gram Bolus, followed by 8 gram infusion (total Mg dose 10 grams)- Mg less than or equal to 1mg/dL  2 g Intravenous  PRN Guerline Conley MD        Or   • Magnesium Sulfate 2 gram / 50mL Infusion (GIVE X 3 BAGS TO EQUAL 6GM TOTAL DOSE) - Mg 1.1 - 1.5 mg/dl  2 g Intravenous PRN Guerline Conley MD        Or   • Magnesium Sulfate 4 gram infusion- Mg 1.6-1.9 mg/dL  4 g Intravenous PRN Guerline Conley MD 25 mL/hr at 11/29/21 1052 4 g at 11/29/21 1052   • metoprolol tartrate (LOPRESSOR) tablet 25 mg  25 mg Oral Q12H Helga Ge PA-C   25 mg at 12/02/21 0813   • miconazole (MICOTIN) 2 % powder   Topical Q12H Guerline Conley MD   Given at 12/02/21 0813   • nystatin (MYCOSTATIN) powder   Topical Q12H Guerline Conley MD   Given at 12/02/21 0813   • ondansetron (ZOFRAN) tablet 4 mg  4 mg Oral Q6H PRN Guerline Conley MD        Or   • ondansetron (ZOFRAN) injection 4 mg  4 mg Intravenous Q6H PRN Guerline Conley MD   4 mg at 11/30/21 0914   • pantoprazole (PROTONIX) EC tablet 40 mg  40 mg Oral Q AM Guerline Conley MD   40 mg at 12/02/21 0625   • potassium chloride (MICRO-K) CR capsule 40 mEq  40 mEq Oral PRN Guerline Conley MD        Or   • potassium chloride (KLOR-CON) packet 40 mEq  40 mEq Oral PRN Guerline Conley MD        Or   • potassium chloride 10 mEq in 100 mL IVPB  10 mEq Intravenous Q1H PRN Guerline Conley MD       • sennosides-docusate (PERICOLACE) 8.6-50 MG per tablet 2 tablet  2 tablet Oral BID PRN Mary Spicer APRN       • sodium chloride 0.9 % flush 10 mL  10 mL Intravenous Q12H Angela Evans APRN   10 mL at 11/30/21 2015   • sodium chloride 0.9 % flush 10 mL  10 mL Intravenous PRN Angela Evans APRN       • sodium chloride 0.9 % flush 10 mL  10 mL Intravenous Q12H Mary Spicer APRN   10 mL at 12/01/21 2021   • sodium chloride 0.9 % flush 10 mL  10 mL Intravenous PRN Mary Spicer APRN            Physician Progress Notes (last 24 hours)      Chantel Weathers APRN at 12/02/21 0958              [facility] Medicine Services  PROGRESS NOTE    Patient Name:  Otilia Givens  : 1950  MRN: [id number]    Date of Admission: 2021  Primary Care Physician: Shannon Aly MD    Subjective   Subjective     CC:  F/U CVA    HPI:  Sitting up in bed. No overnight issues. Tired today. No pain, n/v reported. Denies soa    ROS:  Gen-no fevers, no chills  CV-no chest pain, no palpitations  Resp-no cough, no dyspnea  GI-no N/V/D, no abd pain    All other systems reviewed and negative except any additional pertinent positives and negatives as discussed in HPI.      Objective   Objective     Vital Signs:   Temp:  [97.7 °F (36.5 °C)-98.5 °F (36.9 °C)] 98 °F (36.7 °C)  Heart Rate:  [70-79] 72  Resp:  [16-19] 19  BP: (120-146)/(69-99) 139/86  Flow (L/min):  [1] 1     Physical Exam:    Constitutional: No acute distress, sleeping, arouses to voice  HENT: NCAT, mucous membranes moist  Respiratory: Clear to auscultation bilaterally, respiratory effort normal on 1LNC  Cardiovascular: RRR, no murmurs, rubs, or gallops  Gastrointestinal: Positive bowel sounds, soft, nontender, nondistended  Musculoskeletal: BLE leg wraps in place  Psychiatric: Appropriate affect, cooperative  Neurologic: Oriented x 3, strength symmetric in all extremities, Cranial Nerves grossly intact to confrontation, speech clear  Skin: No rashes      Results Reviewed:  LAB RESULTS:      Lab 21  0527 21  0923 21  0903 21  0338 21  0337   WBC 4.67 5.45 5.24 3.86  --    HEMOGLOBIN 12.7 12.4 11.5* 12.4  --    HEMATOCRIT 40.3 38.7 35.1 37.9  --    PLATELETS 162 161 166 184  --    NEUTROS ABS  --   --   --  2.45  --    IMMATURE GRANS (ABS)  --   --   --  0.01  --    LYMPHS ABS  --   --   --  0.73  --    MONOS ABS  --   --   --  0.54  --    EOS ABS  --   --   --  0.12  --    MCV 86.1 85.1 83.0 83.5  --    CRP  --  3.21*  --  2.11*  --    PROCALCITONIN  --   --   --  0.18  --    LACTATE  --   --   --  1.5  --    LDH  --   --   --  200  --    PROTIME  --   --   --   --  13.2   APTT  --   --   --    --  35.6   D DIMER QUANT  --   --   --   --  1.08*         Lab 12/02/21  0527 12/01/21  0923 11/30/21  0903 11/29/21  0338   SODIUM 137 133* 136 139   POTASSIUM 4.6 4.5 4.3 4.2   CHLORIDE 102 101 103 102   CO2 21.0* 23.0 23.0 26.0   ANION GAP 14.0 9.0 10.0 11.0   BUN 42* 38* 32* 31*   CREATININE 1.73* 1.88* 1.58* 1.47*   GLUCOSE 185* 182* 183* 236*   CALCIUM 8.2* 8.1* 7.9* 8.6   MAGNESIUM  --   --  2.3 1.9   HEMOGLOBIN A1C  --   --   --  8.30*         Lab 12/01/21  0923 11/29/21 0338   TOTAL PROTEIN 6.5 6.9   ALBUMIN 2.90* 3.00*   GLOBULIN 3.6 3.9   ALT (SGPT) 11 14   AST (SGOT) 21 20   BILIRUBIN 0.6 0.6   ALK PHOS 104 108         Lab 11/29/21  1513 11/29/21  0338 11/29/21 0337   PROBNP  --  7,820.0*  --    TROPONIN T 0.027 0.036*  --    PROTIME  --   --  13.2   INR  --   --  1.03         Lab 11/29/21  0338   CHOLESTEROL 135   LDL CHOL 81   HDL CHOL 34*   TRIGLYCERIDES 105         Lab 11/29/21  0338   FERRITIN 457.20*         Brief Urine Lab Results  (Last result in the past 365 days)      Color   Clarity   Blood   Leuk Est   Nitrite   Protein   CREAT   Urine HCG        12/01/21 1738             130.3               Microbiology Results Abnormal     Procedure Component Value - Date/Time    Blood Culture - Blood, Hand, Right [[id number]]  (Normal) Collected: 11/29/21 0536    Lab Status: Preliminary result Specimen: Blood from Hand, Right Updated: 12/02/21 0615     Blood Culture No growth at 3 days    Blood Culture - Blood, Arm, Right [[id number]]  (Normal) Collected: 11/29/21 0337    Lab Status: Preliminary result Specimen: Blood from Arm, Right Updated: 12/02/21 0400     Blood Culture No growth at 3 days    Urine Culture - Urine, Urine, Clean Catch [[id number]] Collected: 11/29/21 1370    Lab Status: Final result Specimen: Urine, Clean Catch Updated: 11/30/21 1503     Urine Culture 50,000 CFU/mL Mixed Joanne Isolated    Narrative:      Specimen contains mixed organisms of questionable pathogenicity which indicates  contamination with commensal jacey.  Further identification is unlikely to provide clinically useful information.  Suggest recollection.          US Renal Bilateral    Result Date: 12/2/2021  EXAMINATION: US RENAL BILATERAL-  INDICATION: ROXY; R47.1-Dysarthria and anarthria; R13.10-Dysphagia, unspecified; U07.1-COVID-19  COMPARISON: NONE  FINDINGS: Sonographic grayscale and color Doppler evaluation of the kidneys demonstrates  Right kidney measures 8.9 cm in length, without apparent mass or hydronephrosis. Normal color Doppler flow.  Left kidney measures 11.8 cm in length without apparent mass or hydronephrosis. Normal color Doppler flow.  Urinary bladder not evaluated due to patient determination of the scan      Impression: Moderately atrophic right kidney, otherwise without evidence of hydronephrosis.  This report was finalized on 12/2/2021 9:09 AM by Roberto Ocampo.        Results for orders placed during the hospital encounter of 11/29/21    Adult Transthoracic Echo Complete W/ Cont if Necessary Per Protocol (With Agitated Saline)    Interpretation Summary  · Left ventricular systolic function is normal. Estimated left ventricular EF = 52%.  · Left ventricular wall thickness is consistent with mild concentric hypertrophy.  · Mild to moderate mitral valve regurgitation is present with an eccentric jet noted.  · Mild to moderate tricuspid valve regurgitation is present.  · Estimated right ventricular systolic pressure from tricuspid regurgitation is moderately elevated (45-55 mmHg).  · Saline test results are negative for right to left atrial level shunt.      I have reviewed the medications:  Scheduled Meds:albuterol sulfate HFA, 2 puff, Inhalation, 4x Daily - RT  apixaban, 5 mg, Oral, Q12H  aspirin, 81 mg, Oral, Daily  atorvastatin, 80 mg, Oral, Nightly  furosemide, 20 mg, Intravenous, Once  hydrALAZINE, 25 mg, Oral, TID  insulin lispro, 0-7 Units, Subcutaneous, TID AC  metoprolol tartrate, 25 mg, Oral,  Q12H  miconazole, , Topical, Q12H  nystatin, , Topical, Q12H  pantoprazole, 40 mg, Oral, Q AM  senna-docusate sodium, 2 tablet, Oral, BID  sodium chloride, 10 mL, Intravenous, Q12H  sodium chloride, 10 mL, Intravenous, Q12H      Continuous Infusions:   PRN Meds:.•  acetaminophen **OR** acetaminophen **OR** acetaminophen  •  benzonatate  •  senna-docusate sodium **AND** polyethylene glycol **AND** bisacodyl **AND** bisacodyl  •  dextrose  •  dextrose  •  glucagon (human recombinant)  •  magnesium sulfate **OR** magnesium sulfate **OR** magnesium sulfate  •  ondansetron **OR** ondansetron  •  potassium chloride **OR** potassium chloride **OR** potassium chloride  •  senna-docusate sodium **AND** [DISCONTINUED] polyethylene glycol **AND** [DISCONTINUED] bisacodyl **AND** [DISCONTINUED] bisacodyl  •  sodium chloride  •  sodium chloride    Assessment/Plan   Assessment & Plan     Active Hospital Problems    Diagnosis  POA   • **Suspected subacute cerebrovascular accident (CVA) [R09.89]  Yes   • Elevated serum creatinine [R79.89]  Yes   • Paroxysmal atrial fibrillation (HCC) [I48.0]  Yes   • COVID-19 virus detected [U07.1]  Yes   • Right sided weakness [R53.1]  Yes   • T2DM (type 2 diabetes mellitus) ([facility]) [E11.9]  Yes   • Essential hypertension [I10]  Yes   • Acute on chronic diastolic CHF (congestive heart failure) ([facility]) [I50.33]  Yes   • History of MRSA infection [Z86.14]  Yes      Resolved Hospital Problems   No resolved problems to display.        Brief Hospital Course to date:  Otilia Givens is a 71 y.o. female with hx of HTN, DM2, PAF, GERD, TIA, and chronic venous stasis with hx of MRSA leg wounds who presents from OSH due to right sided weakness and slurred speech. Also complained of a mild cough. Apparently her right sided weakness has been going on for 2 weeks. At the OSH, CT head was negative, NIH was 6. COVID-19 was positive. Transferred to [facility] for Neurology evaluation.    This patient's problems and plans were  partially entered by my partner and updated as appropriate by me 12/02/21.      Right sided weakness and slurred speech  Probable subacute CVA  Hx of TIA  --CT head and CT perfusion negative. CTA head/neck showed moderate to high-grade short segment stenosis in the proximal right P2 segment, mild stenosis of proximal left posterior M2 branch.  --Has hx of recent diagnosis of Afib and has been taking Plavix.  --MRI brain ordered but patient states she is very claustrophobic and even with offering pre-medication, she is refusing to have it done.  --Echo no evidence of PFO.  --Continue ASA, high dose statin.  --Has been started on Eliquis.  --Neurology has signed off.  --PT/OT/SLP.      Elevated troponin  Acute on chronic diastolic CHF  Pulmonary HTN  --Recent admission at [facility] for CHF exacerbation? Data deficit.  --Echo here reviewed: EF 52%, grade I diastolic dysfunction, mild to moderate MR and TR, moderate pulmonary HTN with RVSP 45-55 mmHg.  --s/p 20 mg IV Lasix x 1.  --Repeat troponin with normal range.  --Cardiology has seen. Started Eliquis, Metoprolol bid for Afib (previously on Coreg at home).  --May need to resume home Lasix--need to watch renal function closely first. Currently trending down  --Hold home Benazepril due to renal insufficiency, resume as tolerated. BP stable     COVID-19 positive  --Mild symptoms.  --Negative procal, normal lactate.  --CXR with mild patchy disease noted and cardiomegaly.  --On room air, only on 1 liter for comfort, does not qualify for any treatment at this time. Monitor closely and if begins to require oxygen may need to initiate treatment. Will follow labs.     Elevated Cr--Possible CKD?  --Cr 1.47 on admission, trending down today from 1.88 to 1.73 no prior labs for comparison, have requested pcp records  --Hold nephrotoxic meds and monitor closely.  --Check urine reviewed. Renal ultrasound with moderately atrophic right kidney, otherwise  unremarkable     PAF  --Recent diagnosis, has been taking Plavix..  --Cardiology has seen and started on Eliquis and Metoprolol. Stopped Plavix.  --Echo results as above.  --Currently in NSR.     HTN  --Holding home Benazepril due to elevated Cr.  --home hydralazine restarted, bp  --Coreg switched to Metoprolol as above.     DM2  --HbA1 8.3%.  --On insulin at home.  --Low dose SSI for now. No change     Hx of recurrent MRSA wound infection BLE  --Follows with wound care, has dressing changes/wraps every 2 days.  --Recently completed antibiotic course ~1-2 weeks ago, cannot remember name of antibiotic.  --PT wound following here.     DVT prophylaxis:  Medical and mechanical DVT prophylaxis orders are present.       AM-PAC 6 Clicks Score (PT): 6 (21 0929)    Disposition: I expect the patient to be discharged to SNF, TBD--apparently can not take until 21 due to COVID positive    CODE STATUS:   Code Status and Medical Interventions:   Ordered at: 21 0329     Code Status (Patient has no pulse and is not breathing):    CPR (Attempt to Resuscitate)     Medical Interventions (Patient has pulse or is breathing):    Full Support       SUDHA Adams  21                Electronically signed by Chantel Weathers APRN at 21 1006     Guerline Conley MD at 21 1304              [facility] Medicine Services  PROGRESS NOTE    Patient Name: Otilia Givens  : 1950  MRN: [id number]    Date of Admission: 2021  Primary Care Physician: Shannon Aly MD    Subjective   Subjective     CC:  F/U CVA    HPI:  No major complaints this morning. Sitting up in chair. Ate some breakfast. Nausea better compared to yesterday.    ROS:  Gen-no fevers, no chills  CV-no chest pain, no palpitations  Resp-no cough, no dyspnea  GI-no N/V/D, no abd pain    All other systems reviewed and negative except any additional pertinent positives and negatives as discussed in  HPI.      Objective   Objective     Vital Signs:   Temp:  [97.7 °F (36.5 °C)-98.4 °F (36.9 °C)] 97.7 °F (36.5 °C)  Heart Rate:  [71-89] 75  Resp:  [16-19] 16  BP: (127-146)/(75-93) 146/87  Flow (L/min):  [1-2] 1     Physical Exam:  With patient's consent, physical exam was conducted via visual telemedicine encounter due to patient's current isolation requirements in the interest of PPE conservation.    Constitutional: No acute distress, awake, alert, nontoxic, normal body habitus, sitting up in chair  HENT: NCAT, MMM, no conjunctival injection  Respiratory: Good effort, nonlabored respirations on 1 liter for comfort  Cardiovascular:  tele with NSR  Musculoskeletal: trace BLE edema, legs currently wrapped  Psychiatric: Appropriate affect, good insight and judgement, cooperative  Neurologic: Oriented x 3, right sided weakness and right facial droop present, but speech seems much more clear today  Skin: No visible rashes, no jaundice seen on exposed skin through window        Results Reviewed:  LAB RESULTS:      Lab 12/01/21 0923 11/30/21 0903 11/29/21 0338 11/29/21 0337   WBC 5.45 5.24 3.86  --    HEMOGLOBIN 12.4 11.5* 12.4  --    HEMATOCRIT 38.7 35.1 37.9  --    PLATELETS 161 166 184  --    NEUTROS ABS  --   --  2.45  --    IMMATURE GRANS (ABS)  --   --  0.01  --    LYMPHS ABS  --   --  0.73  --    MONOS ABS  --   --  0.54  --    EOS ABS  --   --  0.12  --    MCV 85.1 83.0 83.5  --    CRP 3.21*  --  2.11*  --    PROCALCITONIN  --   --  0.18  --    LACTATE  --   --  1.5  --    LDH  --   --  200  --    PROTIME  --   --   --  13.2   APTT  --   --   --  35.6   D DIMER QUANT  --   --   --  1.08*         Lab 12/01/21 0923 11/30/21 0903 11/29/21 0338   SODIUM 133* 136 139   POTASSIUM 4.5 4.3 4.2   CHLORIDE 101 103 102   CO2 23.0 23.0 26.0   ANION GAP 9.0 10.0 11.0   BUN 38* 32* 31*   CREATININE 1.88* 1.58* 1.47*   GLUCOSE 182* 183* 236*   CALCIUM 8.1* 7.9* 8.6   MAGNESIUM  --  2.3 1.9   HEMOGLOBIN A1C  --   --  8.30*          Lab 12/01/21  0923 11/29/21  0338   TOTAL PROTEIN 6.5 6.9   ALBUMIN 2.90* 3.00*   GLOBULIN 3.6 3.9   ALT (SGPT) 11 14   AST (SGOT) 21 20   BILIRUBIN 0.6 0.6   ALK PHOS 104 108         Lab 11/29/21  1513 11/29/21  0338 11/29/21 0337   PROBNP  --  7,820.0*  --    TROPONIN T 0.027 0.036*  --    PROTIME  --   --  13.2   INR  --   --  1.03         Lab 11/29/21 0338   CHOLESTEROL 135   LDL CHOL 81   HDL CHOL 34*   TRIGLYCERIDES 105         Lab 11/29/21 0338   FERRITIN 457.20*         Brief Urine Lab Results  (Last result in the past 365 days)      Color   Clarity   Blood   Leuk Est   Nitrite   Protein   CREAT   Urine HCG        11/29/21 0419 Yellow   Clear   Trace   Negative   Negative   >=300 mg/dL (3+)                 Microbiology Results Abnormal     Procedure Component Value - Date/Time    Blood Culture - Blood, Hand, Right [[id number]]  (Normal) Collected: 11/29/21 0536    Lab Status: Preliminary result Specimen: Blood from Hand, Right Updated: 12/01/21 0615     Blood Culture No growth at 2 days    Blood Culture - Blood, Arm, Right [[id number]]  (Normal) Collected: 11/29/21 0337    Lab Status: Preliminary result Specimen: Blood from Arm, Right Updated: 12/01/21 0400     Blood Culture No growth at 2 days    Urine Culture - Urine, Urine, Clean Catch [[id number]] Collected: 11/29/21 0419    Lab Status: Final result Specimen: Urine, Clean Catch Updated: 11/30/21 1503     Urine Culture 50,000 CFU/mL Mixed Jacey Isolated    Narrative:      Specimen contains mixed organisms of questionable pathogenicity which indicates contamination with commensal jacey.  Further identification is unlikely to provide clinically useful information.  Suggest recollection.          Adult Transthoracic Echo Complete W/ Cont if Necessary Per Protocol (With Agitated Saline)    Result Date: 11/29/2021  · Left ventricular systolic function is normal. Estimated left ventricular EF = 52%. · Left ventricular wall thickness is consistent with  mild concentric hypertrophy. · Mild to moderate mitral valve regurgitation is present with an eccentric jet noted. · Mild to moderate tricuspid valve regurgitation is present. · Estimated right ventricular systolic pressure from tricuspid regurgitation is moderately elevated (45-55 mmHg). · Saline test results are negative for right to left atrial level shunt.      CT Head Without Contrast    Result Date: 11/29/2021  CT Head WO HISTORY: Follow-up stroke. Dysarthria. Increased right-sided weakness. TECHNIQUE: Axial unenhanced head CT with multiplanar reformats. Radiation dose reduction techniques included automated exposure control or exposure modulation based on body size. Count of known CT and cardiac nuc med studies performed in previous 12 months: 4. COMPARISON: 11/29/2021 at 0353 hours FINDINGS: Atrophy is noted. Ventriculomegaly is stable.  Chronic small vessel ischemic changes are present in the white matter. There is a chronic infarct in the left corona radiata. No acute infarct or hemorrhage is seen. There are no masses. Atherosclerotic calcifications are present in the carotid siphons. There is no skull fracture.     Impression: No acute findings and no significant interval change. Consider MRI for follow-up if indicated. Signer Name: Mark Solano MD  Signed: 11/29/2021 11:55 PM  Workstation Name: [facility]  [facility]      Results for orders placed during the hospital encounter of 11/29/21    Adult Transthoracic Echo Complete W/ Cont if Necessary Per Protocol (With Agitated Saline)    Interpretation Summary  · Left ventricular systolic function is normal. Estimated left ventricular EF = 52%.  · Left ventricular wall thickness is consistent with mild concentric hypertrophy.  · Mild to moderate mitral valve regurgitation is present with an eccentric jet noted.  · Mild to moderate tricuspid valve regurgitation is present.  · Estimated right ventricular systolic pressure from  tricuspid regurgitation is moderately elevated (45-55 mmHg).  · Saline test results are negative for right to left atrial level shunt.      I have reviewed the medications:  Scheduled Meds:albuterol sulfate HFA, 2 puff, Inhalation, 4x Daily - RT  apixaban, 5 mg, Oral, Q12H  aspirin, 81 mg, Oral, Daily  atorvastatin, 80 mg, Oral, Nightly  furosemide, 20 mg, Intravenous, Once  insulin lispro, 0-7 Units, Subcutaneous, TID AC  metoprolol tartrate, 25 mg, Oral, Q12H  miconazole, , Topical, Q12H  nystatin, , Topical, Q12H  pantoprazole, 40 mg, Oral, Q AM  sodium chloride, 10 mL, Intravenous, Q12H  sodium chloride, 10 mL, Intravenous, Q12H      Continuous Infusions:   PRN Meds:.•  acetaminophen **OR** acetaminophen **OR** acetaminophen  •  benzonatate  •  senna-docusate sodium **AND** polyethylene glycol **AND** bisacodyl **AND** bisacodyl  •  dextrose  •  dextrose  •  glucagon (human recombinant)  •  magnesium sulfate **OR** magnesium sulfate **OR** magnesium sulfate  •  ondansetron **OR** ondansetron  •  potassium chloride **OR** potassium chloride **OR** potassium chloride  •  sodium chloride  •  sodium chloride    Assessment/Plan   Assessment & Plan     Active Hospital Problems    Diagnosis  POA   • **Suspected subacute cerebrovascular accident (CVA) [R09.89]  Yes   • Elevated serum creatinine [R79.89]  Yes   • Paroxysmal atrial fibrillation (HCC) [I48.0]  Yes   • COVID-19 virus detected [U07.1]  Yes   • Right sided weakness [R53.1]  Yes   • T2DM (type 2 diabetes mellitus) (HCC) [E11.9]  Yes   • Essential hypertension [I10]  Yes   • Acute on chronic diastolic CHF (congestive heart failure) (HCC) [I50.33]  Yes   • History of MRSA infection [Z86.14]  Yes      Resolved Hospital Problems   No resolved problems to display.        Brief Hospital Course to date:  Otilia Givens is a 71 y.o. female with hx of HTN, DM2, PAF, GERD, TIA, and chronic venous stasis with hx of MRSA leg wounds who presents from OSH due to right sided  weakness and slurred speech. Also complained of a mild cough. Apparently her right sided weakness has been going on for 2 weeks. At the OSH, CT head was negative, NIH was 6. COVID-19 was positive. [facility]red to [facility] for Neurology evaluation.     Right sided weakness and slurred speech  Probable subacute CVA  Hx of TIA  --CT head and CT perfusion negative. CTA head/neck showed moderate to high-grade short segment stenosis in the proximal right P2 segment, mild stenosis of proximal left posterior M2 branch.  --Has hx of recent diagnosis of Afib and has been taking Plavix.  --MRI brain ordered but patient states she is very claustrophobic and even with offering pre-medication, she is refusing to have it done.  --Echo no evidence of PFO.  --Continue ASA, high dose statin.  --Has been started on Eliquis.  --Neurology has signed off.  --PT/OT/SLP.      Elevated troponin  Acute on chronic diastolic CHF  Pulmonary HTN  --Recent admission at [facility] for CHF exacerbation? Data deficit.  --Echo here reviewed: EF 52%, grade I diastolic dysfunction, mild to moderate MR and TR, moderate pulmonary HTN with RVSP 45-55 mmHg.  --s/p 20 mg IV Lasix x 1.  --Repeat troponin with normal range.  --Cardiology has seen. Started Eliquis, Metoprolol for Afib (previously on Coreg at home).  --May need to resume home Lasix--need to watch renal function closely first.   --Hold home Benazepril due to renal insufficiency, resume as tolerated.     COVID-19 positive  --Mild symptoms.  --Negative procal, normal lactate.  --CXR with mild patchy disease noted and cardiomegaly.  --On room air, only on 1 liter for comfort, does not qualify for any treatment at this time. Monitor closely and if begins to require oxygen may need to initiate treatment. Will follow labs.     Elevated Cr--Possible CKD?  --Cr 1.47 on admission, now up to 1.88, no prior labs for comparison--request PCP records today.  --Hold nephrotoxic meds and monitor closely.  --Check  urine lytes and renal ultrasound.     PAF  --Recent diagnosis, apparently has been taking Plavix..  --Cardiology has seen and started on Eliquis and Metoprolol. Stopped Plavix.  --Echo results as above.  --Currently in NSR.     HTN  --Holding home Benazepril due to elevated Cr.  --BP elevated today, will resume home Hydralazine.  --Coreg switched to Metoprolol as above.     DM2  --HbA1 8.3%.  --On insulin at home.  --Low dose SSI for now.     Hx of recurrent MRSA wound infection BLE  --Follows with wound care, has dressing changes/wraps every 2 days.  --Recently completed antibiotic course ~1-2 weeks ago, cannot remember name of antibiotic.  --PT wound following here.     DVT prophylaxis:  Medical and mechanical DVT prophylaxis orders are present.       AM-PAC 6 Clicks Score (PT): 6 (21 4201)    Disposition: I expect the patient to be discharged to SNF, TBD--apparently can not take until 21 due to COVID positive    CODE STATUS:   Code Status and Medical Interventions:   Ordered at: 21 0329     Code Status (Patient has no pulse and is not breathing):    CPR (Attempt to Resuscitate)     Medical Interventions (Patient has pulse or is breathing):    Full Support       Guerline Conley MD  21                Electronically signed by Guerline Conley MD at 21 1324          Physical Therapy Notes (most recent note)      Derik Ponce, PT at 21 0804  Version 1 of 1         Acute Care - Wound/Debridement Initial Evaluation  [facility]     Patient Name: Otilia Givens  : 1950  MRN: [id number]  Today's Date: 2021                Admit Date: 2021    Visit Dx:    ICD-10-CM ICD-9-CM   1. Dysarthria  R47.1 784.51   2. Dysphagia, unspecified type  R13.10 787.20     BLE      Patient Active Problem List   Diagnosis   • Paroxysmal atrial fibrillation (HCC)   • COVID-19 virus detected   • Right sided weakness   • T2DM (type 2 diabetes mellitus) (HCC)   • Essential hypertension   •  Personal history of transient ischemic attack (TIA), and cerebral infarction without residual deficits   • Stroke (cerebrum) (HCC)   • Chronic diastolic congestive heart failure (HCC)   • History of MRSA infection   • Coronary artery disease involving native coronary artery of native heart        Past Medical History:   Diagnosis Date   • Afib (HCC)    • CHF (congestive heart failure) (HCC)    • Diabetes (HCC)    • GERD (gastroesophageal reflux disease)    • Hypertension    • Stroke (HCC)         Past Surgical History:   Procedure Laterality Date   • HYSTERECTOMY     • TOE AMPUTATION Left            Rash 11/29/21 0840 groin patch (Active)   Distribution regional 11/29/21 2230   Configuration/Shape asymmetric 11/29/21 2230   Borders irregular 11/29/21 2230   Characteristics itching; moist 11/29/21 2230   Color red 11/29/21 2230   Care, Rash cleansed with 11/29/21 2230       Wound 11/29/21 0317 medial pubis (Active)   Dressing Appearance open to air 11/29/21 2230   Closure Open to air 11/29/21 2230   Base blanchable 11/29/21 2230   Periwound moist; intact; yeast; warm; pink 11/29/21 2230   Periwound Temperature warm 11/29/21 2230   Periwound Skin Turgor soft 11/29/21 2230   Drainage Amount none 11/29/21 2230   Care, Wound cleansed with 11/29/21 2230   Dressing Care open to air 11/29/21 2230   Periwound Care dry periwound area maintained 11/29/21 2230       Wound 11/29/21 0319 Left anterior third toe Abrasion (Active)   Dressing Appearance open to air 11/29/21 2230   Closure Open to air 11/29/21 2230   Base pink; clean 11/29/21 2230   Periwound dry; intact 11/29/21 2230   Periwound Temperature warm 11/29/21 2230   Periwound Skin Turgor soft 11/29/21 2230   Drainage Amount none 11/29/21 2230   Dressing Care open to air 11/29/21 2230   Periwound Care dry periwound area maintained 11/29/21 2230       Wound 11/29/21 0319 Left anterior fifth toe Abrasion (Active)   Dressing Appearance open to air 11/29/21 2230   Closure  Open to air 11/29/21 2230   Base pink 11/29/21 2230   Periwound dry; intact 11/29/21 2230   Periwound Temperature warm 11/29/21 2230   Periwound Skin Turgor soft 11/29/21 2230   Drainage Amount none 11/29/21 2230   Periwound Care dry periwound area maintained 11/29/21 2230       Wound 11/29/21 0320 Left lateral groin MASD (Moisture associated skin damage) (Active)   Dressing Appearance open to air 11/29/21 2230   Closure Open to air 11/29/21 2230   Base pink; red; blanchable 11/29/21 1040   Periwound dry; intact; yeast; pink 11/29/21 2230   Periwound Temperature warm 11/29/21 2230   Periwound Skin Turgor soft 11/29/21 2230   Drainage Amount none 11/29/21 2230   Care, Wound cleansed with 11/29/21 2230   Dressing Care open to air 11/29/21 2230   Periwound Care dry periwound area maintained 11/29/21 2230      Lymphedema     Row Name 11/30/21 0825             Subjective Pain    Able to rate subjective pain? yes  -              Lymphedema Edema Assessment    Ptting Edema Category By severity  -      Pitting Edema Mild  -              Skin Changes/Observations    Location/Assessment Lower Extremity  -      Lower Extremity Conditions bilateral:; intact; dry; scaly; crust; inflamed; fragile  -      Lower Extremity Color/Pigment bilateral:; erythema; red; hyperpigmented  -              Lymphedema Pulses/Capillary Refill    Lymphedema Pulses/Capillary Refill lower extremity pulses  -      Dorsalis Pedis Pulse right:; +2 normal; left:; +1 diminished  -      Posterior Tibialis Pulse right:; +2 normal; left:; +1 diminished  -LH              Lymphedema Measurements    Measurement Type(s) Quick Girth  -LH      Quick Girth Areas Lower extremities  -              LLE Quick Girth (cm)    Mid foot 23.4 cm  -LH      Smallest ankle 21.8 cm  -LH      Largest calf 35.6 cm  -LH              RLE Quick Girth (cm)    Mid foot 23.1 cm  -LH      Smallest ankle 22.7 cm  -LH      Largest calf 35 cm  -LH      RLE Quick Girth  "Total 80.8  -              Compression/Skin Care    Compression/Skin Care skin care; wrapping location  -      Skin Care washed/dried; lotion applied  -      Wrapping Location lower extremity  -      Wrapping Location LE bilateral:; foot to knee  -      Wrapping Comments BLE unna boots applied in figure eight pattern with 4\" coban and spandage for light compression.  -            User Key  (r) = Recorded By, (t) = Taken By, (c) = Cosigned By    Initials Name Provider Type     Derik Ponce, PT Physical Therapist                WOUND DEBRIDEMENT  Total area of Debridement: ~15cm2  Debridement Site 1  Location- Site 1: BLE  Selective Debridement- Site 1: Wound Surface <20cmsq  Instruments- Site 1: tweezers  Excised Tissue Description- Site 1: moderate, other (comment) (Hypertrophic crust, scabbing)  Bleeding- Site 1: scant               PT Assessment (last 12 hours)     PT Evaluation and Treatment     Row Name 11/30/21 0825          Physical Therapy Time and Intention    Subjective Information complains of; nausea/vomiting; weakness  -     Document Type evaluation; wound care  -     Mode of Treatment physical therapy; individual therapy  -     Row Name 11/30/21 0825          General Information    Patient Profile Reviewed yes  -     Risks Reviewed patient:; increased discomfort  -     Benefits Reviewed patient:; improve function; decrease pain; decrease risk of DVT; improve skin integrity; increase knowledge  -     Barriers to Rehab medically complex  -     Row Name 11/30/21 0825          Cognition    Affect/Mental Status (Cognitive) confused  -     Orientation Status (Cognition) oriented x 3  -     Row Name 11/30/21 0825          Pain    Additional Documentation Pain Scale: FACES Pre/Post-Treatment (Group)  -     Row Name 11/30/21 0825          Pain Scale: FACES Pre/Post-Treatment    Pain: FACES Scale, Pretreatment 0-->no hurt  -     Posttreatment Pain Rating 0-->no hurt  -  "    Row Name             Wound 11/29/21 0317 medial pubis    Wound - Properties Group Placement Date: 11/29/21  -BM Placement Time: 0317  -BM Present on Hospital Admission: Y  -BM Orientation: medial  -BM Location: pubis  -BM     Retired Wound - Properties Group Date first assessed: 11/29/21  -BM Time first assessed: 0317  -BM Present on Hospital Admission: Y  -BM Location: pubis  -BM     Row Name             Wound 11/29/21 0319 Left anterior third toe Abrasion    Wound - Properties Group Placement Date: 11/29/21  -BM Placement Time: 0319  -BM Present on Hospital Admission: Y  -BM Side: Left  -BM Orientation: anterior  -BM Location: third toe  -BM Primary Wound Type: Abrasion  -BM     Retired Wound - Properties Group Date first assessed: 11/29/21  -BM Time first assessed: 0319  -BM Present on Hospital Admission: Y  -BM Side: Left  -BM Location: third toe  -BM Primary Wound Type: Abrasion  -BM     Row Name             Wound 11/29/21 0319 Left anterior fifth toe Abrasion    Wound - Properties Group Placement Date: 11/29/21  -BM Placement Time: 0319  -BM Present on Hospital Admission: Y  -BM Side: Left  -BM Orientation: anterior  -BM Location: fifth toe  -BM Primary Wound Type: Abrasion  -BM     Retired Wound - Properties Group Date first assessed: 11/29/21  -BM Time first assessed: 0319  -BM Present on Hospital Admission: Y  -BM Side: Left  -BM Location: fifth toe  -BM Primary Wound Type: Abrasion  -BM     Row Name             Wound 11/29/21 0320 Left lateral groin MASD (Moisture associated skin damage)    Wound - Properties Group Placement Date: 11/29/21  -BM Placement Time: 0320  -BM Present on Hospital Admission: Y  -BM Side: Left  -BM Orientation: lateral  -BM Location: groin  -BM Primary Wound Type: MASD  -BM     Retired Wound - Properties Group Date first assessed: 11/29/21  -BM Time first assessed: 0320  -BM Present on Hospital Admission: Y  -BM Side: Left  -BM Location: groin  -BM Primary Wound Type: MASD  -BM      Row Name 11/30/21 0825          Coping    Observed Emotional State calm; cooperative; repeated requests  -     Verbalized Emotional State acceptance  -     Trust Relationship/Rapport care explained; questions answered  -     Row Name 11/30/21 0825          Plan of Care Review    Plan of Care Reviewed With patient  -     Progress no change  -     Outcome Summary PT wound care initial evaluation complete. Pt presents with BLE cellulites with accompanying mild to moderate erythema and mild edema. Pt's BLE with scattered scabbing and moderate hypertrophic crust, although no open wounds observed this date. PT able to debride crust/scabbing to help improve skin integrity. PT applied BLE unna boots in figure eight pattern to help increase venous return, improve skin integrity, and increase function. Pt would continue to benefit from skilled acute PT wound care services to address poor BLE skin integrity and inflammation, with dressing changes every 2-3 days.  -     Row Name 11/30/21 0825          Wound Care Goal 1 (PT)    Wound Care Goal 1 (PT) Decrease BLE edema by 50% to improve skin integrity and function.  -     Time Frame (Wound Care Goal 1, PT) 10 days  -     Row Name 11/30/21 0825          Positioning and Restraints    Pre-Treatment Position in bed  -     Post Treatment Position bed  -     In Bed supine; call light within reach; encouraged to call for assist  -     Row Name 11/30/21 0825          Therapy Assessment/Plan (PT)    Patient/Family Therapy Goals Statement (PT) Return to PLOF  -     PT Diagnosis (PT) BLE cellulitis, edema, erythema  -     Rehab Potential (PT) good, to achieve stated therapy goals  -     Criteria for Skilled Interventions Met (PT) yes; meets criteria; skilled treatment is necessary  -     Row Name 11/30/21 0825          PT Evaluation Complexity    History, PT Evaluation Complexity 3 or more personal factors and/or comorbidities  -     Examination of Body  Systems (PT Eval Complexity) total of 3 or more elements  -     Clinical Presentation (PT Evaluation Complexity) evolving  -     Clinical Decision Making (PT Evaluation Complexity) moderate complexity  -     Overall Complexity (PT Evaluation Complexity) moderate complexity  -     Row Name 11/30/21 0841          Therapy Plan Review/Discharge Plan (PT)    Therapy Plan Review (PT) evaluation/treatment results reviewed; care plan/treatment goals reviewed; risks/benefits reviewed; current/potential barriers reviewed; participants voiced agreement with care plan; participants included; patient  -           User Key  (r) = Recorded By, (t) = Taken By, (c) = Cosigned By    Initials Name Provider Type     Kristi Brown, RN Registered Nurse     Derik Ponce, PT Physical Therapist              Physical Therapy Education                 Title: PT OT SLP Therapies (In Progress)     Topic: Physical Therapy (Done)     Point: Mobility training (Done)     Learning Progress Summary           Patient Acceptance, E, VU,NR by NS at 11/29/2021 1115                   Point: Home exercise program (Done)     Learning Progress Summary           Patient Acceptance, E, VU,NR by NS at 11/29/2021 1115                   Point: Body mechanics (Done)     Learning Progress Summary           Patient Acceptance, E, VU,NR by NS at 11/29/2021 1115                   Point: Precautions (Done)     Learning Progress Summary           Patient Acceptance, E, VU,NR by NS at 11/29/2021 1115                               User Key     Initials Effective Dates Name Provider Type Discipline    NS 06/16/21 -  Isela Hathaway, PAT Physical Therapist PT                Recommendation and Plan  Anticipated Discharge Disposition (PT): skilled nursing facility  Planned Therapy Interventions (PT): wound care  Therapy Frequency (PT): daily  Plan of Care Reviewed With: patient   Progress: no change       Progress: no change  Outcome Summary: PT wound care  initial evaluation complete. Pt presents with BLE cellulites with accompanying mild to moderate erythema and mild edema. Pt's BLE with scattered scabbing and moderate hypertrophic crust, although no open wounds observed this date. PT able to debride crust/scabbing to help improve skin integrity. PT applied BLE unna boots in figure eight pattern to help increase venous return, improve skin integrity, and increase function. Pt would continue to benefit from skilled acute PT wound care services to address poor BLE skin integrity and inflammation, with dressing changes every 2-3 days.  Plan of Care Reviewed With: patient            Time Calculation   PT Charges     Row Name 11/30/21 0938             Time Calculation    Start Time 0825  -      PT Goal Re-Cert Due Date 12/09/21  -              Untimed Charges    PT Eval/Re-eval Minutes 33  -LH      Wound Care 21006 Selective debridement; 28046 Unna boot  -LH      29580-Unna Boot 15  -LH      72682-Ijspayeti debridement 15  -LH              Total Minutes    Untimed Charges Total Minutes 63  -LH       Total Minutes 63  -LH            User Key  (r) = Recorded By, (t) = Taken By, (c) = Cosigned By    Initials Name Provider Type     Derik Ponce, PT Physical Therapist                  Therapy Charges for Today     Code Description Service Date Service Provider Modifiers Qty    23090924569 HC PT RE-EVAL ESTABLISHED PLAN 2 11/30/2021 Derik Ponce, PT GP 1    66819449067 HC BRITTANY DEBRIDE OPEN WOUND UP TO 20CM 11/30/2021 Derik Ponce, PT GP 1    99778443758 HC PT STAPPING UNNA BOOT 11/30/2021 Derki Ponce, PT GP 1            PT G-Codes  Outcome Measure Options: AM-PAC 6 Clicks Daily Activity (OT), Modified Thayer  AM-PAC 6 Clicks Score (PT): 9  AM-PAC 6 Clicks Score (OT): 10  Modified Thayer Scale: 5 - Severe disability.  Bedridden, incontinent, and requiring constant nursing care and attention.       Derik Ponce PT  11/30/2021      Electronically signed  by Derik Ponce, PT at 21 0974          Occupational Therapy Notes (most recent note)      Samaria Mccabe, OT at 21 1056          Patient Name: Otilia Givens  : 1950    MRN: [id number]                              Today's Date: 2021       Admit Date: 2021    Visit Dx:     ICD-10-CM ICD-9-CM   1. Dysarthria  R47.1 784.51   2. Dysphagia, unspecified type  R13.10 787.20     Patient Active Problem List   Diagnosis   • Paroxysmal atrial fibrillation (HCC)   • COVID-19 virus detected   • Right sided weakness   • T2DM (type 2 diabetes mellitus) (HCC)   • Essential hypertension   • Personal history of transient ischemic attack (TIA), and cerebral infarction without residual deficits   • Acute on chronic diastolic CHF (congestive heart failure) (HCC)   • History of MRSA infection   • Coronary artery disease involving native coronary artery of native heart   • Suspected subacute cerebrovascular accident (CVA)   • Elevated serum creatinine     Past Medical History:   Diagnosis Date   • Afib (HCC)    • CHF (congestive heart failure) (HCC)    • Diabetes (HCC)    • GERD (gastroesophageal reflux disease)    • Hypertension    • Stroke (HCC)      Past Surgical History:   Procedure Laterality Date   • HYSTERECTOMY     • TOE AMPUTATION Left       General Information     Row Name 21 1248          OT Time and Intention    Document Type therapy note (daily note)  -ALICIA     Mode of Treatment individual therapy; occupational therapy  -ALICIA     Row Name 21 1248          General Information    Patient Profile Reviewed yes  -ALICIA     Existing Precautions/Restrictions fall  R sided weakness, contact and airborne  -ALICIA     Barriers to Rehab medically complex  -ALICIA     Row Name 21 1248          Cognition    Orientation Status (Cognition) oriented x 3  -ALICIA     Row Name 21 1248          Safety Issues, Functional Mobility    Impairments Affecting Function (Mobility) balance; endurance/activity  tolerance; motor control; muscle tone abnormal; postural/trunk control; range of motion (ROM); sensation/sensory awareness; strength; visual/perceptual  -ALICIA           User Key  (r) = Recorded By, (t) = Taken By, (c) = Cosigned By    Initials Name Provider Type    Samaria aSn, OT Occupational Therapist               Lymphedema     Row Name 11/30/21 0855             Subjective Pain    Able to rate subjective pain? yes  -LH      Recorded by [LH] Derik Ponce, PT              Lymphedema Edema Assessment    Ptting Edema Category By severity  -      Pitting Edema Mild  -LH      Recorded by [LH] Derik Ponce, PT              Skin Changes/Observations    Location/Assessment Lower Extremity  -      Lower Extremity Conditions bilateral:; intact; dry; scaly; crust; inflamed; fragile  -      Lower Extremity Color/Pigment bilateral:; erythema; red; hyperpigmented  -LH      Recorded by [LH] Derik Ponce, PT              Lymphedema Pulses/Capillary Refill    Lymphedema Pulses/Capillary Refill lower extremity pulses  -      Dorsalis Pedis Pulse right:; +2 normal; left:; +1 diminished  -      Posterior Tibialis Pulse right:; +2 normal; left:; +1 diminished  -LH      Recorded by [LH] Derik Ponce, PT              Lymphedema Measurements    Measurement Type(s) Quick Girth  -      Quick Girth Areas Lower extremities  -LH      Recorded by [LH] Derik Ponce, PT              LLE Quick Girth (cm)    Mid foot 23.4 cm  -LH      Smallest ankle 21.8 cm  -LH      Largest calf 35.6 cm  -LH      Recorded by [LH] Derik Ponce, PT              RLE Quick Girth (cm)    Mid foot 23.1 cm  -LH      Smallest ankle 22.7 cm  -LH      Largest calf 35 cm  -LH      RLE Quick Girth Total 80.8  -LH      Recorded by [LH] Derik Ponce, PT              Compression/Skin Care    Compression/Skin Care skin care; wrapping location  -      Skin Care washed/dried; lotion applied  -      Wrapping Location lower extremity  " -      Wrapping Location LE bilateral:; foot to knee  -      Wrapping Comments BLE unna boots applied in figure eight pattern with 4\" coban and spandage for light compression.  -      Recorded by [] Derik Ponce, PT            User Key  (r) = Recorded By, (t) = Taken By, (c) = Cosigned By    Initials Name Effective Dates     Derik Ponce, PT 09/21/21 -                Mobility/ADL's     Row Name 12/01/21 1303          Bed Mobility    Comment (Bed Mobility) Up in chair on arrival  -     Row Name 12/01/21 1303          Transfers    Comment (Transfers) Pt with increased weakness, did not attempt.  Per nurse MD stated pt. deficits would wax and wan and not concerned about changes.  Pt.'s chair to small, used lift to raise pt. and lower in larger chair procured with nursing assist.  -     Row Name 12/01/21 1303          Activities of Daily Living    BADL Assessment/Intervention grooming  -     Row Name 12/01/21 1303          Grooming Assessment/Training    [place] Level (Grooming) hair care, combing/brushing; minimum assist (75% patient effort); wash face, hands; set up; standby assist  -ALICIA     Position (Grooming) supported sitting  -ALICIA     Comment (Grooming) pt. also able to place and remove glasses  -           User Key  (r) = Recorded By, (t) = Taken By, (c) = Cosigned By    Initials Name Provider Type    Samaria San, OT Occupational Therapist               Obj/Interventions     Row Name 12/01/21 1305          Sensory Interventions    Comment, Sensory Intervention pt. increased difficulty ID location touch RUE today especially distally  -ALICIA     Row Name 12/01/21 1305          Vision Assessment/Intervention    Vision Assessment Comment pt. read clock one number off and was able to read mild carton portion that was large print accurately, some R sided inattention noted  -ALICIA     Row Name 12/01/21 1305          Shoulder (Therapeutic Exercise)    Shoulder (Therapeutic Exercise) AROM " (active range of motion)  -     Shoulder AROM (Therapeutic Exercise) left; flexion; extension; horizontal aBduction/aDduction; 10 repetitions; sitting  -     Shoulder AAROM (Therapeutic Exercise) right; flexion; extension; aBduction; aDduction; 10 repetitions; sitting  -     Row Name 12/01/21 1305          Elbow/Forearm (Therapeutic Exercise)    Elbow/Forearm (Therapeutic Exercise) PROM (passive range of motion); AROM (active range of motion); strengthening exercise  -     Elbow/Forearm PROM (Therapeutic Exercise) right; bilateral; flexion; extension; supination; pronation; sitting; 10 second hold  -     Elbow/Forearm Strengthening (Therapeutic Exercise) left; flexion; extension; 10 repetitions; sitting  manual resistance  -     Row Name 12/01/21 1305          Wrist (Therapeutic Exercise)    Wrist (Therapeutic Exercise) PROM (passive range of motion)  -     Wrist PROM (Therapeutic Exercise) right; flexion; extension; 10 repetitions  -[facility] Name 12/01/21 1305          Hand (Therapeutic Exercise)    Hand (Therapeutic Exercise) PROM (passive range of motion)  -     Hand PROM (Therapeutic Exercise) right; finger flexion; finger extension; finger aBduction; finger aDduction; 10 repetitions  -     Row Name 12/01/21 1305          Motor Skills    Functional Endurance fair  -           User Key  (r) = Recorded By, (t) = Taken By, (c) = Cosigned By    Initials Name Provider Type    Samaria San OT Occupational Therapist               Goals/Plan     [facility] Name 12/01/21 1312          Bed Mobility Goal 1 (OT)    Progress/Outcomes (Bed Mobility Goal 1, OT) goal ongoing  -[facility] Name 12/01/21 1312          Transfer Goal 1 (OT)    Progress/Outcome (Transfer Goal 1, OT) goal ongoing  -[facility] Name 12/01/21 1312          Grooming Goal 1 (OT)    Progress/Outcome (Grooming Goal 1, OT) good progress toward goal  -[facility] Name 12/01/21 1312          Strength Goal 1 (OT)    Progress/Outcome  (Strength Goal 1, OT) progress slower than expected  -ALICIA           User Key  (r) = Recorded By, (t) = Taken By, (c) = Cosigned By    Initials Name Provider Type    Samaria San, OT Occupational Therapist               Clinical Impression     Row Name 12/01/21 1308          Pain Scale: Numbers Pre/Post-Treatment    Pretreatment Pain Rating 0/10 - no pain  -ALICIA     Posttreatment Pain Rating 0/10 - no pain  -ALICIA     Row Name 12/01/21 1308          Plan of Care Review    Plan of Care Reviewed With patient  -ALICIA     Progress declining  -ALICIA     Outcome Summary Pt. noted with increased R sided weakness face, UE and LE.  Today pt. with no AAROM elbow, wrist and hand on R as did on evaluation. Pt. more inattentive to R side, but turns and looks to right when cued.  Per nurse MD aware of changes and reported the MD stated pt. would wax and wan.  Continue OT POC as pt. tolerates.  -ALICIA     Row Name 12/01/21 1308          Therapy Assessment/Plan (OT)    Therapy Frequency (OT) daily  -ALICIA     Row Name 12/01/21 1308          Therapy Plan Review/Discharge Plan (OT)    Anticipated Discharge Disposition (OT) inpatient rehabilitation facility  -ALICIA     Row Name 12/01/21 1308          Vital Signs    Pre Systolic BP Rehab --  stable, not recorded  -ALICIA     Post Systolic BP Rehab 156  -ALICIA     Post Treatment Diastolic BP 95  -ALICIA     Pre SpO2 (%) 80  02 had shifted off pt.'s nose  -ALICIA     O2 Delivery Pre Treatment room air  -ALICIA     Intra SpO2 (%) 96  -ALICIA     O2 Delivery Intra Treatment supplemental O2  -ALICIA     Post SpO2 (%) 98  -ALICIA     O2 Delivery Post Treatment supplemental O2  -ALICIA     Pre Patient Position Sitting  -ALICIA     Intra Patient Position Sitting  -ALICIA     Post Patient Position Sitting  -ALICIA     Row Name 12/01/21 1308          Positioning and Restraints    Pre-Treatment Position sitting in chair/recliner  -ALICIA     Post Treatment Position chair  -ALICIA     In Chair reclined; call light within reach; encouraged to call for assist; exit  alarm on; waffle cushion; on mechanical lift sling; RUE elevated; heels elevated  -           User Key  (r) = Recorded By, (t) = Taken By, (c) = Cosigned By    Initials Name Provider Type    Samaria San, CASSIDY Occupational Therapist               Outcome Measures     Row Name 12/01/21 1313          How much help from another is currently needed...    Putting on and taking off regular lower body clothing? 1  -ALICIA     Bathing (including washing, rinsing, and drying) 1  -ALICIA     Toileting (which includes using toilet bed pan or urinal) 1  -ALICIA     Putting on and taking off regular upper body clothing 1  -ALICIA     Taking care of personal grooming (such as brushing teeth) 2  -ALICIA     Eating meals 3  -ALICIA     AM-PAC 6 Clicks Score (OT) 9  -     Row Name 12/01/21 0929          How much help from another person do you currently need...    Turning from your back to your side while in flat bed without using bedrails? 1  -JJ     Moving from lying on back to sitting on the side of a flat bed without bedrails? 1  -JJ     Moving to and from a bed to a chair (including a wheelchair)? 1  -JJ     Standing up from a chair using your arms (e.g., wheelchair, bedside chair)? 1  -JJ     Climbing 3-5 steps with a railing? 1  -JJ     To walk in hospital room? 1  -J     AM-PAC 6 Clicks Score (PT) 6  -     Row Name 12/01/21 1313          Modified Morgan Scale    Pre-Stroke Modified Lizemores Scale 6 - Unable to determine (UTD) from the medical record documentation  -     Modified Morgan Scale 5 - Severe disability.  Bedridden, incontinent, and requiring constant nursing care and attention.  -     Row Name 12/01/21 1313          Functional Assessment    Outcome Measure Options AM-PAC 6 Clicks Daily Activity (OT); Modified Lizemores  -           User Key  (r) = Recorded By, (t) = Taken By, (c) = Cosigned By    Initials Name Provider Type    Samaria San, OT Occupational Therapist    Ramos Zhao, RN Registered Nurse                 Occupational Therapy Education                 Title: PT OT SLP Therapies (In Progress)     Topic: Occupational Therapy (In Progress)     Point: ADL training (In Progress)     Description:   Instruct learner(s) on proper safety adaptation and remediation techniques during self care or transfers.   Instruct in proper use of assistive devices.              Learning Progress Summary           Patient Acceptance, E,D, NR by ALICIA at 12/1/2021 1315    Comment: noted changes in strength, ROM and sensation, RUE TE, grooming initiation    Acceptance, E,D, VU,NR by ALICIA at 11/29/2021 1103    Comment: reason for consult, noted deficit, UE TE, but mobility                   Point: Home exercise program (In Progress)     Description:   Instruct learner(s) on appropriate technique for monitoring, assisting and/or progressing therapeutic exercises/activities.              Learning Progress Summary           Patient Acceptance, E,D, NR by ALICIA at 12/1/2021 1315    Comment: noted changes in strength, ROM and sensation, RUE TE, grooming initiation    Acceptance, E,D, VU,NR by ALICIA at 11/29/2021 1103    Comment: reason for consult, noted deficit, UE TE, but mobility                   Point: Precautions (Not Started)     Description:   Instruct learner(s) on prescribed precautions during self-care and functional transfers.              Learner Progress:  Not documented in this visit.          Point: Body mechanics (In Progress)     Description:   Instruct learner(s) on proper positioning and spine alignment during self-care, functional mobility activities and/or exercises.              Learning Progress Summary           Patient Acceptance, E,D, NR by ALICIA at 12/1/2021 1315    Comment: noted changes in strength, ROM and sensation, RUE TE, grooming initiation    Acceptance, E,D, VU,NR by ALICIA at 11/29/2021 1103    Comment: reason for consult, noted deficit, UE TE, but mobility                               User Key     Initials Effective  Dates Name Provider Type Discipline     06/16/21 -  Samaria Mccabe OT Occupational Therapist OT              OT Recommendation and Plan  Planned Therapy Interventions (OT): activity tolerance training, adaptive equipment training, BADL retraining, functional balance retraining, occupation/activity based interventions, patient/caregiver education/training, strengthening exercise, transfer/mobility retraining  Therapy Frequency (OT): daily  Plan of Care Review  Plan of Care Reviewed With: patient  Progress: declining  Outcome Summary: Pt. noted with increased R sided weakness face, UE and LE.  Today pt. with no AAROM elbow, wrist and hand on R as did on evaluation. Pt. more inattentive to R side, but turns and looks to right when cued.  Per nurse MD aware of changes and reported the MD stated pt. would wax and wan.  Continue OT POC as pt. tolerates.     Time Calculation:    Time Calculation- OT     Row Name 12/01/21 1316             Time Calculation- OT    OT Start Time 1056  -ALICIA      OT Received On 12/01/21  -ALICIA      OT Goal Re-Cert Due Date 12/09/21  -              Timed Charges    80838 - OT Therapeutic Exercise Minutes 25  -ALICIA      23054 - OT Therapeutic Activity Minutes 8  -ALICIA      66133 - OT Self Care/Mgmt Minutes 8  -ALICIA              Total Minutes    Timed Charges Total Minutes 41  -ALICIA       Total Minutes 41  -ALICIA            User Key  (r) = Recorded By, (t) = Taken By, (c) = Cosigned By    Initials Name Provider Type    AILCIA Samaria Mccabe OT Occupational Therapist              Therapy Charges for Today     Code Description Service Date Service Provider Modifiers Qty    80519881424 HC OT THERAPEUTIC ACT EA 15 MIN 12/1/2021 Samaria Mccabe OT GO 1    47440279596 HC OT THER PROC EA 15 MIN 12/1/2021 Samaria Mccabe OT GO 2               Samaria Mccabe OT  12/1/2021    Electronically signed by Samaria Mccabe OT at 12/01/21 1313

## 2021-12-02 NOTE — PLAN OF CARE
Goal Outcome Evaluation:  Plan of Care Reviewed With: patient        Progress: improving  Outcome Summary: Pt A/OX3. NIH=10; Monitor shows SR. Denies CP. Remains on 1LNC with sats >90% No resp distress noted. Frequently seeks staff attention. Skin care done. VSS. Will cont with POC

## 2021-12-03 NOTE — PLAN OF CARE
Goal Outcome Evaluation:  Plan of Care Reviewed With: patient        Progress: no change  Outcome Summary: Pt tearful upon OT arrival about deficits present from stroke. SUA for grooming while supported in chair. Pt tolerated AROM w/ LUE and PROM w/ RUE x 10 reps in functional planes. Continue to progress as able per current POC. Recommendation upon d/c for SNF, will monitor progress closely.

## 2021-12-03 NOTE — PROGRESS NOTES
[facility] Medicine Services  PROGRESS NOTE    Patient Name: Otilia Givens  : 1950  MRN: [id number]    Date of Admission: 2021  Primary Care Physician: Shannon Aly MD    Subjective   Subjective     CC:  F/U CVA    HPI:  Sitting up in chair. Appears tired and falling asleep during visit.  Reports no BM still despite meds  Notes some BLE pain. RN reports giving tylenol    ROS:  Gen-no fevers, no chills  CV-no chest pain, no palpitations  Resp-no cough, no dyspnea  GI-no N/V/D, no abd pain    All other systems reviewed and negative except any additional pertinent positives and negatives as discussed in HPI.      Objective   Objective     Vital Signs:   Temp:  [96 °F (35.6 °C)-97.4 °F (36.3 °C)] 97.3 °F (36.3 °C)  Heart Rate:  [68-88] 81  Resp:  [16-20] 20  BP: (136-148)/(74-90) 138/75  Flow (L/min):  [1] 1     Physical Exam:    Constitutional: No acute distress, sleeping, arouses to voice, chronically ill appearing  HENT: NCAT, mucous membranes moist  Respiratory: respiratory effort normal on RA  Cardiovascular: NSR on tele  Musculoskeletal: BLE leg wraps in place- BLE edema  Psychiatric: Appropriate affect, cooperative  Neurologic: Oriented x 3, Right sided weakness, Cranial Nerves grossly intact to confrontation  Skin: No rashes      Results Reviewed:  LAB RESULTS:      Lab 21  0527 21  0923 21  0903 21  0338 21  0337   WBC 4.67 5.45 5.24 3.86  --    HEMOGLOBIN 12.7 12.4 11.5* 12.4  --    HEMATOCRIT 40.3 38.7 35.1 37.9  --    PLATELETS 162 161 166 184  --    NEUTROS ABS  --   --   --  2.45  --    IMMATURE GRANS (ABS)  --   --   --  0.01  --    LYMPHS ABS  --   --   --  0.73  --    MONOS ABS  --   --   --  0.54  --    EOS ABS  --   --   --  0.12  --    MCV 86.1 85.1 83.0 83.5  --    CRP  --  3.21*  --  2.11*  --    PROCALCITONIN  --   --   --  0.18  --    LACTATE  --   --   --  1.5  --    LDH  --   --   --  200  --    PROTIME  --   --   --   --   13.2   APTT  --   --   --   --  35.6   D DIMER QUANT  --   --   --   --  1.08*         Lab 12/02/21  0527 12/01/21  0923 11/30/21  0903 11/29/21  0338   SODIUM 137 133* 136 139   POTASSIUM 4.6 4.5 4.3 4.2   CHLORIDE 102 101 103 102   CO2 21.0* 23.0 23.0 26.0   ANION GAP 14.0 9.0 10.0 11.0   BUN 42* 38* 32* 31*   CREATININE 1.73* 1.88* 1.58* 1.47*   GLUCOSE 185* 182* 183* 236*   CALCIUM 8.2* 8.1* 7.9* 8.6   MAGNESIUM  --   --  2.3 1.9   HEMOGLOBIN A1C  --   --   --  8.30*         Lab 12/01/21  0923 11/29/21  0338   TOTAL PROTEIN 6.5 6.9   ALBUMIN 2.90* 3.00*   GLOBULIN 3.6 3.9   ALT (SGPT) 11 14   AST (SGOT) 21 20   BILIRUBIN 0.6 0.6   ALK PHOS 104 108         Lab 11/29/21  1513 11/29/21  0338 11/29/21 0337   PROBNP  --  7,820.0*  --    TROPONIN T 0.027 0.036*  --    PROTIME  --   --  13.2   INR  --   --  1.03         Lab 11/29/21 0338   CHOLESTEROL 135   LDL CHOL 81   HDL CHOL 34*   TRIGLYCERIDES 105         Lab 11/29/21  0338   FERRITIN 457.20*         Brief Urine Lab Results  (Last result in the past 365 days)      Color   Clarity   Blood   Leuk Est   Nitrite   Protein   CREAT   Urine HCG        12/01/21 1738             130.3               Microbiology Results Abnormal     Procedure Component Value - Date/Time    Blood Culture - Blood, Hand, Right [[id number]]  (Normal) Collected: 11/29/21 0536    Lab Status: Preliminary result Specimen: Blood from Hand, Right Updated: 12/03/21 0615     Blood Culture No growth at 4 days    Blood Culture - Blood, Arm, Right [[id number]]  (Normal) Collected: 11/29/21 0337    Lab Status: Preliminary result Specimen: Blood from Arm, Right Updated: 12/03/21 0400     Blood Culture No growth at 4 days    Urine Culture - Urine, Urine, Clean Catch [[id number]] Collected: 11/29/21 0419    Lab Status: Final result Specimen: Urine, Clean Catch Updated: 11/30/21 1503     Urine Culture 50,000 CFU/mL Mixed Joanne Isolated    Narrative:      Specimen contains mixed organisms of questionable  pathogenicity which indicates contamination with commensal jacey.  Further identification is unlikely to provide clinically useful information.  Suggest recollection.          US Renal Bilateral    Result Date: 12/2/2021  EXAMINATION: US RENAL BILATERAL-  INDICATION: ROXY; R47.1-Dysarthria and anarthria; R13.10-Dysphagia, unspecified; U07.1-COVID-19  COMPARISON: NONE  FINDINGS: Sonographic grayscale and color Doppler evaluation of the kidneys demonstrates  Right kidney measures 8.9 cm in length, without apparent mass or hydronephrosis. Normal color Doppler flow.  Left kidney measures 11.8 cm in length without apparent mass or hydronephrosis. Normal color Doppler flow.  Urinary bladder not evaluated due to patient determination of the scan      Impression: Moderately atrophic right kidney, otherwise without evidence of hydronephrosis.  This report was finalized on 12/2/2021 9:09 AM by Roberto Ocampo.        Results for orders placed during the hospital encounter of 11/29/21    Adult Transthoracic Echo Complete W/ Cont if Necessary Per Protocol (With Agitated Saline)    Interpretation Summary  · Left ventricular systolic function is normal. Estimated left ventricular EF = 52%.  · Left ventricular wall thickness is consistent with mild concentric hypertrophy.  · Mild to moderate mitral valve regurgitation is present with an eccentric jet noted.  · Mild to moderate tricuspid valve regurgitation is present.  · Estimated right ventricular systolic pressure from tricuspid regurgitation is moderately elevated (45-55 mmHg).  · Saline test results are negative for right to left atrial level shunt.      I have reviewed the medications:  Scheduled Meds:albuterol sulfate HFA, 2 puff, Inhalation, 4x Daily - RT  apixaban, 5 mg, Oral, Q12H  aspirin, 81 mg, Oral, Daily  atorvastatin, 80 mg, Oral, Nightly  furosemide, 20 mg, Intravenous, Once  hydrALAZINE, 25 mg, Oral, TID  insulin lispro, 0-7 Units, Subcutaneous, TID AC  metoprolol  tartrate, 25 mg, Oral, Q12H  miconazole, , Topical, Q12H  nystatin, , Topical, Q12H  pantoprazole, 40 mg, Oral, Q AM  polyethylene glycol, 17 g, Oral, Daily   And  senna-docusate sodium, 2 tablet, Oral, BID  sodium chloride, 10 mL, Intravenous, Q12H  sodium chloride, 10 mL, Intravenous, Q12H      Continuous Infusions:   PRN Meds:.•  acetaminophen **OR** acetaminophen **OR** acetaminophen  •  benzonatate  •  senna-docusate sodium **AND** polyethylene glycol **AND** bisacodyl **AND** bisacodyl  •  dextrose  •  dextrose  •  glucagon (human recombinant)  •  lactulose  •  magnesium sulfate **OR** magnesium sulfate **OR** magnesium sulfate  •  ondansetron **OR** ondansetron  •  potassium chloride **OR** potassium chloride **OR** potassium chloride  •  senna-docusate sodium **AND** [DISCONTINUED] polyethylene glycol **AND** [DISCONTINUED] bisacodyl **AND** [DISCONTINUED] bisacodyl  •  sodium chloride  •  sodium chloride    Assessment/Plan   Assessment & Plan     Active Hospital Problems    Diagnosis  POA   • **Suspected subacute cerebrovascular accident (CVA) [R09.89]  Yes   • Elevated serum creatinine [R79.89]  Yes   • Paroxysmal atrial fibrillation (HCC) [I48.0]  Yes   • COVID-19 virus detected [U07.1]  Yes   • Right sided weakness [R53.1]  Yes   • T2DM (type 2 diabetes mellitus) ([facility]) [E11.9]  Yes   • Essential hypertension [I10]  Yes   • Acute on chronic diastolic CHF (congestive heart failure) ([facility]) [I50.33]  Yes   • History of MRSA infection [Z86.14]  Yes      Resolved Hospital Problems   No resolved problems to display.        Brief Hospital Course to date:  Otilia Givens is a 71 y.o. female with hx of HTN, DM2, PAF, GERD, TIA, and chronic venous stasis with hx of MRSA leg wounds who presents from OSH due to right sided weakness and slurred speech. Also complained of a mild cough. Apparently her right sided weakness has been going on for 2 weeks. At the OSH, CT head was negative, NIH was 6. COVID-19 was positive.  Transferred to [facility] for Neurology evaluation.    This patient's problems and plans were partially entered by my partner and updated as appropriate by me 12/03/21.      Right sided weakness and slurred speech  Probable subacute CVA  Hx of TIA  --CT head and CT perfusion negative. CTA head/neck showed moderate to high-grade short segment stenosis in the proximal right P2 segment, mild stenosis of proximal left posterior M2 branch.  --Has hx of recent diagnosis of Afib and has been taking Plavix.  --MRI brain ordered but patient states she is very claustrophobic and even with offering pre-medication, she is refusing to have it done.  --Echo no evidence of PFO.  --Continue ASA, high dose statin.  --Has been started on Eliquis.  --Neurology has signed off.  --PT/OT/SLP.      Elevated troponin  Acute on chronic diastolic CHF  Pulmonary HTN  --Recent admission at [facility] for CHF exacerbation? Data deficit.  --Echo here reviewed: EF 52%, grade I diastolic dysfunction, mild to moderate MR and TR, moderate pulmonary HTN with RVSP 45-55 mmHg.  --s/p 20 mg IV Lasix x 1.  --Repeat troponin with normal range.  --Cardiology has seen. Started Eliquis, Metoprolol bid for Afib (previously on Coreg at home).  --May need to resume home Lasix--need to watch renal function closely first. Currently trending down, will repeat bmp in am  --Hold home Benazepril due to renal insufficiency, resume as tolerated. BP stable     COVID-19 positive  --Mild symptoms.  --Negative procal, normal lactate.  --CXR with mild patchy disease noted and cardiomegaly.  --On room air, only on 1 liter for comfort, does not qualify for any treatment at this time. Monitor closely and if begins to require oxygen may need to initiate treatment. Will follow labs.     Elevated Cr--Possible CKD?  --Cr 1.47 on admission, trending down-no prior labs for comparison  --Hold nephrotoxic meds and monitor closely.  --urine reviewed. Renal ultrasound with moderately atrophic  right kidney, otherwise unremarkable     PAF  --Recent diagnosis, has been taking Plavix..  --Cardiology has seen and started on Eliquis and Metoprolol. Stopped Plavix.  --Echo results as above.  --Currently in NSR.     HTN  --Holding home Benazepril due to elevated Cr.  --home hydralazine restarted, bp better, stable. No change today  --Coreg switched to Metoprolol as above.     DM2  --HbA1 8.3%.  --On insulin at home.  --Low dose SSI for now. No change     Hx of recurrent MRSA wound infection BLE  --Follows with wound care, has dressing changes/wraps every 2 days.  --Recently completed antibiotic course ~1-2 weeks ago, cannot remember name of antibiotic.  --PT wound following here.     DVT prophylaxis:  Medical and mechanical DVT prophylaxis orders are present.       AM-PAC 6 Clicks Score (PT): 7 (12/03/21 0800)    Disposition: I expect the patient to be discharged to SNF, TBD--apparently can not take until 12/9/21 due to COVID positive    CODE STATUS:   Code Status and Medical Interventions:   Ordered at: 11/29/21 0329     Code Status (Patient has no pulse and is not breathing):    CPR (Attempt to Resuscitate)     Medical Interventions (Patient has pulse or is breathing):    Full Support       Chantel Weathers, APRN  12/03/21

## 2021-12-03 NOTE — MBS/VFSS/FEES
Acute Care - Speech Language Pathology   Swallow Initial Evaluation  Cassie   Modified Barium Swallow Study (MBS)     Patient Name: Otilia Givens  : 1950  MRN: [id number]  Today's Date: 12/3/2021               Admit Date: 2021    Visit Dx:     ICD-10-CM ICD-9-CM   1. Dysarthria  R47.1 784.51   2. Oral phase dysphagia  R13.11 787.21   3. COVID-19 virus detected  U07.1 079.89     Patient Active Problem List   Diagnosis   • Paroxysmal atrial fibrillation (HCC)   • COVID-19 virus detected   • Right sided weakness   • T2DM (type 2 diabetes mellitus) (HCC)   • Essential hypertension   • Personal history of transient ischemic attack (TIA), and cerebral infarction without residual deficits   • Acute on chronic diastolic CHF (congestive heart failure) ([facility])   • History of MRSA infection   • Coronary artery disease involving native coronary artery of native heart   • Suspected subacute cerebrovascular accident (CVA)   • Elevated serum creatinine     Past Medical History:   Diagnosis Date   • Afib (HCC)    • CHF (congestive heart failure) (HCC)    • Diabetes (HCC)    • GERD (gastroesophageal reflux disease)    • Hypertension    • Stroke (HCC)      Past Surgical History:   Procedure Laterality Date   • HYSTERECTOMY     • TOE AMPUTATION Left        SLP Recommendation and Plan  SLP Swallowing Diagnosis: mild-moderate, oral dysphagia, functional pharyngeal phase (21)  SLP Diet Recommendation: soft textures, chopped, thin liquids (21)  Recommended Precautions and Strategies: upright posture during/after eating, general aspiration precautions, check mouth frequently for oral residue/pocketing (21)  SLP Rec. for Method of Medication Administration: meds whole, with thin liquids, meds crushed, with pudding or applesauce, as tolerated (21)     Monitor for Signs of Aspiration: yes, notify SLP if any concerns (21)     Swallow Criteria for Skilled Therapeutic  Interventions Met: demonstrates skilled criteria (12/03/21 1545)  Anticipated Discharge Disposition (SLP): anticipate therapy at next level of care (12/03/21 1545)  Rehab Potential/Prognosis, Swallowing: good, to achieve stated therapy goals (12/03/21 1545)  Therapy Frequency (Swallow): 3 days per week (12/03/21 1545)  Predicted Duration Therapy Intervention (Days): until discharge (12/03/21 1545)             Plan of Care Reviewed With: patient  Progress: no change      SWALLOW EVALUATION (last 72 hours)     SLP Adult Swallow Evaluation     Row Name 12/03/21 1545 12/03/21 0915 12/01/21 1410             Rehab Evaluation    Document Type evaluation  -AC therapy note (daily note)  -SM --      Subjective Information no complaints  -AC complains of  discomfort all over. RN notified  -SM --      Patient Observations cooperative; lethargic  -AC lethargic; cooperative  -SM --      Patient/Family/Caregiver Comments/Observations No family present.  -AC -- --      Patient Effort adequate  -AC adequate  -SM --              General Information    Patient Profile Reviewed yes  -AC -- --      Pertinent History Of Current Problem See previous eval.  -AC -- --      Current Method of Nutrition mechanical soft, no mixed consistencies; nectar/syrup-thick liquids  -AC -- --      Plans/Goals Discussed with patient; agreed upon  -AC -- --      Barriers to Rehab none identified  -AC -- --      Patient's Goals for Discharge patient did not state  -AC -- --              Pain Scale: FACES Pre/Post-Treatment    Pain: FACES Scale, Pretreatment 0-->no hurt  -AC 4-->hurts little more  -SM --      Posttreatment Pain Rating 0-->no hurt  -AC 4-->hurts little more  -SM --              Oral Motor Structure and Function    Dentition Assessment edentulous  -AC -- --              General Eating/Swallowing Observations    Eating/Swallowing Skills fed by SLP; self-fed; appropriate self-feeding skills observed  -AC -- --      Positioning During Eating  upright 90 degree; upright in chair  -AC -- --              MBS/VFSS    Utensils Used spoon; cup; straw  -AC -- --      Consistencies Trialed thin liquids; pudding thick; regular textures  -AC -- --              MBS/VFSS Interpretation    Oral Prep Phase impaired oral phase of swallowing  -AC -- --      Oral Transit Phase impaired  -AC -- --      Oral Residue impaired  -AC -- --              Oral Preparatory Phase    Oral Preparatory Phase prolonged manipulation; anterior loss  -AC -- --      Anterior Loss thin liquids; secondary to reduced labial seal; other (see comments)  only w/ cup sips  -AC -- --      Prolonged Manipulation regular textures; secondary to reduced lingual strength; other (see comments)  also affected by edentition  -AC -- --              Oral Transit Phase    Impaired Oral Transit Phase piecemeal oral transit; premature spillage of liquids into pharynx  -AC -- --      Piecemeal Oral Transit all consistencies tested; discoordination of lingual movement  -AC -- --      Premature Spillage of Liquids into Pharynx thin liquids; secondary to reduced lingual control  -AC -- --              Oral Residue    Impaired Oral Residue lingual residue  -AC -- --      Lingual Residue all consistencies tested; secondary to reduced lingual strength  -AC -- --      Response to Oral Residue cleared residue; with spontaneous subsequent swallow  -AC -- --      Oral Residue, Comment Mild-mod amount  -AC -- --              Initiation of Pharyngeal Swallow    Initiation of Pharyngeal Swallow WFL; bolus in valleculae  -AC -- --      Pharyngeal Phase functional pharyngeal phase of swallowing  -AC -- --      Penetration During the Swallow thin liquids  -AC -- --      Response to Penetration shallow; transient; other (see comments)  very shallow/trace; intermittent  -AC -- --      Rosenbek's Scale thin:; 2--->level 2; pudding/puree:; regular textures:; 1--->level 1  -AC -- --      Pharyngeal Residue other (see comments)  no  significant pharyngeal residue  -AC -- --      Pharyngeal Phase, Comment Grossly WFL. Functional delay in swallow initiation. Good airway protection. No aspiration w/ any consistency--even when pushed w/ consecutive drinks of thin liquid via straw. Spontaneously tucked chin as swallowed during study.  -AC -- --              Clinical Impression    SLP Swallowing Diagnosis mild-moderate; oral dysphagia; functional pharyngeal phase  -AC -- --      Functional Impact risk of aspiration/pneumonia  -AC -- risk of aspiration/pneumonia  -      Rehab Potential/Prognosis, Swallowing good, to achieve stated therapy goals  -AC -- good, to achieve stated therapy goals  -      Swallow Criteria for Skilled Therapeutic Interventions Met demonstrates skilled criteria  -AC -- demonstrates skilled criteria  -              SLP Treatment Clinical Impressions    Treatment Assessment (SLP) -- Showing concerns for pharyngeal dysphagia/aspiration. RN also noted coughing with liquids earlier this AM.  - Patient with prolonged mastication of regular solids and expectorated items she was unable to chew. DIet consistency changed to soft, chopped and thin liquids. SLP to follow up for diet tolerance. Patient continues with dysarthria. She participated well with tx this date.  -      Plan for Continued Treatment (SLP) -- further assessment needed (see comments)  [facility] [place] Changed to Dysphagia Level 4 [facility]-soft diet and nectar-thick liquids until then. If any further concern from RN, to make NPO x meds.  - --      Care Plan Review -- evaluation/treatment results reviewed; patient/other agree to care plan  - --              Recommendations    Therapy Frequency (Swallow) 3 days per week  -AC -- PRN  -      Predicted Duration Therapy Intervention (Days) until discharge  - -- --      SLP Diet Recommendation soft textures; chopped; thin liquids  - -- soft textures; chopped; thin liquids; other (see comments)  add gravy/sauce for  moisture  -      Recommended Precautions and Strategies upright posture during/after eating; general aspiration precautions; check mouth frequently for oral residue/pocketing  - -- upright posture during/after eating; small bites of food and sips of liquid; check mouth frequently for oral residue/pocketing; general aspiration precautions; assist with feeding  -      Oral Care Recommendations Oral Care BID/PRN  -AC -- Oral Care BID/PRN  -CH      SLP Rec. for Method of Medication Administration meds whole; with thin liquids; meds crushed; with pudding or applesauce; as tolerated  -AC -- meds whole; with thin liquids; with pudding or applesauce; as tolerated  -      Monitor for Signs of Aspiration yes; notify SLP if any concerns  - -- yes; notify SLP if any concerns  -      Anticipated Discharge Disposition (SLP) anticipate therapy at next level of care  - -- --            User Key  (r) = Recorded By, (t) = Taken By, (c) = Cosigned By    Initials Name Effective Dates     Evelina Bain, MS CCC-SLP 06/16/21 -      Fartun Larsen, MS CCC-SLP 06/16/21 -      Nataliia Hernandez, MS CCC-SLP 06/16/21 -                 EDUCATION  The patient has been educated in the following areas:   Dysphagia (Swallowing Impairment) Oral Care/Hydration Modified Diet Instruction.        SLP GOALS     Row Name 12/03/21 1545 12/03/21 0915 12/01/21 1410       Oral Nutrition/Hydration Goal 1 (SLP)    Oral Nutrition/Hydration Goal 1, SLP LTG: Pt will tolerate soft choppeddiet and thin liquids w/o s/s of aspiration or distress.  -AC LTG: Pt will tolerate soft choppeddiet and thin liquids w/o s/s of aspiration or distress.  -SM LTG: Pt will tolerate soft choppeddiet and thin liquids w/o s/s of aspiration or distress.  -    Time Frame (Oral Nutrition/Hydration Goal 1, SLP) by discharge  -AC by discharge  - by discharge  -    Barriers (Oral Nutrition/Hydration Goal 1, SLP) -- -- Unable to sufficiently masticate regular  solids and had to expectorate. DIet changed to soft, chopped and thin liquids. Goal modified to reflect this.  -CH    Progress/Outcomes (Oral Nutrition/Hydration Goal 1, SLP) goal ongoing  -AC continuing progress toward goal  -SM continuing progress toward goal  -CH       Oral Nutrition/Hydration Goal 2 (SLP)    Oral Nutrition/Hydration Goal 2, SLP Pt will tolerate trials of soft solids and thin liquids w/o s/s of aspiration w/ 100% acc and no cues.  -AC Pt will tolerate trials of soft solids and thin liquids w/o s/s of aspiration w/ 100% acc and no cues.  -SM Pt will tolerate trials of soft solids and thin liquids w/o s/s of aspiration w/ 100% acc and no cues.  -CH    Time Frame (Oral Nutrition/Hydration Goal 2, SLP) short term goal (STG)  -AC short term goal (STG)  -SM short term goal (STG)  -CH    Barriers (Oral Nutrition/Hydration Goal 2, SLP) -- Lethargy partially impacting issues with solid. Multiple swallows with thin liquids and delayed cough. No s/s aspiration with nectar-thick liquid trials.  -SM Unable to sufficiently masticate regular solids and had to expectorate. DIet changed to soft, chopped and thin liquids. Goal modified to reflect this.  -CH    Progress/Outcomes (Oral Nutrition/Hydration Goal 2, SLP) goal ongoing  -AC continuing progress toward goal  -SM continuing progress toward goal  -CH       Labial Strengthening Goal 1 (SLP)    Activity (Labial Strengthening Goal 1, SLP) increase labial tone  -AC -- --    Increase Labial Tone labial resistance exercises  -AC -- --    [place]/Accuracy (Labial Strengthening Goal 1, SLP) with minimal cues (75-90% accuracy)  -AC -- --    Time Frame (Labial Strengthening Goal 1, SLP) short term goal (STG)  -AC -- --       Lingual Strengthening Goal 1 (SLP)    Activity (Lingual Strengthening Goal 1, SLP) increase lingual tone/sensation/control/coordination/movement; increase tongue back strength  -AC -- --    Increase Lingual  Tone/Sensation/Control/Coordination/Movement lingual resistance exercises  -AC -- --    Increase Tongue Back Strength lingual resistance exercises  -AC -- --    [place]/Accuracy (Lingual Strengthening Goal 1, SLP) with minimal cues (75-90% accuracy)  -AC -- --    Time Frame (Lingual Strengthening Goal 1, SLP) short term goal (STG)  -AC -- --       Reduce Perception of Hypernasality Goal 1 (SLP)    Reduce Perception of Hypernasality By Goal 1 (SLP) -- opening mouth wider for speech; 80%; with minimal cues (75-90%)  -SM opening mouth wider for speech; 80%; with minimal cues (75-90%)  -CH    Time Frame (Perception of Hypernasality Goal 1, SLP) -- short term goal (STG)  -SM short term goal (STG)  -CH    Progress (Perception of Hypernasality Goal 1, SLP) -- -- 60%; with moderate cues (50-74%)  -CH    Progress/Outcomes (Perception of Hypernasality Goal 1, SLP) -- goal ongoing  -SM continuing progress toward goal  -CH    Comment (Perception of Hypernasality Goal 1, SLP) -- Attempted. Could not sustain alertness needed to successfully target goal  -SM --       Articulation Goal 1 (SLP)    Improve Articulation Goal 1 (SLP) -- by over-articulating at word level; by over-articulating at phrase level; by over-articulating in connected speech; 80%; with minimal cues (75-90%)  -SM by over-articulating at word level; by over-articulating at phrase level; by over-articulating in connected speech; 80%; with minimal cues (75-90%)  -CH    Time Frame (Articulation Goal 1, SLP) -- short term goal (STG)  -SM short term goal (STG)  -CH    Progress (Articulation Goal 1, SLP) -- -- 70%; with minimal cues (75-90%)  -CH    Progress/Outcomes (Articulation Goal 1, SLP) -- goal ongoing  -SM continuing progress toward goal  -CH       Additional Goal 1 (SLP)    Additional Goal 1, SLP -- LTG: Pt will improve speech intelligibility in order to be able to increase participation in conversations and care at this level.  -SM LTG: Pt will improve  speech intelligibility in order to be able to increase participation in conversations and care at this level.  -    Time Frame (Additional Goal 1, SLP) -- by discharge  -SM by discharge  -CH    Barriers (Additional Goal 1, SLP) -- Speech intelligibility 90% with unfamiliar listener in quiet environment. Obvious nasality and imprecise articulation noted.  - --    Progress/Outcomes (Additional Goal 1, SLP) -- continuing progress toward goal  -SM continuing progress toward goal  -CH          User Key  (r) = Recorded By, (t) = Taken By, (c) = Cosigned By    Initials Name Provider Type    Evelina Rascon, MS CCC-SLP Speech and Language Pathologist    AC Fartun Larsen, MS CCC-SLP Speech and Language Pathologist    Nataliia Salgado MS CCC-SLP Speech and Language Pathologist                   Time Calculation:    Time Calculation- SLP     Row Name 12/03/21 1631 12/03/21 1028          Time Calculation- SLP    SLP Start Time 1545  -AC 0915  -SM     SLP Received On 12/03/21  -AC 12/03/21  -            Untimed Charges    68802-ZZ Motion Fluoro Eval Swallow Minutes 51  -AC --     02795-OZ Treatment/ST Modification Prosth Aug Alter  -- 10  -SM     20961-KY Treatment Swallow Minutes -- 28  -SM            Total Minutes    Untimed Charges Total Minutes 51  -AC 38  -SM      Total Minutes 51  -AC 38  -SM           User Key  (r) = Recorded By, (t) = Taken By, (c) = Cosigned By    Initials Name Provider Type    Evelina Rascon MS CCC-SLP Speech and Language Pathologist     Fartun Larsen, MS CCC-SLP Speech and Language Pathologist                Therapy Charges for Today     Code Description Service Date Service Provider Modifiers Qty    [id number]  ST MOTION FLUORO EVAL SWALLOW 3 12/3/2021 Fartun Larsen, MS CCC-SLP GN 1        Patient was not wearing a face mask and did not exhibit coughing during this therapy encounter.  Procedure performed was aerosolizing, involved close contact (within 6 feet for  at least 15 minutes or longer), and did not involve contact with infectious secretions or specimens.  Therapist used appropriate personal protective equipment including gloves, standard procedure mask, eye protection, gown and N95 mask.  Appropriate PPE was worn during the entire therapy session.  Hand hygiene was completed before and after therapy session.          Fartun Larsen MS CCC-SLP  12/3/2021

## 2021-12-03 NOTE — THERAPY TREATMENT NOTE
Acute Care - Speech Language Pathology   Swallow Treatment Note [facility]     Patient Name: Otilia Givens  : 1950  MRN: [id number]  Today's Date: 12/3/2021               Admit Date: 2021    Visit Dx:     ICD-10-CM ICD-9-CM   1. Dysarthria  R47.1 784.51   2. Dysphagia, unspecified type  R13.10 787.20   3. COVID-19 virus detected  U07.1 079.89     Patient Active Problem List   Diagnosis   • Paroxysmal atrial fibrillation (HCC)   • COVID-19 virus detected   • Right sided weakness   • T2DM (type 2 diabetes mellitus) (HCC)   • Essential hypertension   • Personal history of transient ischemic attack (TIA), and cerebral infarction without residual deficits   • Acute on chronic diastolic CHF (congestive heart failure) (HCC)   • History of MRSA infection   • Coronary artery disease involving native coronary artery of native heart   • Suspected subacute cerebrovascular accident (CVA)   • Elevated serum creatinine     Past Medical History:   Diagnosis Date   • Afib (HCC)    • CHF (congestive heart failure) (HCC)    • Diabetes (HCC)    • GERD (gastroesophageal reflux disease)    • Hypertension    • Stroke (HCC)      Past Surgical History:   Procedure Laterality Date   • HYSTERECTOMY     • TOE AMPUTATION Left        SLP Recommendation and Plan                                                             Plan of Care Reviewed With: patient      SWALLOW EVALUATION (last 72 hours)     SLP Adult Swallow Evaluation     Row Name 21 0915 21 1410                Rehab Evaluation    Document Type therapy note (daily note)  -SM --       Subjective Information complains of  discomfort all over. RN notified  -SM --       Patient Observations lethargic; cooperative  -SM --       Patient Effort adequate  -SM --                Pain Scale: FACES Pre/Post-Treatment    Pain: FACES Scale, Pretreatment 4-->hurts little more  -SM --       Posttreatment Pain Rating 4-->hurts little more  -SM --                Clinical  Impression    Functional Impact -- risk of aspiration/pneumonia  -       Rehab Potential/Prognosis, Swallowing -- good, to achieve stated therapy goals  -       Swallow Criteria for Skilled Therapeutic Interventions Met -- demonstrates skilled criteria  -                SLP Treatment Clinical Impressions    Treatment Assessment (SLP) Showing concerns for pharyngeal dysphagia/aspiration. RN also noted coughing with liquids earlier this AM.  - Patient with prolonged mastication of regular solids and expectorated items she was unable to chew. DIet consistency changed to soft, chopped and thin liquids. SLP to follow up for diet tolerance. Patient continues with dysarthria. She participated well with tx this date.  -       Plan for Continued Treatment (SLP) further assessment needed (see comments)  [facility] [place]. Changed to Dysphagia Level 4 mec-soft diet and nectar-thick liquids until then. If any further concern from RN, to make NPO x meds.  - --       Care Plan Review evaluation/treatment results reviewed; patient/other agree to care plan  - --                Recommendations    Therapy Frequency (Swallow) -- PRN  -       SLP Diet Recommendation -- soft textures; chopped; thin liquids; other (see comments)  add gravy/sauce for moisture  -       Recommended Precautions and Strategies -- upright posture during/after eating; small bites of food and sips of liquid; check mouth frequently for oral residue/pocketing; general aspiration precautions; assist with feeding  -       Oral Care Recommendations -- Oral Care BID/PRN  -       SLP Rec. for Method of Medication Administration -- meds whole; with thin liquids; with pudding or applesauce; as tolerated  -       Monitor for Signs of Aspiration -- yes; notify SLP if any concerns  -             User Key  (r) = Recorded By, (t) = Taken By, (c) = Cosigned By    Initials Name Effective Dates    Evelina Rascon, MS CCC-SLP 06/16/21 -      David  MS Nataliia CCC-SLP 06/16/21 -                 EDUCATION  The patient has been educated in the following areas:   Communication Impairment Dysphagia (Swallowing Impairment) Modified Diet Instruction.        SLP GOALS     Row Name 12/03/21 0915 12/01/21 1410          Oral Nutrition/Hydration Goal 1 (SLP)    Oral Nutrition/Hydration Goal 1, SLP LTG: Pt will tolerate soft choppeddiet and thin liquids w/o s/s of aspiration or distress.  -SM LTG: Pt will tolerate soft choppeddiet and thin liquids w/o s/s of aspiration or distress.  -CH     Time Frame (Oral Nutrition/Hydration Goal 1, SLP) by discharge  -SM by discharge  -CH     Barriers (Oral Nutrition/Hydration Goal 1, SLP) -- Unable to sufficiently masticate regular solids and had to expectorate. DIet changed to soft, chopped and thin liquids. Goal modified to reflect this.  -CH     Progress/Outcomes (Oral Nutrition/Hydration Goal 1, SLP) continuing progress toward goal  -SM continuing progress toward goal  -CH            Oral Nutrition/Hydration Goal 2 (SLP)    Oral Nutrition/Hydration Goal 2, SLP Pt will tolerate trials of soft solids and thin liquids w/o s/s of aspiration w/ 100% acc and no cues.  -SM Pt will tolerate trials of soft solids and thin liquids w/o s/s of aspiration w/ 100% acc and no cues.  -CH     Time Frame (Oral Nutrition/Hydration Goal 2, SLP) short term goal (STG)  -SM short term goal (STG)  -CH     Barriers (Oral Nutrition/Hydration Goal 2, SLP) Lethargy partially impacting issues with solid. Multiple swallows with thin liquids and delayed cough. No s/s aspiration with nectar-thick liquid trials.  -SM Unable to sufficiently masticate regular solids and had to expectorate. DIet changed to soft, chopped and thin liquids. Goal modified to reflect this.  -CH     Progress/Outcomes (Oral Nutrition/Hydration Goal 2, SLP) continuing progress toward goal  -SM continuing progress toward goal  -CH            Reduce Perception of Hypernasality Goal 1 (SLP)     Reduce Perception of Hypernasality By Goal 1 (SLP) opening mouth wider for speech; 80%; with minimal cues (75-90%)  -SM opening mouth wider for speech; 80%; with minimal cues (75-90%)  -CH     Time Frame (Perception of Hypernasality Goal 1, SLP) short term goal (STG)  -SM short term goal (STG)  -CH     Progress (Perception of Hypernasality Goal 1, SLP) -- 60%; with moderate cues (50-74%)  -CH     Progress/Outcomes (Perception of Hypernasality Goal 1, SLP) goal ongoing  -SM continuing progress toward goal  -CH     Comment (Perception of Hypernasality Goal 1, SLP) Attempted. Could not sustain alertness needed to successfully target goal  -SM --            Articulation Goal 1 (SLP)    Improve Articulation Goal 1 (SLP) by over-articulating at word level; by over-articulating at phrase level; by over-articulating in connected speech; 80%; with minimal cues (75-90%)  -SM by over-articulating at word level; by over-articulating at phrase level; by over-articulating in connected speech; 80%; with minimal cues (75-90%)  -CH     Time Frame (Articulation Goal 1, SLP) short term goal (STG)  -SM short term goal (STG)  -CH     Progress (Articulation Goal 1, SLP) -- 70%; with minimal cues (75-90%)  -CH     Progress/Outcomes (Articulation Goal 1, SLP) goal ongoing  -SM continuing progress toward goal  -CH            Additional Goal 1 (SLP)    Additional Goal 1, SLP LTG: Pt will improve speech intelligibility in order to be able to increase participation in conversations and care at this level.  -SM LTG: Pt will improve speech intelligibility in order to be able to increase participation in conversations and care at this level.  -CH     Time Frame (Additional Goal 1, SLP) by discharge  -SM by discharge  -CH     Barriers (Additional Goal 1, SLP) Speech intelligibility 90% with unfamiliar listener in quiet environment. Obvious nasality and imprecise articulation noted.  -SM --     Progress/Outcomes (Additional Goal 1, SLP) continuing  progress toward goal  -SM continuing progress toward goal  -CH           User Key  (r) = Recorded By, (t) = Taken By, (c) = Cosigned By    Initials Name Provider Type    Evelina Rascon MS CCC-SLP Speech and Language Pathologist    Nataliia Salgado MS CCC-SLP Speech and Language Pathologist                   Time Calculation:    Time Calculation- SLP     Row Name 12/03/21 1028             Time Calculation- SLP    SLP Start Time 0915  -      SLP Received On 12/03/21  -              Untimed Charges    27759-RT Treatment/ST Modification Prosth Aug Alter  10  -SM      65530-AN Treatment Swallow Minutes 28  -SM              Total Minutes    Untimed Charges Total Minutes 38  -SM       Total Minutes 38  -SM            User Key  (r) = Recorded By, (t) = Taken By, (c) = Cosigned By    Initials Name Provider Type    Evelina Rascon MS CCC-SLP Speech and Language Pathologist                Therapy Charges for Today     Code Description Service Date Service Provider Modifiers Qty    55337197266 HC ST TREATMENT SPEECH 1 12/3/2021 Evelina Bain MS CCC-SLP GN 1    44800137492 HC ST TREATMENT SWALLOW 2 12/3/2021 Evelina Bain MS CCC-SLP GN 1            Patient was not wearing a face mask and did exhibit coughing during this therapy encounter.  Procedure performed was aerosolizing, involved close contact (within 6 feet for at least 15 minutes or longer), and did not involve contact with infectious secretions or specimens.  Therapist used appropriate personal protective equipment including gloves, standard procedure mask, eye protection, gown and N95 mask.  Appropriate PPE was worn during the entire therapy session.  Hand hygiene was completed before and after therapy session.       MS ELIER TaylorSLP  12/3/2021

## 2021-12-03 NOTE — PLAN OF CARE
Goal Outcome Evaluation:  Plan of Care Reviewed With: patient            SLP treatment completed. Showing increased concerns for aspiration. Adjusted to nectar-thick liquids, MBS to follow this PM. Please see note for further details and recommendations.

## 2021-12-03 NOTE — CASE MANAGEMENT/SOCIAL WORK
Continued Stay Note  [facility]     Patient Name: Otilia Givens  MRN: [id number]  Today's Date: 12/3/2021    Admit Date: 11/29/2021     Discharge Plan     Row Name 12/03/21 1417       Plan    Plan update    Patient/Family in Agreement with Plan yes    Plan Comments Called [facility] and left voicemail for Sandhya in admission and left voicemail with name, title and callback number.  CM following.  Patient plan is to discharge to [facility] via ambulance scheduled for 12/9 at 0900 pending bed offer.    Final Discharge Disposition Code 03 - skilled nursing facility (SNF)               Discharge Codes    No documentation.               Expected Discharge Date and Time     Expected Discharge Date Expected Discharge Time    Dec 9, 2021             Elaina Madrid RN

## 2021-12-03 NOTE — PLAN OF CARE
Goal Outcome Evaluation:  Plan of Care Reviewed With: patient        Progress: declining  Outcome Summary: A&O, 2lnc placed while sleeping, very anxious restless called daughter which helped patient calm down, repeated requests to help her be able to move her right side eduacted on cva symptoms, afib converted to sr/sb pvc, mbs passed this afternoon, needs to be a feeder unable to feed self, bmp in am, patient very tearful at times

## 2021-12-03 NOTE — PROGRESS NOTES
Patient is on Apixaban.  Education provided on 12/3/21 verbally and in writing.  Information provided includes effects of medication, drug-drug and drug-food interactions, and signs/symptoms of bleeding and clotting.  Patient verbalized understanding through teach back.  All pertinent questions were answered.     Thank you  Michelle Grayson, Pharmacy Intern  12/3/2021  11:36 EST

## 2021-12-03 NOTE — DISCHARGE INSTR - DIET
MBS/VFSS Reason for Referral 12/3/2021  Patient was referred for a MBS to assess the efficiency of his/her swallow function, rule out aspiration and make recommendations regarding safe dietary consistencies, effective compensatory strategies, and safe eating environment.             Recommendations/Treatment  SLP Swallowing Diagnosis: mild-moderate, oral dysphagia, functional pharyngeal phase (12/03/21 1545)  Functional Impact: risk of aspiration/pneumonia (12/03/21 1545)  Rehab Potential/Prognosis, Swallowing: good, to achieve stated therapy goals (12/03/21 1545)  Swallow Criteria for Skilled Therapeutic Interventions Met: demonstrates skilled criteria (12/03/21 1545)  Therapy Frequency (Swallow): 3 days per week (12/03/21 1545)  Predicted Duration Therapy Intervention (Days): until discharge (12/03/21 1545)  SLP Diet Recommendation: soft textures, chopped, thin liquids (12/03/21 1545)  Recommended Precautions and Strategies: upright posture during/after eating, general aspiration precautions, check mouth frequently for oral residue/pocketing (12/03/21 1545)  SLP Rec. for Method of Medication Administration: meds whole, with thin liquids, meds crushed, with pudding or applesauce, as tolerated (12/03/21 1545)  Monitor for Signs of Aspiration: yes, notify SLP if any concerns (12/03/21 1545)  Anticipated Discharge Disposition (SLP): anticipate therapy at next level of care (12/03/21 1545)    Instrumental Set-up  Utensils Used: spoon, cup, straw (12/03/21 1545)  Consistencies Trialed: thin liquids, pudding thick, regular textures (12/03/21 1545)    Oral Preparation/ Oral Phase  Oral Prep Phase: impaired oral phase of swallowing (12/03/21 1545)  Oral Transit Phase: impaired (12/03/21 1545)  Oral Residue: impaired (12/03/21 1545)  Oral Preparatory Phase: prolonged manipulation, anterior loss (12/03/21 1545)  Anterior Loss: thin liquids, secondary to reduced labial seal, other (see comments) (only w/ cup sips) (12/03/21  1545)  Prolonged Manipulation: regular textures, secondary to reduced lingual strength, other (see comments) (also affected by edentition) (12/03/21 1545)  Impaired Oral Transit Phase: piecemeal oral transit, premature spillage of liquids into pharynx (12/03/21 1545)  Piecemeal Oral Transit: all consistencies tested, discoordination of lingual movement (12/03/21 1545)  Premature Spillage of Liquids into Pharynx: thin liquids, secondary to reduced lingual control (12/03/21 1545)  Impaired Oral Residue: lingual residue (12/03/21 1545)  Lingual Residue: all consistencies tested, secondary to reduced lingual strength (12/03/21 1545)  Response to Oral Residue: cleared residue, with spontaneous subsequent swallow (12/03/21 1545)  Oral Residue, Comment: Mild-mod amount (12/03/21 1545)    Pharyngeal Phase  Initiation of Pharyngeal Swallow: WFL, bolus in valleculae (12/03/21 1545)  Pharyngeal Phase: functional pharyngeal phase of swallowing (12/03/21 1545)  Penetration During the Swallow: thin liquids (12/03/21 1545)  Response to Penetration: shallow, transient, other (see comments) (very shallow/trace; intermittent) (12/03/21 1545)  Pharyngeal Residue: other (see comments) (no significant pharyngeal residue) (12/03/21 1545)  Pharyngeal Phase, Comment: Grossly WFL. Functional delay in swallow initiation. Good airway protection. No aspiration w/ any consistency--even when pushed w/ consecutive drinks of thin liquid via straw. Spontaneously tucked chin as swallowed during study. (12/03/21 1545)    Cervical Esophageal Phase

## 2021-12-03 NOTE — CASE MANAGEMENT/SOCIAL WORK
Continued Stay Note  [facility]     Patient Name: Otilia Givens  MRN: [id number]  Today's Date: 12/3/2021    Admit Date: 11/29/2021     Discharge Plan     Row Name 12/03/21 1439       Plan    Plan update    Patient/Family in Agreement with Plan yes    Plan Comments Received a call from Sandhya with [facility] who advises they cannot promise a bed this far in advance but do want to accept her pending availability.  She will reach out next week and confirm.  CM following.    Final Discharge Disposition Code 03 - skilled nursing facility (SNF)    Row Name 12/03/21 1417       Plan    Plan update    Patient/Family in Agreement with Plan yes    Plan Comments Called [facility] and left voicemail for Sandhya in admission and left voicemail with name, title and callback number.  CM following.  Patient plan is to discharge to [facility] via ambulance scheduled for 12/9 at 0900 pending bed offer.    Final Discharge Disposition Code 03 - skilled nursing facility (SNF)               Discharge Codes    No documentation.               Expected Discharge Date and Time     Expected Discharge Date Expected Discharge Time    Dec 9, 2021             Elaina Madrid, RN

## 2021-12-03 NOTE — THERAPY TREATMENT NOTE
Patient Name: Otilia Givens  : 1950    MRN: [id number]                              Today's Date: 12/3/2021       Admit Date: 2021    Visit Dx:     ICD-10-CM ICD-9-CM   1. Dysarthria  R47.1 784.51   2. Dysphagia, unspecified type  R13.10 787.20   3. COVID-19 virus detected  U07.1 079.89     Patient Active Problem List   Diagnosis   • Paroxysmal atrial fibrillation (HCC)   • COVID-19 virus detected   • Right sided weakness   • T2DM (type 2 diabetes mellitus) (HCC)   • Essential hypertension   • Personal history of transient ischemic attack (TIA), and cerebral infarction without residual deficits   • Acute on chronic diastolic CHF (congestive heart failure) (HCC)   • History of MRSA infection   • Coronary artery disease involving native coronary artery of native heart   • Suspected subacute cerebrovascular accident (CVA)   • Elevated serum creatinine     Past Medical History:   Diagnosis Date   • Afib (HCC)    • CHF (congestive heart failure) (HCC)    • Diabetes (HCC)    • GERD (gastroesophageal reflux disease)    • Hypertension    • Stroke (HCC)      Past Surgical History:   Procedure Laterality Date   • HYSTERECTOMY     • TOE AMPUTATION Left       General Information     Row Name 21 1144          OT Time and Intention    Document Type therapy note (daily note)  -MR     Mode of Treatment occupational therapy  -MR     Row Name 21 1144          General Information    Patient Profile Reviewed yes  -MR     Existing Precautions/Restrictions fall; other (see comments)  R sided weakness  -MR     Barriers to Rehab medically complex  -MR     Row Name 21 1144          Cognition    Orientation Status (Cognition) oriented x 3  -MR     Row Name 21 1144          Safety Issues, Functional Mobility    Safety Issues Affecting Function (Mobility) safety precaution awareness; safety precautions follow-through/compliance; sequencing abilities  -MR     Impairments Affecting Function (Mobility) balance;  coordination; endurance/activity tolerance; motor planning; motor control; strength; postural/trunk control; range of motion (ROM); visual/perceptual  -           User Key  (r) = Recorded By, (t) = Taken By, (c) = Cosigned By    Initials Name Provider Type    Candace Noyola, OT Occupational Therapist               Lymphedema     Row Name 12/02/21 1135             Subjective Pain    Able to rate subjective pain? yes  -      Recorded by [] Derik Ponce, PT              Lymphedema Edema Assessment    Ptting Edema Category By severity  -      Pitting Edema Mild  -      Recorded by [] Derik Ponce, PT              Skin Changes/Observations    Location/Assessment Lower Extremity  -      Lower Extremity Conditions bilateral:; dry; scaly; crust; fragile  -      Lower Extremity Color/Pigment bilateral:; erythema  -      Recorded by [] Derik Ponce, PT              Compression/Skin Care    Compression/Skin Care skin care; wrapping location  -      Skin Care washed/dried; lotion applied  -      Wrapping Location lower extremity  -      Wrapping Location LE bilateral:; foot to knee  -      Wrapping Comments BLE size 4/5 MLW applied doubled and overlapped for gradient compression.  -      Recorded by [] Derik Ponce, PT            User Key  (r) = Recorded By, (t) = Taken By, (c) = Cosigned By    Initials Name Effective Dates     Derik Ponce, PT 09/21/21 -                Mobility/ADL's     Row Name 12/03/21 1144          Bed Mobility    Comment (Bed Mobility) Received [facility]  -     Row Name 12/03/21 1144          Activities of Daily Living    BADL Assessment/Intervention grooming  -     Row Name 12/03/21 1144          Grooming Assessment/Training    [place] Level (Grooming) wash face, hands; set up; hair care, combing/brushing; maximum assist (25% patient effort)  -     Position (Grooming) supported sitting  -           User Key  (r) = Recorded By, (t) = Taken  By, (c) = Cosigned By    Initials Name Provider Type    Candace Noyola OT Occupational Therapist               Obj/Interventions     Row Name 12/03/21 1147          Shoulder (Therapeutic Exercise)    Shoulder (Therapeutic Exercise) PROM (passive range of motion)  -MR     Shoulder AROM (Therapeutic Exercise) left; flexion; extension; aBduction; aDduction; external rotation; internal rotation; scapular protraction; scapular retraction; sitting; 10 repetitions  -MR     Shoulder PROM (Therapeutic Exercise) right; flexion; external rotation; internal rotation; horizontal aBduction/aDduction; scapular protraction; scapular retraction; sitting; 10 repetitions  Flexion maintained > 90 degrees  -MR     Row Name 12/03/21 1147          Elbow/Forearm (Therapeutic Exercise)    Elbow/Forearm (Therapeutic Exercise) AROM (active range of motion)  -MR     Elbow/Forearm AROM (Therapeutic Exercise) left; flexion; extension; supination; pronation; sitting; 10 repetitions  -MR     Elbow/Forearm PROM (Therapeutic Exercise) flexion; extension; supination; pronation; sitting; 10 repetitions; right  -MR     Row Name 12/03/21 1147          Wrist (Therapeutic Exercise)    Wrist (Therapeutic Exercise) PROM (passive range of motion)  -MR     Wrist PROM (Therapeutic Exercise) right; flexion; extension; radial deviation; ulnar deviation; 10 repetitions  -MR     Row Name 12/03/21 1147          Balance    Balance Assessment sitting static balance  -MR     Static Sitting Balance mild impairment; sitting in chair  -MR     Balance Interventions sitting  -MR     Row Name 12/03/21 1147          Therapeutic Exercise    Therapeutic Exercise shoulder; elbow/forearm; wrist  -MR           User Key  (r) = Recorded By, (t) = Taken By, (c) = Cosigned By    Initials Name Provider Type     Candace Edmonds OT Occupational Therapist               Goals/Plan    No documentation.                Clinical Impression     Row Name 12/03/21 1150          Pain  Assessment    Additional Documentation Pain Scale: Numbers Pre/Post-Treatment (Group)  -MR     Row Name 12/03/21 1150          Pain Scale: Numbers Pre/Post-Treatment    Pretreatment Pain Rating 0/10 - no pain  -MR     Posttreatment Pain Rating 0/10 - no pain  -MR     Row Name 12/03/21 1150          Plan of Care Review    Plan of Care Reviewed With patient  -MR     Progress no change  -MR     Outcome Summary Pt tearful upon OT arrival about deficits present from stroke. SUA for grooming while supported in chair. Pt tolerated AROM w/ LUE and PROM w/ RUE x 10 reps in functional planes. Continue to progress as able per current POC. Recommendation upon d/c for SNF, will monitor progress closely.  -MR     Row Name 12/03/21 1150          Therapy Plan Review/Discharge Plan (OT)    Anticipated Discharge Disposition (OT) skilled nursing facility  -     Row Name 12/03/21 1150          Vital Signs    Pre Systolic BP Rehab 138  -MR     Pre Treatment Diastolic BP 75  -MR     Pretreatment Heart Rate (beats/min) 81  -MR     Pre SpO2 (%) 91  -MR     O2 Delivery Pre Treatment room air  -MR     O2 Delivery Intra Treatment room air  -MR     O2 Delivery Post Treatment room air  -MR     Pre Patient Position Sitting  -MR     Intra Patient Position Sitting  -MR     Post Patient Position Sitting  -MR     Row Name 12/03/21 1150          Positioning and Restraints    Pre-Treatment Position sitting in chair/recliner  -MR     Post Treatment Position chair  -MR     In Chair notified nsg; call light within reach; encouraged to call for assist; exit alarm on; waffle cushion; on mechanical lift sling; RUE elevated; LUE elevated; legs elevated; sitting  w/in RN view  -MR           User Key  (r) = Recorded By, (t) = Taken By, (c) = Cosigned By    Initials Name Provider Type    MR Candace Edmonds, OT Occupational Therapist               Outcome Measures     Row Name 12/03/21 1153          How much help from another is currently needed...    Putting  on and taking off regular lower body clothing? 1  -MR     Bathing (including washing, rinsing, and drying) 1  -MR     Toileting (which includes using toilet bed pan or urinal) 1  -MR     Putting on and taking off regular upper body clothing 1  -MR     Taking care of personal grooming (such as brushing teeth) 2  -MR     Eating meals 3  -MR     AM-PAC 6 Clicks Score (OT) 9  -MR     Row Name 12/03/21 0800          How much help from another person do you currently need...    Turning from your back to your side while in flat bed without using bedrails? 2  -CH     Moving from lying on back to sitting on the side of a flat bed without bedrails? 1  -CH     Moving to and from a bed to a chair (including a wheelchair)? 1  -CH     Standing up from a chair using your arms (e.g., wheelchair, bedside chair)? 1  -CH     Climbing 3-5 steps with a railing? 1  -CH     To walk in hospital room? 1  -CH     AM-PAC 6 Clicks Score (PT) 7  -     Row Name 12/03/21 1153          Functional Assessment    Outcome Measure Options AM-PAC 6 Clicks Daily Activity (OT)  -MR           User Key  (r) = Recorded By, (t) = Taken By, (c) = Cosigned By    Initials Name Provider Type     Adriana Chavez RN Registered Nurse     Candace Edmonds, OT Occupational Therapist                Occupational Therapy Education                 Title: PT OT SLP Therapies (In Progress)     Topic: Occupational Therapy (In Progress)     Point: ADL training (In Progress)     Description:   Instruct learner(s) on proper safety adaptation and remediation techniques during self care or transfers.   Instruct in proper use of assistive devices.              Learning Progress Summary           Patient Acceptance, E, NL,NR by  at 12/3/2021 1154    Nonacceptance, E,TB, NR,NL by  at 12/3/2021 1049    Acceptance, E,D, NR by ALICIA at 12/1/2021 1315    Comment: noted changes in strength, ROM and sensation, RUE TE, grooming initiation    Acceptance, E,D, VU,NR by ALICIA at  11/29/2021 1103    Comment: reason for consult, noted deficit, UE TE, but mobility                   Point: Home exercise program (In Progress)     Description:   Instruct learner(s) on appropriate technique for monitoring, assisting and/or progressing therapeutic exercises/activities.              Learning Progress Summary           Patient Acceptance, E, NL,NR by MR at 12/3/2021 1154    Nonacceptance, E,TB, NR,NL by  at 12/3/2021 1049    Acceptance, E,D, NR by  at 12/1/2021 1315    Comment: noted changes in strength, ROM and sensation, RUE TE, grooming initiation    Acceptance, E,D, VU,NR by  at 11/29/2021 1103    Comment: reason for consult, noted deficit, UE TE, but mobility                   Point: Precautions (In Progress)     Description:   Instruct learner(s) on prescribed precautions during self-care and functional transfers.              Learning Progress Summary           Patient Acceptance, E, NL,NR by MR at 12/3/2021 1154    Nonacceptance, E,TB, NR,NL by  at 12/3/2021 1049                   Point: Body mechanics (In Progress)     Description:   Instruct learner(s) on proper positioning and spine alignment during self-care, functional mobility activities and/or exercises.              Learning Progress Summary           Patient Acceptance, E, NL,NR by MR at 12/3/2021 1154    Nonacceptance, E,TB, NR,NL by  at 12/3/2021 1049    Acceptance, E,D, NR by  at 12/1/2021 1315    Comment: noted changes in strength, ROM and sensation, RUE TE, grooming initiation    Acceptance, E,D, VU,NR by  at 11/29/2021 1103    Comment: reason for consult, noted deficit, UE TE, but mobility                               User Key     Initials Effective Dates Name Provider Type Discipline     06/16/21 -  Samaria Mccabe, OT Occupational Therapist OT     06/16/21 -  Adriana Chavez, RN Registered Nurse Nurse     10/06/21 -  Candace Edmonds OT Occupational Therapist OT              OT Recommendation and Plan      Plan of Care Review  Plan of Care Reviewed With: patient  Progress: no change  Outcome Summary: Pt tearful upon OT arrival about deficits present from stroke. SUA for grooming while supported in chair. Pt tolerated AROM w/ LUE and PROM w/ RUE x 10 reps in functional planes. Continue to progress as able per current POC. Recommendation upon d/c for SNF, will monitor progress closely.     Time Calculation:    Time Calculation- OT     Row Name 12/03/21 1154             Time Calculation- OT    OT Start Time 1007  -MR      OT Received On 12/03/21  -MR      OT Goal Re-Cert Due Date 12/09/21  -MR              Timed Charges    76419 - OT Therapeutic Exercise Minutes 20  -MR      79092 - OT Self Care/Mgmt Minutes 3  -MR              Total Minutes    Timed Charges Total Minutes 23  -MR       Total Minutes 23  -MR            User Key  (r) = Recorded By, (t) = Taken By, (c) = Cosigned By    Initials Name Provider Type    Cristela Noyola OT Occupational Therapist              Therapy Charges for Today     Code Description Service Date Service Provider Modifiers Qty    06993506457 HC OT THER PROC EA 15 MIN 12/3/2021 Cristela Edmonds OT GO 2               CRISTELA EDMONDS OT  12/3/2021

## 2021-12-04 NOTE — PLAN OF CARE
Goal Outcome Evaluation:     Pt is oriented, but has occasional episodes of moaning and yelling- easily reoriented. She seems to be forgetful; frequently asks for the same things over and over despite previous interventions. Otherwise stable, Sats >90% on 2L. NSR with occasional runs of A-fib rate controlled with stable SBP. Pt frequently repositioned due to multiple skin issues. Will continue to monitor.

## 2021-12-04 NOTE — PLAN OF CARE
Goal Outcome Evaluation:  Plan of Care Reviewed With: patient        Progress: improving  Outcome Summary: Pt continuing to demonstrate good improvements in BLE edema and erythema. Pt with mild to trace BLE edema remaining at this time. PT plans to trial removing MLW next session. PT able to debride moderate amount of slough from pt's L posterior ankle wound along with BLE dry, flaking tissue. PT plans to f/u with pt in 2-3 days.

## 2021-12-04 NOTE — THERAPY WOUND CARE TREATMENT
Acute Care - Wound/Debridement Treatment Note  [facility]     Patient Name: Otilia Givens  : 1950  MRN: [id number]  Today's Date: 2021                Admit Date: 2021    Visit Dx:    ICD-10-CM ICD-9-CM   1. Dysarthria  R47.1 784.51   2. Oral phase dysphagia  R13.11 787.21   3. COVID-19 virus detected  U07.1 079.89       Patient Active Problem List   Diagnosis   • Paroxysmal atrial fibrillation (HCC)   • COVID-19 virus detected   • Right sided weakness   • T2DM (type 2 diabetes mellitus) (HCC)   • Essential hypertension   • Personal history of transient ischemic attack (TIA), and cerebral infarction without residual deficits   • Acute on chronic diastolic CHF (congestive heart failure) (HCC)   • History of MRSA infection   • Coronary artery disease involving native coronary artery of native heart   • Suspected subacute cerebrovascular accident (CVA)   • Elevated serum creatinine        Past Medical History:   Diagnosis Date   • Afib (HCC)    • CHF (congestive heart failure) (HCC)    • Diabetes (HCC)    • GERD (gastroesophageal reflux disease)    • Hypertension    • Stroke (HCC)         Past Surgical History:   Procedure Laterality Date   • HYSTERECTOMY     • TOE AMPUTATION Left            Rash 21 0840 groin patch (Active)   Care, Rash open to air 21       Wound 21 0317 medial pubis (Active)   Dressing Appearance open to air 21 0800   Base red; dry 21 08   Periwound intact; moist; yeast; warm; pink 21 0800   Periwound Temperature warm 21 08   Drainage Amount none 21 0800   Care, Wound cleansed with; soap and water 21 08   Dressing Care open to air 21 0800       Wound 21 0319 Left anterior third toe Abrasion (Active)   Dressing Appearance open to air 21 08   Closure Open to air 21 0800   Base scab 21   Drainage Amount none 21   Dressing Care open to air 21 0800       Wound 21  0319 Left anterior fifth toe Abrasion (Active)   Dressing Appearance open to air 12/04/21 0800   Closure Open to air 12/03/21 2016   Base clean; dry; scab 12/03/21 2016   Drainage Amount none 12/04/21 0800   Dressing Care open to air 12/04/21 0800       Wound 11/29/21 0320 Left lateral groin MASD (Moisture associated skin damage) (Active)   Dressing Appearance open to air 12/04/21 0800   Closure Open to air 12/03/21 2016   Base clean; pink; moist 12/03/21 2016   Periwound intact; dry; pink 12/03/21 2016   Drainage Amount none 12/03/21 2016   Care, Wound cleansed with; soap and water 12/04/21 0800   Dressing Care open to air 12/04/21 0800   Periwound Care cleansed with pH balanced cleanser; dry periwound area maintained 12/03/21 2016       Wound 12/02/21 1135 Right posterior leg Blisters (Active)   Dressing Appearance dry; intact 12/04/21 1405   Closure CHAPITO 12/04/21 0800   Base moist; yellow; slough; pink; red 12/04/21 1405   Periwound intact; redness; pink; swelling 12/04/21 1405   Periwound Temperature warm 12/04/21 1405   Periwound Skin Turgor soft 12/04/21 1405   Edges irregular; open 12/04/21 1405   Drainage Amount none 12/04/21 1405   Care, Wound cleansed with; soap and water; debrided 12/04/21 1405   Dressing Care petroleum-based; gauze; low-adherent; foam 12/04/21 1405   Periwound Care dry periwound area maintained; cleansed with pH balanced cleanser 12/04/21 1405      Lymphedema     Row Name 12/04/21 1405             Subjective Pain    Able to rate subjective pain? no  -              Lymphedema Edema Assessment    Ptting Edema Category By severity  -      Pitting Edema Mild; Other (comment)  Mild to trace  -              Skin Changes/Observations    Location/Assessment Lower Extremity  -      Lower Extremity Conditions bilateral:; clean; dry; hairless; crust  -      Lower Extremity Color/Pigment bilateral:; erythema  -LH              Compression/Skin Care    Compression/Skin Care skin care;  wrapping location  -      Skin Care washed/dried; lotion applied  -      Wrapping Location lower extremity  -      Wrapping Location LE bilateral:; foot to knee  -      Wrapping Comments BLE size 4/5 compressogrip applied doubled and overlapped for gradient compression.  -            User Key  (r) = Recorded By, (t) = Taken By, (c) = Cosigned By    Initials Name Provider Type     Derik Ponce, PT Physical Therapist                WOUND DEBRIDEMENT  Total area of Debridement: ~6cm2  Debridement Site 1  Location- Site 1: BLE  Selective Debridement- Site 1: Wound Surface <20cmsq  Instruments- Site 1: tweezers  Excised Tissue Description- Site 1: moderate, slough, other (comment) (Dry, flaking crust)  Bleeding- Site 1: none               PT Assessment (last 12 hours)     PT Evaluation and Treatment     Row Name 12/04/21 1405          Physical Therapy Time and Intention    Subjective Information complains of; pain; weakness  -     Document Type therapy note (daily note); wound care  -     Mode of Treatment physical therapy; individual therapy  -     Row Name 12/04/21 4481          Cognition    Affect/Mental Status (Cognitive) confused  -     Orientation Status (Cognition) oriented to; person; place; verbal cues/prompts needed for orientation  -     Row Name 12/04/21 9746          Pain    Additional Documentation Pain Scale: FACES Pre/Post-Treatment (Group)  -     Row Name 12/04/21 8155          Pain Scale: Word Pre/Post-Treatment    Pain Intervention(s) Repositioned; Rest  -     Row Name 12/04/21 1404          Pain Scale: FACES Pre/Post-Treatment    Pain: FACES Scale, Pretreatment 2-->hurts little bit  -     Posttreatment Pain Rating 2-->hurts little bit  -     Pain Location - Side Bilateral  -     Pain Location - Orientation lower  -     Pain Location extremity  -     Row Name             Wound 11/29/21 0317 medial pubis    Wound - Properties Group Placement Date: 11/29/21  -  Placement Time: 0317  -BM Present on Hospital Admission: Y  -BM Orientation: medial  -BM Location: pubis  -BM     Retired Wound - Properties Group Date first assessed: 11/29/21  -BM Time first assessed: 0317  -BM Present on Hospital Admission: Y  -BM Location: pubis  -BM     Row Name             Wound 11/29/21 0319 Left anterior third toe Abrasion    Wound - Properties Group Placement Date: 11/29/21  -BM Placement Time: 0319  -BM Present on Hospital Admission: Y  -BM Side: Left  -BM Orientation: anterior  -BM Location: third toe  -BM Primary Wound Type: Abrasion  -BM     Retired Wound - Properties Group Date first assessed: 11/29/21  -BM Time first assessed: 0319  -BM Present on Hospital Admission: Y  -BM Side: Left  -BM Location: third toe  -BM Primary Wound Type: Abrasion  -BM     Row Name             Wound 11/29/21 0319 Left anterior fifth toe Abrasion    Wound - Properties Group Placement Date: 11/29/21  -BM Placement Time: 0319  -BM Present on Hospital Admission: Y  -BM Side: Left  -BM Orientation: anterior  -BM Location: fifth toe  -BM Primary Wound Type: Abrasion  -BM     Retired Wound - Properties Group Date first assessed: 11/29/21  -BM Time first assessed: 0319  -BM Present on Hospital Admission: Y  -BM Side: Left  -BM Location: fifth toe  -BM Primary Wound Type: Abrasion  -BM     Row Name             Wound 11/29/21 0320 Left lateral groin MASD (Moisture associated skin damage)    Wound - Properties Group Placement Date: 11/29/21  -BM Placement Time: 0320  -BM Present on Hospital Admission: Y  -BM Side: Left  -BM Orientation: lateral  -BM Location: groin  -BM Primary Wound Type: MASD  -BM     Retired Wound - Properties Group Date first assessed: 11/29/21  -BM Time first assessed: 0320  -BM Present on Hospital Admission: Y  -BM Side: Left  -BM Location: groin  -BM Primary Wound Type: MASD  -BM     Row Name 12/04/21 1405          Wound 12/02/21 1135 Right posterior leg Blisters    Wound - Properties Group  Placement Date: 12/02/21  - Placement Time: 1135  - Present on Hospital Admission: Y  - Side: Right  - Orientation: posterior  - Location: leg  - Primary Wound Type: Blisters  -     Dressing Appearance dry; intact  -     Base moist; yellow; slough; pink; red  -     Periwound intact; redness; pink; swelling  -     Periwound Temperature warm  -     Periwound Skin Turgor soft  -     Edges irregular; open  -     Drainage Amount none  -     Care, Wound cleansed with; soap and water; debrided  -     Dressing Care petroleum-based; gauze; low-adherent; foam  Xeroform. 4x4 Optifoam  -     Periwound Care dry periwound area maintained; cleansed with pH balanced cleanser  -     Retired Wound - Properties Group Date first assessed: 12/02/21  - Time first assessed: 1135  - Present on Hospital Admission: Y  -LH Side: Right  - Location: leg  - Primary Wound Type: Blisters  -     Row Name 12/04/21 1405          Coping    Observed Emotional State restless; repeated requests; tearful/crying  -     Verbalized Emotional State disbelief; hopelessness  -     Trust Relationship/Rapport care explained; questions answered  -     Row Name 12/04/21 1405          Plan of Care Review    Plan of Care Reviewed With patient  -     Progress improving  -     Outcome Summary Pt continuing to demonstrate good improvements in BLE edema and erythema. Pt with mild to trace BLE edema remaining at this time. PT plans to trial removing MLW next session. PT able to debride moderate amount of slough from pt's L posterior ankle wound along with BLE dry, flaking tissue. PT plans to f/u with pt in 2-3 days.  -     Row Name 12/04/21 1405          Positioning and Restraints    Pre-Treatment Position sitting in chair/recliner  -     Post Treatment Position chair  -     In Chair reclined; call light within reach; encouraged to call for assist; legs elevated; heels elevated  -           User Key  (r) =  Recorded By, (t) = Taken By, (c) = Cosigned By    Initials Name Provider Type     Kristi Brown, RN Registered Nurse    Derik Osborn, PT Physical Therapist              Physical Therapy Education                 Title: PT OT SLP Therapies (In Progress)     Topic: Physical Therapy (In Progress)     Point: Mobility training (In Progress)     Learning Progress Summary           Patient Nonacceptance, E,TB, NR,NL by  at 12/3/2021 1049    Acceptance, E, VU,NR by NS at 12/2/2021 1315    Acceptance, E, VU,NR by NS at 11/29/2021 1115                   Point: Home exercise program (In Progress)     Learning Progress Summary           Patient Nonacceptance, E,TB, NR,NL by  at 12/3/2021 1049    Acceptance, E, VU,NR by NS at 12/2/2021 1315    Acceptance, E, VU,NR by NS at 11/29/2021 1115                   Point: Body mechanics (In Progress)     Learning Progress Summary           Patient Nonacceptance, E,TB, NR,NL by  at 12/3/2021 1049    Acceptance, E, VU,NR by NS at 12/2/2021 1315    Acceptance, E, VU,NR by NS at 11/29/2021 1115                   Point: Precautions (In Progress)     Learning Progress Summary           Patient Nonacceptance, E,TB, NR,NL by  at 12/3/2021 1049    Acceptance, E, VU,NR by NS at 12/2/2021 1315    Acceptance, E, VU,NR by NS at 11/29/2021 1115                               User Key     Initials Effective Dates Name Provider Type Discipline     06/16/21 -  Adriana Chavez, RN Registered Nurse Nurse    NS 06/16/21 -  Isela Hathaway PT Physical Therapist PT                Recommendation and Plan  Anticipated Discharge Disposition (PT): skilled nursing facility  Planned Therapy Interventions (PT): wound care  Therapy Frequency (PT): daily  Plan of Care Reviewed With: patient   Progress: improving       Progress: improving  Outcome Summary: Pt continuing to demonstrate good improvements in BLE edema and erythema. Pt with mild to trace BLE edema remaining at this time. PT plans to  trial removing MLW next session. PT able to debride moderate amount of slough from pt's L posterior ankle wound along with BLE dry, flaking tissue. PT plans to f/u with pt in 2-3 days.  Plan of Care Reviewed With: patient            Time Calculation   PT Charges     Row Name 12/04/21 1540             Time Calculation    Start Time 1405  -      PT Goal Re-Cert Due Date 12/09/21  -              Untimed Charges    Wound Care 12146 Selective debridement; 55794 Multilayer comp below knee  -      46785-Aqjixjahkl comp below knee 10  -      76411-Hribklysx debridement 15  -LH              Total Minutes    Untimed Charges Total Minutes 25  -LH       Total Minutes 25  -LH            User Key  (r) = Recorded By, (t) = Taken By, (c) = Cosigned By    Initials Name Provider Type     Derik Ponce, PT Physical Therapist                  Therapy Charges for Today     Code Description Service Date Service Provider Modifiers Qty    [id number] HC BRITTANY DEBRIDE OPEN WOUND UP TO 20CM 12/4/2021 Derik Ponce, PT GP 1    [id number]  PT MULTI LAYER COMP SYS BELOW KNEE 12/4/2021 Derik Ponce, PT GP 1            PT G-Codes  Outcome Measure Options: AM-PAC 6 Clicks Daily Activity (OT)  AM-PAC 6 Clicks Score (PT): 7  AM-PAC 6 Clicks Score (OT): 9  Modified [place] Scale: 5 - Severe disability.  Bedridden, incontinent, and requiring constant nursing care and attention.       Derik Ponce PT  12/4/2021

## 2021-12-04 NOTE — PROGRESS NOTES
[facility] Medicine Services  PROGRESS NOTE    Patient Name: Otilia Givens  : 1950  MRN: [id number]    Date of Admission: 2021  Primary Care Physician: Shannon Aly MD    Subjective   Subjective     CC:  F/U CVA    HPI:  Sitting up in bed on exam. Receiving help with breakfast. No overnight issues reported. One small/ hard BM after additional lacutlose ordered yesterday. Breathing well. No change per RN    ROS:  Gen-no fevers, no chills  CV-no chest pain, no palpitations  Resp-no cough, no dyspnea  GI-no N/V/D, no abd pain    All other systems reviewed and negative except any additional pertinent positives and negatives as discussed in HPI.      Objective   Objective     Vital Signs:   Temp:  [96 °F (35.6 °C)-98.2 °F (36.8 °C)] 98.2 °F (36.8 °C)  Heart Rate:  [] 122  Resp:  [18-20] 20  BP: (137-154)/(66-95) 144/91  Flow (L/min):  [2] 2     Physical Exam:    Constitutional: No acute distress, sleeping, arouses to voice, chronically ill appearing  HENT: NCAT, mucous membranes moist  Respiratory: respiratory effort normal on RA  Cardiovascular: NSR on tele  Musculoskeletal: BLE leg wraps in place- BLE edema  Psychiatric: Appropriate affect, cooperative  Neurologic: Oriented x 3, Right sided weakness, Cranial Nerves grossly intact to confrontation, slurred speech  Skin: No rashes      Results Reviewed:  LAB RESULTS:      Lab 21  0527 21  0923 21  0903 21  0338 21  0337   WBC 4.67 5.45 5.24 3.86  --    HEMOGLOBIN 12.7 12.4 11.5* 12.4  --    HEMATOCRIT 40.3 38.7 35.1 37.9  --    PLATELETS 162 161 166 184  --    NEUTROS ABS  --   --   --  2.45  --    IMMATURE GRANS (ABS)  --   --   --  0.01  --    LYMPHS ABS  --   --   --  0.73  --    MONOS ABS  --   --   --  0.54  --    EOS ABS  --   --   --  0.12  --    MCV 86.1 85.1 83.0 83.5  --    CRP  --  3.21*  --  2.11*  --    PROCALCITONIN  --   --   --  0.18  --    LACTATE  --   --   --  1.5  --    LDH   --   --   --  200  --    PROTIME  --   --   --   --  13.2   APTT  --   --   --   --  35.6   D DIMER QUANT  --   --   --   --  1.08*         Lab 12/04/21  0533 12/02/21 0527 12/01/21 0923 11/30/21  0903 11/29/21  0338   SODIUM 139 137 133* 136 139   POTASSIUM 4.7 4.6 4.5 4.3 4.2   CHLORIDE 105 102 101 103 102   CO2 21.0* 21.0* 23.0 23.0 26.0   ANION GAP 13.0 14.0 9.0 10.0 11.0   BUN 43* 42* 38* 32* 31*   CREATININE 1.54* 1.73* 1.88* 1.58* 1.47*   GLUCOSE 198* 185* 182* 183* 236*   CALCIUM 8.5* 8.2* 8.1* 7.9* 8.6   MAGNESIUM  --   --   --  2.3 1.9   HEMOGLOBIN A1C  --   --   --   --  8.30*         Lab 12/01/21  0923 11/29/21  0338   TOTAL PROTEIN 6.5 6.9   ALBUMIN 2.90* 3.00*   GLOBULIN 3.6 3.9   ALT (SGPT) 11 14   AST (SGOT) 21 20   BILIRUBIN 0.6 0.6   ALK PHOS 104 108         Lab 11/29/21  1513 11/29/21  0338 11/29/21  0337   PROBNP  --  7,820.0*  --    TROPONIN T 0.027 0.036*  --    PROTIME  --   --  13.2   INR  --   --  1.03         Lab 11/29/21  0338   CHOLESTEROL 135   LDL CHOL 81   HDL CHOL 34*   TRIGLYCERIDES 105         Lab 11/29/21  0338   FERRITIN 457.20*         Brief Urine Lab Results  (Last result in the past 365 days)      Color   Clarity   Blood   Leuk Est   Nitrite   Protein   CREAT   Urine HCG        12/01/21 1738             130.3               Microbiology Results Abnormal     Procedure Component Value - Date/Time    Blood Culture - Blood, Hand, Right [[id number]]  (Normal) Collected: 11/29/21 0536    Lab Status: Final result Specimen: Blood from Hand, Right Updated: 12/04/21 0615     Blood Culture No growth at 5 days    Blood Culture - Blood, Arm, Right [[id number]]  (Normal) Collected: 11/29/21 0337    Lab Status: Final result Specimen: Blood from Arm, Right Updated: 12/04/21 0400     Blood Culture No growth at 5 days    Urine Culture - Urine, Urine, Clean Catch [[id number]] Collected: 11/29/21 0419    Lab Status: Final result Specimen: Urine, Clean Catch Updated: 11/30/21 1503     Urine Culture  50,000 CFU/mL Mixed Jacey Isolated    Narrative:      Specimen contains mixed organisms of questionable pathogenicity which indicates contamination with commensal jacey.  Further identification is unlikely to provide clinically useful information.  Suggest recollection.          FL Video Swallow With Speech Single Contrast    Result Date: 12/3/2021  EXAMINATION: FL VIDEO SWALLOW W SPEECH SINGLE-CONTRAST-  INDICATION: dysphagia; R47.1-Dysarthria and anarthria; R13.10-Dysphagia, unspecified; U07.1-COVID-19  TECHNIQUE: 36 seconds of fluoroscopic time was used for this exam. 6 associated fluoroscopic loops were saved. The patient was evaluated in the seated lateral position while taking a variety of consistencies of barium by mouth under the direction of speech pathology.  COMPARISON: NONE  FINDINGS: 36 seconds of fluoroscopy provided for a modified barium swallow. Please see speech therapy report for full details and recommendations.       Impression: Fluoroscopy provided for a modified barium swallow. Please see speech therapy report for full details and recommendations.          Results for orders placed during the hospital encounter of 11/29/21    Adult Transthoracic Echo Complete W/ Cont if Necessary Per Protocol (With Agitated Saline)    Interpretation Summary  · Left ventricular systolic function is normal. Estimated left ventricular EF = 52%.  · Left ventricular wall thickness is consistent with mild concentric hypertrophy.  · Mild to moderate mitral valve regurgitation is present with an eccentric jet noted.  · Mild to moderate tricuspid valve regurgitation is present.  · Estimated right ventricular systolic pressure from tricuspid regurgitation is moderately elevated (45-55 mmHg).  · Saline test results are negative for right to left atrial level shunt.      I have reviewed the medications:  Scheduled Meds:albuterol sulfate HFA, 2 puff, Inhalation, 4x Daily - RT  apixaban, 5 mg, Oral, Q12H  aspirin, 81 mg,  Oral, Daily  atorvastatin, 80 mg, Oral, Nightly  bisacodyl, 10 mg, Rectal, Daily   And  senna-docusate sodium, 2 tablet, Oral, BID   And  [START ON 12/5/2021] polyethylene glycol, 17 g, Oral, Daily  furosemide, 20 mg, Intravenous, Once  hydrALAZINE, 25 mg, Oral, TID  insulin detemir, 5 Units, Subcutaneous, Daily  insulin lispro, 0-7 Units, Subcutaneous, TID AC  metoprolol tartrate, 50 mg, Oral, Q12H  miconazole, , Topical, Q12H  nystatin, , Topical, Q12H  pantoprazole, 40 mg, Oral, Q AM  sodium chloride, 10 mL, Intravenous, Q12H  sodium chloride, 10 mL, Intravenous, Q12H      Continuous Infusions:   PRN Meds:.•  acetaminophen **OR** acetaminophen **OR** acetaminophen  •  benzonatate  •  senna-docusate sodium **AND** [START ON 12/5/2021] polyethylene glycol **AND** bisacodyl **AND** bisacodyl  •  dextrose  •  dextrose  •  glucagon (human recombinant)  •  magnesium sulfate **OR** magnesium sulfate **OR** magnesium sulfate  •  metoprolol tartrate  •  ondansetron **OR** ondansetron  •  potassium chloride **OR** potassium chloride **OR** potassium chloride  •  senna-docusate sodium **AND** [DISCONTINUED] polyethylene glycol **AND** [DISCONTINUED] bisacodyl **AND** [DISCONTINUED] bisacodyl  •  sodium chloride  •  sodium chloride    Assessment/Plan   Assessment & Plan     Active Hospital Problems    Diagnosis  POA   • **Suspected subacute cerebrovascular accident (CVA) [R09.89]  Yes   • Elevated serum creatinine [R79.89]  Yes   • Paroxysmal atrial fibrillation (HCC) [I48.0]  Yes   • COVID-19 virus detected [U07.1]  Yes   • Right sided weakness [R53.1]  Yes   • T2DM (type 2 diabetes mellitus) ([facility]) [E11.9]  Yes   • Essential hypertension [I10]  Yes   • Acute on chronic diastolic CHF (congestive heart failure) (HCC) [I50.33]  Yes   • History of MRSA infection [Z86.14]  Yes      Resolved Hospital Problems   No resolved problems to display.        Brief Hospital Course to date:  Otilia Givens is a 71 y.o. female with hx of HTN,  DM2, PAF, GERD, TIA, and chronic venous stasis with hx of MRSA leg wounds who presents from OSH due to right sided weakness and slurred speech. Also complained of a mild cough. Apparently her right sided weakness has been going on for 2 weeks. At the OSH, CT head was negative, NIH was 6. COVID-19 was positive. Transferred to [facility] for Neurology evaluation.    This patient's problems and plans were partially entered by my partner and updated as appropriate by me 12/04/21.      Right sided weakness and slurred speech  Probable subacute CVA  Hx of TIA  --CT head and CT perfusion negative. CTA head/neck showed moderate to high-grade short segment stenosis in the proximal right P2 segment, mild stenosis of proximal left posterior M2 branch.  --Has hx of recent diagnosis of Afib and has been taking Plavix.  --MRI brain ordered but patient states she is very claustrophobic and even with offering pre-medication, she is refusing to have it done.  --Echo no evidence of PFO.  --Continue ASA, high dose statin.  --Has been started on Eliquis.  --Neurology has signed off.  --PT/OT/SLP.      Elevated troponin  Acute on chronic diastolic CHF  Pulmonary HTN  --Recent admission at [facility] for CHF exacerbation? Data deficit.  --Echo here reviewed: EF 52%, grade I diastolic dysfunction, mild to moderate MR and TR, moderate pulmonary HTN with RVSP 45-55 mmHg.  --s/p 20 mg IV Lasix x 1.  --Repeat troponin with normal range.  --Cardiology has seen. Started Eliquis, Metoprolol bid for Afib (previously on Coreg at home).  --noted desaturations into 80s. Will trial restart of home lasix today. Renal function has improved, unknown baseline, but has good oral intake. Will recheck BMP in am  --Hold home Benazepril due to renal insufficiency, resume as tolerated. BP stable     COVID-19 positive  --Mild symptoms.  --Negative procal, normal lactate.  --CXR with mild patchy disease noted and cardiomegaly.  --has increased to 2L from RA with  desaturations. Will follow labs- bmp, cbc and crp in am.   --trial home lasix, may need to start decadron. Currently stable    Elevated Cr--Possible CKD?  --Cr 1.47 on admission, trending down-no prior labs for comparison  --Hold nephrotoxic meds and monitor closely.  --urine reviewed. Renal ultrasound with moderately atrophic right kidney, otherwise unremarkable     PAF  --Recent diagnosis, has been taking Plavix..  --Cardiology has seen and started on Eliquis and Metoprolol. Stopped Plavix.  --Echo results as above.  --Currently in NSR.     HTN  --Holding home Benazepril due to elevated Cr.  --home hydralazine restarted, bp better, stable. No change today  --Coreg switched to Metoprolol as above.     DM2  --HbA1 8.3%.  --On insulin at home.  --Low dose SSI and will add low dose levemir this morning     Hx of recurrent MRSA wound infection BLE  --Follows with wound care, has dressing changes/wraps every 2 days.  --Recently completed antibiotic course ~1-2 weeks ago, cannot remember name of antibiotic.  --PT wound following here.     DVT prophylaxis:  Medical and mechanical DVT prophylaxis orders are present.       AM-PAC 6 Clicks Score (PT): 7 (12/03/21 0800)    Disposition: I expect the patient to be discharged to SNF, TBD--apparently can not take until 12/9/21 due to COVID positive    CODE STATUS:   Code Status and Medical Interventions:   Ordered at: 11/29/21 0329     Code Status (Patient has no pulse and is not breathing):    CPR (Attempt to Resuscitate)     Medical Interventions (Patient has pulse or is breathing):    Full Support       Chantel Weathers, APRN  12/04/21

## 2021-12-04 NOTE — PLAN OF CARE
Goal Outcome Evaluation:              Outcome Summary: VSS. Patient A/Ox4. On room air. Patient has been NSR/A-fib with rate controlled. No acute events overnight. Despite being oriented, this patient's behavior has been inappropriate to situation. Patient has been frequently seeking out staff and displaying attention seeking behaviors. No acute events this shift. Continue POC.

## 2021-12-05 NOTE — PLAN OF CARE
Goal Outcome Evaluation:  Plan of Care Reviewed With: patient        Progress: improving  Outcome Summary: Cont to frequently seek staff attention. Awake most of the night. Cont on 2LNC. Denies CP or SOA. Monitor show SR/ In and out of Affib. Skin care done. VSS. Will cont with current POC

## 2021-12-05 NOTE — PLAN OF CARE
Goal Outcome Evaluation:  Plan of Care Reviewed With: patient        Progress: improving  Outcome Summary: Pt VSS. Cont to be anxious and attention seeking. No issues. SHIREEN Reyes RN.

## 2021-12-05 NOTE — NURSING NOTE
WOC reconsult:     Left gluteal    Patient presents with a small intact friction area to her left medial gluteal.   No open areas or areas of pressure identified.     Continue with good gernal skin care, q2 turn, and barrier cream.     PT wound following for compression therapy.     Yeast rash continues to panus and groin.  Continue with Nystatin powder per med order.     On a Dolphin bed.     WOC will continue to follow.     Thanks

## 2021-12-05 NOTE — PROGRESS NOTES
[facility] Medicine Services  PROGRESS NOTE    Patient Name: Otilia Givens  : 1950  MRN: [id number]    Date of Admission: 2021  Primary Care Physician: Shannon Aly MD    Subjective   Subjective     CC:  F/U CVA    HPI:    Seen sleeping comfortably in bed.  Chart reviewed.  Per nursing staff, the patient did not sleep well last night.  No other issues.    ROS:  Did not obtain.  Allow patient to sleep.    All other systems reviewed and negative except any additional pertinent positives and negatives as discussed in HPI.      Objective   Objective     Vital Signs:   Temp:  [96.3 °F (35.7 °C)-98.2 °F (36.8 °C)] 97.8 °F (36.6 °C)  Heart Rate:  [61-71] 64  Resp:  [16-20] 18  BP: (104-142)/(33-92) 130/63  Flow (L/min):  [2-22] 22     Physical Exam:    With patient's consent, physical exam was conducted via visual telemedicine encounter due to patient's current isolation requirements in the interest of PPE conservation.    Constitutional: No acute distress, awake, alert, nontoxic, normal body habitus  HENT: NCAT, MMM, no conjunctival injection  Respiratory: Good effort, nonlabored respirations on room air  Cardiovascular:  tele with NSR  Musculoskeletal: No edema, normal muscle tone and mass for age  Psychiatric: Appropriate affect, good insight and judgement, cooperative  Neurologic: Oriented x 3, movements symmetric BUE and BLE, speech clear and fluent  Skin: No visible rashes, no jaundice seen on exposed skin through window          Results Reviewed:  LAB RESULTS:      Lab 21  1021 21  0527 21  0923 21  0903 21  0338 21  0337   WBC 5.49 4.67 5.45 5.24 3.86  --    HEMOGLOBIN 9.3* 12.7 12.4 11.5* 12.4  --    HEMATOCRIT 28.6* 40.3 38.7 35.1 37.9  --    PLATELETS 188 162 161 166 184  --    NEUTROS ABS  --   --   --   --  2.45  --    IMMATURE GRANS (ABS)  --   --   --   --  0.01  --    LYMPHS ABS  --   --   --   --  0.73  --    MONOS ABS  --   --    --   --  0.54  --    EOS ABS  --   --   --   --  0.12  --    MCV 81.9 86.1 85.1 83.0 83.5  --    CRP 5.83*  --  3.21*  --  2.11*  --    PROCALCITONIN  --   --   --   --  0.18  --    LACTATE  --   --   --   --  1.5  --    LDH  --   --   --   --  200  --    PROTIME  --   --   --   --   --  13.2   APTT  --   --   --   --   --  35.6   D DIMER QUANT  --   --   --   --   --  1.08*         Lab 12/05/21  1021 12/04/21  0533 12/02/21  0527 12/01/21 0923 11/30/21  0903 11/29/21 0338 11/29/21 0338   SODIUM 140 139 137 133* 136   < > 139   POTASSIUM 4.1 4.7 4.6 4.5 4.3   < > 4.2   CHLORIDE 106 105 102 101 103   < > 102   CO2 24.0 21.0* 21.0* 23.0 23.0   < > 26.0   ANION GAP 10.0 13.0 14.0 9.0 10.0   < > 11.0   BUN 49* 43* 42* 38* 32*   < > 31*   CREATININE 1.66* 1.54* 1.73* 1.88* 1.58*   < > 1.47*   GLUCOSE 152* 198* 185* 182* 183*   < > 236*   CALCIUM 8.2* 8.5* 8.2* 8.1* 7.9*   < > 8.6   MAGNESIUM  --   --   --   --  2.3  --  1.9   HEMOGLOBIN A1C  --   --   --   --   --   --  8.30*    < > = values in this interval not displayed.         Lab 12/01/21 0923 11/29/21 0338   TOTAL PROTEIN 6.5 6.9   ALBUMIN 2.90* 3.00*   GLOBULIN 3.6 3.9   ALT (SGPT) 11 14   AST (SGOT) 21 20   BILIRUBIN 0.6 0.6   ALK PHOS 104 108         Lab 11/29/21  1513 11/29/21  0338 11/29/21  0337   PROBNP  --  7,820.0*  --    TROPONIN T 0.027 0.036*  --    PROTIME  --   --  13.2   INR  --   --  1.03         Lab 11/29/21 0338   CHOLESTEROL 135   LDL CHOL 81   HDL CHOL 34*   TRIGLYCERIDES 105         Lab 11/29/21 0338   FERRITIN 457.20*         Brief Urine Lab Results  (Last result in the past 365 days)      Color   Clarity   Blood   Leuk Est   Nitrite   Protein   CREAT   Urine HCG        12/01/21 1738             130.3               Microbiology Results Abnormal     Procedure Component Value - Date/Time    Blood Culture - Blood, Hand, Right [[id number]]  (Normal) Collected: 11/29/21 0536    Lab Status: Final result Specimen: Blood from Hand, Right  Updated: 12/04/21 0615     Blood Culture No growth at 5 days    Blood Culture - Blood, Arm, Right [[id number]]  (Normal) Collected: 11/29/21 0337    Lab Status: Final result Specimen: Blood from Arm, Right Updated: 12/04/21 0400     Blood Culture No growth at 5 days    Urine Culture - Urine, Urine, Clean Catch [[id number]] Collected: 11/29/21 0419    Lab Status: Final result Specimen: Urine, Clean Catch Updated: 11/30/21 1503     Urine Culture 50,000 CFU/mL Mixed Jacey Isolated    Narrative:      Specimen contains mixed organisms of questionable pathogenicity which indicates contamination with commensal jacey.  Further identification is unlikely to provide clinically useful information.  Suggest recollection.          FL Video Swallow With Speech Single Contrast    Result Date: 12/3/2021  EXAMINATION: FL VIDEO SWALLOW W SPEECH SINGLE-CONTRAST-  INDICATION: dysphagia; R47.1-Dysarthria and anarthria; R13.10-Dysphagia, unspecified; U07.1-COVID-19  TECHNIQUE: 36 seconds of fluoroscopic time was used for this exam. 6 associated fluoroscopic loops were saved. The patient was evaluated in the seated lateral position while taking a variety of consistencies of barium by mouth under the direction of speech pathology.  COMPARISON: NONE  FINDINGS: 36 seconds of fluoroscopy provided for a modified barium swallow. Please see speech therapy report for full details and recommendations.       Impression: Fluoroscopy provided for a modified barium swallow. Please see speech therapy report for full details and recommendations.          Results for orders placed during the hospital encounter of 11/29/21    Adult Transthoracic Echo Complete W/ Cont if Necessary Per Protocol (With Agitated Saline)    Interpretation Summary  · Left ventricular systolic function is normal. Estimated left ventricular EF = 52%.  · Left ventricular wall thickness is consistent with mild concentric hypertrophy.  · Mild to moderate mitral valve regurgitation is  present with an eccentric jet noted.  · Mild to moderate tricuspid valve regurgitation is present.  · Estimated right ventricular systolic pressure from tricuspid regurgitation is moderately elevated (45-55 mmHg).  · Saline test results are negative for right to left atrial level shunt.      I have reviewed the medications:  Scheduled Meds:albuterol sulfate HFA, 2 puff, Inhalation, 4x Daily - RT  apixaban, 5 mg, Oral, Q12H  aspirin, 81 mg, Oral, Daily  atorvastatin, 80 mg, Oral, Nightly  bisacodyl, 10 mg, Rectal, Daily   And  senna-docusate sodium, 2 tablet, Oral, BID   And  polyethylene glycol, 17 g, Oral, Daily  furosemide, 20 mg, Intravenous, Once  furosemide, 40 mg, Oral, Daily  hydrALAZINE, 25 mg, Oral, TID  insulin detemir, 5 Units, Subcutaneous, Daily  insulin lispro, 0-7 Units, Subcutaneous, TID AC  melatonin, 5 mg, Oral, Nightly  metoprolol tartrate, 50 mg, Oral, Q12H  miconazole, , Topical, Q12H  nystatin, , Topical, Q12H  pantoprazole, 40 mg, Oral, Q AM  sodium chloride, 10 mL, Intravenous, Q12H  sodium chloride, 10 mL, Intravenous, Q12H      Continuous Infusions:   PRN Meds:.•  acetaminophen **OR** acetaminophen **OR** acetaminophen  •  benzonatate  •  senna-docusate sodium **AND** polyethylene glycol **AND** bisacodyl **AND** bisacodyl  •  dextrose  •  dextrose  •  glucagon (human recombinant)  •  magnesium sulfate **OR** magnesium sulfate **OR** magnesium sulfate  •  metoprolol tartrate  •  ondansetron **OR** ondansetron  •  potassium chloride **OR** potassium chloride **OR** potassium chloride  •  senna-docusate sodium **AND** [DISCONTINUED] polyethylene glycol **AND** [DISCONTINUED] bisacodyl **AND** [DISCONTINUED] bisacodyl  •  sodium chloride  •  sodium chloride    Assessment/Plan   Assessment & Plan     Active Hospital Problems    Diagnosis  POA   • **Suspected subacute cerebrovascular accident (CVA) [R09.89]  Yes   • Elevated serum creatinine [R79.89]  Yes   • Paroxysmal atrial fibrillation (HCC)  [I48.0]  Yes   • COVID-19 virus detected [U07.1]  Yes   • Right sided weakness [R53.1]  Yes   • T2DM (type 2 diabetes mellitus) ([facility]) [E11.9]  Yes   • Essential hypertension [I10]  Yes   • Acute on chronic diastolic CHF (congestive heart failure) ([facility]) [I50.33]  Yes   • History of MRSA infection [Z86.14]  Yes      Resolved Hospital Problems   No resolved problems to display.        Brief Hospital Course to date:  Otilia Givens is a 71 y.o. female with hx of HTN, DM2, PAF, GERD, TIA, and chronic venous stasis with hx of MRSA leg wounds who presents from OSH due to right sided weakness and slurred speech. Also complained of a mild cough. Apparently her right sided weakness has been going on for 2 weeks. At the OSH, CT head was negative, NIH was 6. COVID-19 was positive. Transferred to [facility] for Neurology evaluation.    This patient's problems and plans were partially entered by my partner and updated as appropriate by me 12/05/21.      Right sided weakness and slurred speech  Probable subacute CVA  Hx of TIA  --CT head and CT perfusion negative. CTA head/neck showed moderate to high-grade short segment stenosis in the proximal right P2 segment, mild stenosis of proximal left posterior M2 branch.  --Has hx of recent diagnosis of Afib and had been taking Plavix at home.  --MRI brain ordered but patient states she is very claustrophobic and even with offering pre-medication, she refused to have it done.  --Echo no evidence of PFO.  --Continue ASA, high dose statin.  --Has been started on Eliquis, Plavix discontinued.  --Neurology has signed off.  --PT/OT/SLP.  Needs placement.     Elevated troponin  Acute on chronic diastolic CHF  Pulmonary HTN  --Recent admission at [facility] for CHF exacerbation? Data deficit.  --Echo here reviewed: EF 52%, grade I diastolic dysfunction, mild to moderate MR and TR, moderate pulmonary HTN with RVSP 45-55 mmHg.  --s/p 20 mg IV Lasix x 1.  --Repeat troponin with normal range.  --Cardiology  has seen. Started Eliquis, Metoprolol bid for Afib (previously on Coreg at home).  --Tolerating home dose of Lasix.  --Hold home Benazepril due to renal insufficiency, resume as tolerated. BP stable currently.     COVID-19 positive  --Mild symptoms.  --Negative procal, normal lactate.  --CXR with mild patchy disease noted and cardiomegaly.  --Fluctuates between room air and 2 L.    Elevated Cr--Possible CKD?  --Cr 1.47 on admission,ranging 1.5-1.6 currently. Monitor.  --Hold nephrotoxic meds and monitor closely.  --urine reviewed. Renal ultrasound with moderately atrophic right kidney, otherwise unremarkable     PAF  --Recent diagnosis, has been taking Plavix..  --Cardiology has seen and started on Eliquis and Metoprolol. Stopped Plavix.  --Echo results as above.  --Currently in NSR.     HTN  --Holding home Benazepril due to elevated Cr.  --home hydralazine restarted, bp better, stable.   --Coreg switched to Metoprolol as above.     DM2  --HbA1 8.3%.  --On insulin at home.  --Low dose SSI and  low dose levemir     Hx of recurrent MRSA wound infection BLE  --Follows with wound care, has dressing changes/wraps every 2 days.  --Recently completed antibiotic course ~1-2 weeks ago, cannot remember name of antibiotic.  --PT wound following here.     DVT prophylaxis:  Medical and mechanical DVT prophylaxis orders are present.       AM-PAC 6 Clicks Score (PT): 6 (12/05/21 0800)    Disposition: I expect the patient to be discharged to SNF, TBD--apparently can not take until 12/9/21 due to COVID positive    CODE STATUS:   Code Status and Medical Interventions:   Ordered at: 11/29/21 0329     Code Status (Patient has no pulse and is not breathing):    CPR (Attempt to Resuscitate)     Medical Interventions (Patient has pulse or is breathing):    Full Support       Lizeth Antunez, SUDHA  12/05/21

## 2021-12-06 NOTE — THERAPY TREATMENT NOTE
Patient Name: Otilia Givens  : 1950    MRN: [id number]                              Today's Date: 2021       Admit Date: 2021    Visit Dx:     ICD-10-CM ICD-9-CM   1. Dysarthria  R47.1 784.51   2. Oral phase dysphagia  R13.11 787.21   3. COVID-19 virus detected  U07.1 079.89     Patient Active Problem List   Diagnosis   • Paroxysmal atrial fibrillation (HCC)   • COVID-19 virus detected   • Right sided weakness   • T2DM (type 2 diabetes mellitus) (HCC)   • Essential hypertension   • Personal history of transient ischemic attack (TIA), and cerebral infarction without residual deficits   • Acute on chronic diastolic CHF (congestive heart failure) (HCC)   • History of MRSA infection   • Coronary artery disease involving native coronary artery of native heart   • Suspected subacute cerebrovascular accident (CVA)   • Elevated serum creatinine     Past Medical History:   Diagnosis Date   • Afib (HCC)    • CHF (congestive heart failure) (HCC)    • Diabetes (HCC)    • GERD (gastroesophageal reflux disease)    • Hypertension    • Stroke (HCC)      Past Surgical History:   Procedure Laterality Date   • HYSTERECTOMY     • TOE AMPUTATION Left       General Information     Row Name 21 1350          Physical Therapy Time and Intention    Document Type therapy note (daily note)  -NS     Mode of Treatment individual therapy; physical therapy  -NS     Row Name 21 1126          General Information    Patient Profile Reviewed yes  -NS     Existing Precautions/Restrictions fall; other (see comments)  R sided weakness/neglect  -NS     Row Name 21 3631          Cognition    Orientation Status (Cognition) oriented to; person; disoriented to; place; situation; time  -NS     Row Name 21 6882          Safety Issues, Functional Mobility    Safety Issues Affecting Function (Mobility) ability to follow commands; insight into deficits/self-awareness; problem-solving; safety precaution awareness; safety  precautions follow-through/compliance; sequencing abilities  -NS     Impairments Affecting Function (Mobility) balance; cognition; coordination; endurance/activity tolerance; motor planning; strength; muscle tone abnormal; postural/trunk control; range of motion (ROM)  -NS     Cognitive Impairments, Mobility Safety/Performance attention; problem-solving/reasoning; safety precaution awareness; safety precaution follow-through; sequencing abilities  -NS     Comment, Safety Issues/Impairments (Mobility) Pt's speech more slurred compared to last session, oriented to person only today but Ox3 last session. Pt requires cues for answering questions, staring ahead and requiring questions repeated multiple times.   -NS           User Key  (r) = Recorded By, (t) = Taken By, (c) = Cosigned By    Initials Name Provider Type    Isela Elizalde PT Physical Therapist               Mobility     Row Name 12/06/21 1354          Bed Mobility    Bed Mobility rolling left; rolling right  -NS     Rolling Left [place] (Bed Mobility) dependent (less than 25% patient effort); 2 person assist; verbal cues  -NS     Rolling Right [place] (Bed Mobility) dependent (less than 25% patient effort); 2 person assist; verbal cues  -NS     Comment (Bed Mobility) VCs for hand placement.  -NS     Row Name 12/06/21 1355          Transfers    Comment (Transfers) Transfer to chair via lift. Not appropriate to assess standing at this time.  -NS     Row Name 12/06/21 1355          Bed-Chair Transfer    Bed-Chair [place] (Transfers) dependent (less than 25% patient effort); 2 person assist  -NS     Assistive Device (Bed-Chair Transfers) lift device  -NS     Row Name 12/06/21 1355          Sit-Stand Transfer    Sit-Stand [place] (Transfers) not tested  -NS           User Key  (r) = Recorded By, (t) = Taken By, (c) = Cosigned By    Initials Name Provider Type    Isela Elizalde PT Physical Therapist               Obj/Interventions     Row  Name 12/06/21 [facility]          Motor Skills    Motor Skills therapeutic exercise  -NS     Therapeutic Exercise knee; ankle  -NS     Row Name 12/06/21 [facility]          Knee (Therapeutic Exercise)    Knee (Therapeutic Exercise) isometric exercises  -NS     Knee Isometrics (Therapeutic Exercise) bilateral; quad sets; 10 repetitions  a few moments of partial quad activation noted  -NS     Row Name 12/06/21 [facility]          Ankle (Therapeutic Exercise)    Ankle (Therapeutic Exercise) AAROM (active assistive range of motion); PROM (passive range of motion)  -NS     Ankle AAROM (Therapeutic Exercise) left; dorsiflexion; plantarflexion; 10 repetitions; 2 sets  -NS     Ankle PROM (Therapeutic Exercise) right; dorsiflexion; plantarflexion; 10 repetitions  2 sets  -NS     Row Name 12/06/21 [facility]          Balance    Balance Assessment sitting static balance  -NS     Static Sitting Balance severe impairment; unsupported; sitting in chair  -NS     Comment, Balance Max A x2 to maintain sitting unsupported without back rest. Pt only able to maintain for a few seconds. R lateral lean noted. Significantly more assist required compared to last session.  -NS           User Key  (r) = Recorded By, (t) = Taken By, (c) = Cosigned By    Initials Name Provider Type    Isela Elizalde PT Physical Therapist               Goals/Plan    No documentation.                Clinical Impression     Row Name 12/06/21 [facility]          Pain    Additional Documentation Pain Scale: FACES Pre/Post-Treatment (Group)  -NS     Row Name 12/06/21 [facility]          Pain Scale: Numbers Pre/Post-Treatment    Pain Intervention(s) Nursing Notified; Repositioned  -NS     Row Name 12/06/21 [facility]0          Pain Scale: FACES Pre/Post-Treatment    Pain: FACES Scale, Pretreatment 2-->hurts little bit  -NS     Posttreatment Pain Rating 2-->hurts little bit  -NS     Pre/Posttreatment Pain Comment Pt stated a few times that she had pain, but when asked where she could only stare at this PT  without verbalizing or pointing to location.  -NS     Row Name 12/06/21 2827          Plan of Care Review    Plan of Care Reviewed With patient  -NS     Progress declining  -NS     Outcome Summary Pt demonstrated worsened cognition and required increased assist with mobility and sitting balance today. Pt with speech more slurred and oriented to person only today compared to Ox3 last session. She was Dep x2 for rolling and required Max A x2 for static sitting balance (Min A last session). Overall pt had more difficulty attend to tasks and following commands. RN immediately notified. Will continue per PT POC.  -NS     Row Name 12/06/21 1350          Vital Signs    Pretreatment Heart Rate (beats/min) 68  -NS     Posttreatment Heart Rate (beats/min) 70  -NS     Pre SpO2 (%) 92  -NS     O2 Delivery Pre Treatment nasal cannula  -NS     Post SpO2 (%) 94  -NS     O2 Delivery Post Treatment nasal cannula  -NS     Pre Patient Position Supine  -NS     Intra Patient Position Side Lying  -NS     Post Patient Position Sitting  -NS     Row Name 12/06/21 8432          Positioning and Restraints    Pre-Treatment Position in bed  -NS     Post Treatment Position chair  -NS     In Chair notified nsg; reclined; call light within reach; encouraged to call for assist; exit alarm on; legs elevated; waffle cushion; on mechanical lift sling; RUE elevated  -NS           User Key  (r) = Recorded By, (t) = Taken By, (c) = Cosigned By    Initials Name Provider Type    Isela Elizalde, PT Physical Therapist               Outcome Measures     Row Name 12/06/21 7553          How much help from another person do you currently need...    Turning from your back to your side while in flat bed without using bedrails? 1  -NS     Moving from lying on back to sitting on the side of a flat bed without bedrails? 1  -NS     Moving to and from a bed to a chair (including a wheelchair)? 1  -NS     Standing up from a chair using your arms (e.g., wheelchair,  bedside chair)? 1  -NS     Climbing 3-5 steps with a railing? 1  -NS     To walk in hospital room? 1  -NS     AM-PAC 6 Clicks Score (PT) 6  -NS     Row Name 12/06/21 1355          Modified Morgan Scale    Modified Morgan Scale 5 - Severe disability.  Bedridden, incontinent, and requiring constant nursing care and attention.  -NS     Row Name 12/06/21 1355          Functional Assessment    Outcome Measure Options AM-PAC 6 Clicks Basic Mobility (PT); Modified [place]  -NS           User Key  (r) = Recorded By, (t) = Taken By, (c) = Cosigned By    Initials Name Provider Type    Isela Elizalde PT Physical Therapist                             Physical Therapy Education                 Title: PT OT SLP Therapies (In Progress)     Topic: Physical Therapy (In Progress)     Point: Mobility training (In Progress)     Learning Progress Summary           Patient Acceptance, E, NR by NS at 12/6/2021 1544    Nonacceptance, E,TB, NR,NL by  at 12/3/2021 1049    Acceptance, E, VU,NR by NS at 12/2/2021 1315    Acceptance, E, VU,NR by NS at 11/29/2021 1115                   Point: Home exercise program (In Progress)     Learning Progress Summary           Patient Acceptance, E, NR by NS at 12/6/2021 1544    Nonacceptance, E,TB, NR,NL by  at 12/3/2021 1049    Acceptance, E, VU,NR by NS at 12/2/2021 1315    Acceptance, E, VU,NR by NS at 11/29/2021 1115                   Point: Body mechanics (In Progress)     Learning Progress Summary           Patient Acceptance, E, NR by NS at 12/6/2021 1544    Nonacceptance, E,TB, NR,NL by  at 12/3/2021 1049    Acceptance, E, VU,NR by NS at 12/2/2021 1315    Acceptance, E, VU,NR by NS at 11/29/2021 1115                   Point: Precautions (In Progress)     Learning Progress Summary           Patient Acceptance, E, NR by NS at 12/6/2021 1544    Nonacceptance, E,TB, NR,NL by  at 12/3/2021 1049    Acceptance, E, VU,NR by NS at 12/2/2021 1315    Acceptance, E, VU,NR by NS at 11/29/2021 1112                                User Key     Initials Effective Dates Name Provider Type Discipline     06/16/21 -  Adriana Chavez RN Registered Nurse Nurse    NS 06/16/21 -  Isela Hathaway PT Physical Therapist PT              PT Recommendation and Plan     Plan of Care Reviewed With: patient  Progress: declining  Outcome Summary: Pt demonstrated worsened cognition and required increased assist with mobility and sitting balance today. Pt with speech more slurred and oriented to person only today compared to Ox3 last session. She was Dep x2 for rolling and required Max A x2 for static sitting balance (Min A last session). Overall pt had more difficulty attend to tasks and following commands. RN immediately notified. Will continue per PT POC.     Time Calculation:    PT Charges     Row Name 12/06/21 1355             Time Calculation    Start Time 1355  -NS      PT Received On 12/06/21  -NS      PT Goal Re-Cert Due Date 12/09/21  -NS              Timed Charges    33716 - PT Therapeutic Exercise Minutes 5  -NS      01752 - PT Therapeutic Activity Minutes 40  -NS              Total Minutes    Timed Charges Total Minutes 45  -NS       Total Minutes 45  -NS            User Key  (r) = Recorded By, (t) = Taken By, (c) = Cosigned By    Initials Name Provider Type    NS Isela Hathaway PT Physical Therapist              Therapy Charges for Today     Code Description Service Date Service Provider Modifiers Qty    48674145130 HC PT THERAPEUTIC ACT EA 15 MIN 12/6/2021 Isela Hathaway, PT GP 3    [id number] HC PT THER SUPP EA 15 MIN 12/6/2021 Isela Hathaway PT GP 3          PT G-Codes  Outcome Measure Options: AM-PAC 6 Clicks Basic Mobility (PT), Modified [place]  AM-PAC 6 Clicks Score (PT): 6  AM-PAC 6 Clicks Score (OT): 9  Modified Morgan Scale: 5 - Severe disability.  Bedridden, incontinent, and requiring constant nursing care and attention.    Isela Hathaway PT  12/6/2021

## 2021-12-06 NOTE — THERAPY RE-EVALUATION
Acute Care - Speech Language Pathology   Swallow Re-Evaluation [facility]     Patient Name: Otilia Givens  : 1950  MRN: [id number]  Today's Date: 2021               Admit Date: 2021    Visit Dx:     ICD-10-CM ICD-9-CM   1. Dysarthria  R47.1 784.51   2. Oral phase dysphagia  R13.11 787.21   3. COVID-19 virus detected  U07.1 079.89     Patient Active Problem List   Diagnosis   • Paroxysmal atrial fibrillation (HCC)   • COVID-19 virus detected   • Right sided weakness   • T2DM (type 2 diabetes mellitus) ([facility])   • Essential hypertension   • Personal history of transient ischemic attack (TIA), and cerebral infarction without residual deficits   • Acute on chronic diastolic CHF (congestive heart failure) ([facility])   • History of MRSA infection   • Coronary artery disease involving native coronary artery of native heart   • Suspected subacute cerebrovascular accident (CVA)   • Elevated serum creatinine     Past Medical History:   Diagnosis Date   • Afib (HCC)    • CHF (congestive heart failure) (HCC)    • Diabetes (HCC)    • GERD (gastroesophageal reflux disease)    • Hypertension    • Stroke ([facility])      Past Surgical History:   Procedure Laterality Date   • HYSTERECTOMY     • TOE AMPUTATION Left        SLP Recommendation and Plan  SLP Swallowing Diagnosis: moderate, oral dysphagia, R/O pharyngeal dysphagia (21)  SLP Diet Recommendation: puree with some mashed, thin liquids (21)  Recommended Precautions and Strategies: upright posture during/after eating, small bites of food and sips of liquid, general aspiration precautions, reflux precautions, fatigue precautions, assist with feeding, 1:1 supervision (21)  SLP Rec. for Method of Medication Administration: meds whole, with pudding or applesauce, as tolerated (21)     Monitor for Signs of Aspiration: yes, notify SLP if any concerns (21)  Recommended Diagnostics: reassess via clinical swallow evaluation  (12/06/21 0930)  Swallow Criteria for Skilled Therapeutic Interventions Met: demonstrates skilled criteria (12/06/21 0930)  Anticipated Discharge Disposition (SLP): anticipate therapy at next level of care (12/06/21 0930)  Rehab Potential/Prognosis, Swallowing: good, to achieve stated therapy goals (12/06/21 0930)  Therapy Frequency (Swallow): PRN (12/06/21 0930)  Predicted Duration Therapy Intervention (Days): until discharge (12/06/21 0930)                         Plan of Care Reviewed With: patient  Progress: no change      SWALLOW EVALUATION (last 72 hours)     SLP Adult Swallow Evaluation     Row Name 12/06/21 0930       Rehab Evaluation    Document Type re-evaluation; therapy note (daily note)  -CJ    Subjective Information no complaints  -CJ    Patient Observations cooperative; agree to therapy  -CJ    Patient/Family/Caregiver Comments/Observations no family present; covid positive  -CJ    Patient Effort adequate  -CJ    Symptoms Noted During/After Treatment none  -CJ            General Information    Patient Profile Reviewed yes  -CJ    Pertinent History Of Current Problem see previous eval; new order placed 2/2 concern for difficulty w/ solids  -CJ    Current Method of Nutrition soft textures; chopped; thin liquids  -CJ    Precautions/Limitations, Vision WFL; for purposes of eval  -CJ    Precautions/Limitations, Hearing WFL; for purposes of eval  -CJ    Prior Level of Function-Swallowing unknown  -CJ    Plans/Goals Discussed with patient; agreed upon  -    Barriers to Rehab none identified  -CJ    Patient's Goals for Discharge patient did not state  -CJ            Pain    Additional Documentation Pain Scale: FACES Pre/Post-Treatment (Group)  -CJ            Pain Scale: FACES Pre/Post-Treatment    Pain: FACES Scale, Pretreatment 0-->no hurt  -CJ    Posttreatment Pain Rating 0-->no hurt  -CJ            Oral Motor Structure and Function    Dentition Assessment edentulous  -CJ    Secretion Management WNL/WFL   -CJ    Mucosal Quality moist, healthy  -CJ    Volitional Swallow delayed  -CJ            Oral Musculature and Cranial Nerve Assessment    Oral Motor General Assessment oral labial or buccal impairment  -CJ    Oral Labial or Buccal Impairment, Detail, Cranial Nerve VII (Facial): right labial droop  -CJ            General Eating/Swallowing Observations    Respiratory Support Currently in Use room air  -CJ    Eating/Swallowing Skills fed by SLP  -CJ    Positioning During Eating upright 90 degree; upright in bed  -CJ    Utensils Used spoon; cup; straw  -CJ    Consistencies Trialed thin liquids; ice chips; pureed; soft textures  -CJ            Clinical Swallow Eval    Oral Prep Phase impaired  -CJ    Oral Residue impaired  -CJ    Pharyngeal Phase suspected pharyngeal impairment  -CJ    Clinical Swallow Evaluation Summary CSE re-eval completed this am. Mrs. Givens is positioned upright and centered in bed to accept po presentations. Pt noted w/ anterior loss w/ thins. Oral residue w/ solids, unable to adequately masticate solid cracker, removed from oral cavity. Cough w/ large consecutive sips only. No other overt s/s of aspiration w/ thins and puree presentations. Will downgrade to puree/mashed w/ thin liquids and f/u for re-eval tomorrow to ensure no further difficulties  -CJ            Oral Prep Concerns    Oral Prep Concerns prolonged mastication; inefficient mastication; bolus removed from mouth manually  -CJ    Prolonged Mastication regular consistencies  -CJ    Inefficient Mastication regular consistencies  -CJ    Bolus Removed from Mouth Manually regular consistencies  -CJ            Pharyngeal Phase Concerns    Pharyngeal Phase Concerns cough  -CJ    Cough thin; other (see comments)  large sips only x1  -CJ            MBS/VFSS    Utensils Used --    Consistencies Trialed --            MBS/VFSS Interpretation    Oral Prep Phase --    Oral Transit Phase --    Oral Residue --            Oral Preparatory Phase    Oral  Preparatory Phase --    Anterior Loss --    Prolonged Manipulation --            Oral Transit Phase    Impaired Oral Transit Phase --    Piecemeal Oral Transit --    Premature Spillage of Liquids into Pharynx --            Oral Residue    Impaired Oral Residue --    Lingual Residue --    Response to Oral Residue --    Oral Residue, Comment --            Initiation of Pharyngeal Swallow    Initiation of Pharyngeal Swallow --    Pharyngeal Phase --    Penetration During the Swallow --    Response to Penetration --    Rosenbek's Scale --    Pharyngeal Residue --    Pharyngeal Phase, Comment --            Clinical Impression    SLP Swallowing Diagnosis moderate; oral dysphagia; R/O pharyngeal dysphagia  -    Functional Impact risk of aspiration/pneumonia  -    Rehab Potential/Prognosis, Swallowing good, to achieve stated therapy goals  -    Swallow Criteria for Skilled Therapeutic Interventions Met demonstrates skilled criteria  -            Recommendations    Therapy Frequency (Swallow) PRN  -    Predicted Duration Therapy Intervention (Days) until discharge  -    SLP Diet Recommendation puree with some mashed; thin liquids  -    Recommended Diagnostics reassess via clinical swallow evaluation  -    Recommended Precautions and Strategies upright posture during/after eating; small bites of food and sips of liquid; general aspiration precautions; reflux precautions; fatigue precautions; assist with feeding; 1:1 supervision  -    Oral Care Recommendations Oral Care BID/PRN  -    SLP Rec. for Method of Medication Administration meds whole; with pudding or applesauce; as tolerated  -    Monitor for Signs of Aspiration yes; notify SLP if any concerns  -    Anticipated Discharge Disposition (SLP) anticipate therapy at next level of care  -          User Key  (r) = Recorded By, (t) = Taken By, (c) = Cosigned By    Initials Name Effective Dates    Fartun Barnett, MS CCC-SLP 06/16/21 -     HA Maki  Connie SALDANA MS CCC-SLP 06/16/21 -                 EDUCATION  The patient has been educated in the following areas:   Cognitive Impairment Communication Impairment Dysphagia (Swallowing Impairment) Oral Care/Hydration Modified Diet Instruction.        SLP GOALS     Row Name 12/06/21 0930       Oral Nutrition/Hydration Goal 1 (SLP)    Oral Nutrition/Hydration Goal 1, SLP LTG: Pt will tolerate soft choppeddiet and thin liquids w/o s/s of aspiration or distress.  -CJ    Time Frame (Oral Nutrition/Hydration Goal 1, SLP) by discharge  -CJ    Progress/Outcomes (Oral Nutrition/Hydration Goal 1, SLP) goal ongoing  -CJ           Oral Nutrition/Hydration Goal 2 (SLP)    Oral Nutrition/Hydration Goal 2, SLP Pt will tolerate trials of soft solids and thin liquids w/o s/s of aspiration w/ 100% acc and no cues.  -CJ    Time Frame (Oral Nutrition/Hydration Goal 2, SLP) short term goal (STG)  -CJ    Progress/Outcomes (Oral Nutrition/Hydration Goal 2, SLP) goal ongoing  -CJ           Labial Strengthening Goal 1 (SLP)    Activity (Labial Strengthening Goal 1, SLP) increase labial tone  -CJ    Increase Labial Tone labial resistance exercises  -CJ    [place]/Accuracy (Labial Strengthening Goal 1, SLP) with minimal cues (75-90% accuracy)  -CJ    Time Frame (Labial Strengthening Goal 1, SLP) short term goal (STG)  -CJ    Progress/Outcomes (Labial Strengthening Goal 1, SLP) goal ongoing  -CJ           Lingual Strengthening Goal 1 (SLP)    Activity (Lingual Strengthening Goal 1, SLP) increase lingual tone/sensation/control/coordination/movement; increase tongue back strength  -CJ    Increase Lingual Tone/Sensation/Control/Coordination/Movement lingual resistance exercises  -CJ    Increase Tongue Back Strength lingual resistance exercises  -CJ    [place]/Accuracy (Lingual Strengthening Goal 1, SLP) with minimal cues (75-90% accuracy)  -CJ    Time Frame (Lingual Strengthening Goal 1, SLP) short term goal (STG)  -CJ     Progress/Outcomes (Lingual Strengthening Goal 1, SLP) goal ongoing  -CJ           Reduce Perception of Hypernasality Goal 1 (SLP)    Reduce Perception of Hypernasality By Goal 1 (SLP) opening mouth wider for speech; 80%; with minimal cues (75-90%)  -CJ    Time Frame (Perception of Hypernasality Goal 1, SLP) short term goal (STG)  -CJ    Progress (Perception of Hypernasality Goal 1, SLP) 60%; with moderate cues (50-74%)  -CJ    Progress/Outcomes (Perception of Hypernasality Goal 1, SLP) continuing progress toward goal  -CJ           Articulation Goal 1 (SLP)    Improve Articulation Goal 1 (SLP) by over-articulating at word level; by over-articulating at phrase level; by over-articulating in connected speech; 80%; with minimal cues (75-90%)  -CJ    Time Frame (Articulation Goal 1, SLP) short term goal (STG)  -CJ    Progress (Articulation Goal 1, SLP) 60%; with moderate cues (50-74%)  -CJ    Progress/Outcomes (Articulation Goal 1, SLP) continuing progress toward goal  -CJ           Additional Goal 1 (SLP)    Additional Goal 1, SLP LTG: Pt will improve speech intelligibility in order to be able to increase participation in conversations and care at this level.  -CJ    Time Frame (Additional Goal 1, SLP) by discharge  -CJ    Progress/Outcomes (Additional Goal 1, SLP) continuing progress toward goal  -CJ          User Key  (r) = Recorded By, (t) = Taken By, (c) = Cosigned By    Initials Name Provider Type    Fartun Barnett, MS CCC-SLP Speech and Language Pathologist    Connie Wan, MS CCC-SLP Speech and Language Pathologist                   Time Calculation:    Time Calculation- SLP     Row Name 12/06/21 1136             Time Calculation- SLP    SLP Start Time 0930  -CJ      SLP Received On 12/06/21  -CJ              Untimed Charges    91271-[id number] Eval Oral Pharyng Swallow Minutes 39  -CJ      84149-RU Treatment/ST Modification Prosth Aug Alter  23  -CJ              Total Minutes    Untimed Charges Total Minutes  62  -CJ       Total Minutes 62  -CJ            User Key  (r) = Recorded By, (t) = Taken By, (c) = Cosigned By    Initials Name Provider Type    Connie Wan, MS CCC-SLP Speech and Language Pathologist                Therapy Charges for Today     Code Description Service Date Service Provider Modifiers Qty    [id number]  ST EVAL ORAL PHARYNG SWALLOW 3 12/6/2021 Connie Maki MS CCC-SLP GN 1    [id number]  ST TREATMENT SPEECH 2 12/6/2021 Connie Maki, MS CCC-SLP GN 1               Connie Maki MS CCC-ANABEL  12/6/2021

## 2021-12-06 NOTE — PLAN OF CARE
Goal Outcome Evaluation:  Plan of Care Reviewed With: patient        Progress: no change  Outcome Summary: VSS. Pt currently on 2L NC due to a desat in the high 80s a couple times this morning. Pt was tolerating RA before that. Pt remains A. fib on the monitor <100. Pt did have 2 runs of accelerated A.fib neither was sustained. 1 run was in the 110s and the 2nd run was 149 for a couple seconds then back down to <100.

## 2021-12-06 NOTE — PROGRESS NOTES
[facility] Medicine Services  PROGRESS NOTE    Patient Name: Otilia Givens  : 1950  MRN: [id number]    Date of Admission: 2021  Primary Care Physician: Shannon Aly MD    Subjective   Subjective     CC:  F/U CVA    HPI:  No issues overnight.  Patient unable to provide much history, but does not verbalize any complaints.  No issues from RN discussion.    ROS:  Unable to obtain d/t limited verbalization.      Objective   Objective     Vital Signs:   Temp:  [97.2 °F (36.2 °C)-97.8 °F (36.6 °C)] 97.2 °F (36.2 °C)  Heart Rate:  [67-70] 67  Resp:  [18-21] 21  BP: (103-127)/(51-73) 114/55  Flow (L/min):  [2] 2     Physical Exam:  Constitutional: chronically ill appearing  HENT: NCAT, mucous membranes moist  Respiratory: Clear to auscultation bilaterally, respiratory effort normal on1L  Cardiovascular: RRR, no murmurs, rubs, or gallops  Gastrointestinal: morbidly obese, Positive bowel sounds, soft, nontender, nondistended  Musculoskeletal: No bilateral ankle edema  Psychiatric: Appropriate affect, cooperative  Neurologic: able to say her name, but not very verbal beyond that.  0/5  on right, 0/5 left leg, though difficult to  based on effort.  Skin: No rashes      Results Reviewed:  LAB RESULTS:      Lab 21  1021 21  0903   WBC 5.49 4.67 5.45 5.24   HEMOGLOBIN 9.3* 12.7 12.4 11.5*   HEMATOCRIT 28.6* 40.3 38.7 35.1   PLATELETS 188 162 161 166   MCV 81.9 86.1 85.1 83.0   CRP 5.83*  --  3.21*  --          Lab 21  1021 21  0533 21  0521  0903   SODIUM 140 139 137 133* 136   POTASSIUM 4.1 4.7 4.6 4.5 4.3   CHLORIDE 106 105 102 101 103   CO2 24.0 21.0* 21.0* 23.0 23.0   ANION GAP 10.0 13.0 14.0 9.0 10.0   BUN 49* 43* 42* 38* 32*   CREATININE 1.66* 1.54* 1.73* 1.88* 1.58*   GLUCOSE 152* 198* 185* 182* 183*   CALCIUM 8.2* 8.5* 8.2* 8.1* 7.9*   MAGNESIUM  --   --   --   --  2.3         Lab  12/01/21  0923   TOTAL PROTEIN 6.5   ALBUMIN 2.90*   GLOBULIN 3.6   ALT (SGPT) 11   AST (SGOT) 21   BILIRUBIN 0.6   ALK PHOS 104         Lab 11/29/21  1513   TROPONIN T 0.027                 Brief Urine Lab Results  (Last result in the past 365 days)      Color   Clarity   Blood   Leuk Est   Nitrite   Protein   CREAT   Urine HCG        12/01/21 1738             130.3               Microbiology Results Abnormal     Procedure Component Value - Date/Time    Blood Culture - Blood, Hand, Right [[id number]]  (Normal) Collected: 11/29/21 0536    Lab Status: Final result Specimen: Blood from Hand, Right Updated: 12/04/21 0615     Blood Culture No growth at 5 days    Blood Culture - Blood, Arm, Right [[id number]]  (Normal) Collected: 11/29/21 0337    Lab Status: Final result Specimen: Blood from Arm, Right Updated: 12/04/21 0400     Blood Culture No growth at 5 days    Urine Culture - Urine, Urine, Clean Catch [[id number]] Collected: 11/29/21 0419    Lab Status: Final result Specimen: Urine, Clean Catch Updated: 11/30/21 1503     Urine Culture 50,000 CFU/mL Mixed Jacey Isolated    Narrative:      Specimen contains mixed organisms of questionable pathogenicity which indicates contamination with commensal jacey.  Further identification is unlikely to provide clinically useful information.  Suggest recollection.          No radiology results from the last 24 hrs    Results for orders placed during the hospital encounter of 11/29/21    Adult Transthoracic Echo Complete W/ Cont if Necessary Per Protocol (With Agitated Saline)    Interpretation Summary  · Left ventricular systolic function is normal. Estimated left ventricular EF = 52%.  · Left ventricular wall thickness is consistent with mild concentric hypertrophy.  · Mild to moderate mitral valve regurgitation is present with an eccentric jet noted.  · Mild to moderate tricuspid valve regurgitation is present.  · Estimated right ventricular systolic pressure from tricuspid  regurgitation is moderately elevated (45-55 mmHg).  · Saline test results are negative for right to left atrial level shunt.      I have reviewed the medications:  Scheduled Meds:albuterol sulfate HFA, 2 puff, Inhalation, 4x Daily - RT  apixaban, 5 mg, Oral, Q12H  aspirin, 81 mg, Oral, Daily  atorvastatin, 80 mg, Oral, Nightly  bisacodyl, 10 mg, Rectal, Daily   And  senna-docusate sodium, 2 tablet, Oral, BID   And  polyethylene glycol, 17 g, Oral, Daily  furosemide, 20 mg, Intravenous, Once  furosemide, 40 mg, Oral, Daily  insulin detemir, 5 Units, Subcutaneous, Daily  insulin lispro, 0-7 Units, Subcutaneous, TID AC  lisinopril, 10 mg, Oral, Q24H  melatonin, 5 mg, Oral, Nightly  metoprolol tartrate, 50 mg, Oral, Q12H  miconazole, , Topical, Q12H  nystatin, , Topical, Q12H  pantoprazole, 40 mg, Oral, Q AM  sodium chloride, 10 mL, Intravenous, Q12H  sodium chloride, 10 mL, Intravenous, Q12H      Continuous Infusions:   PRN Meds:.•  acetaminophen **OR** acetaminophen **OR** acetaminophen  •  benzonatate  •  senna-docusate sodium **AND** polyethylene glycol **AND** bisacodyl **AND** bisacodyl  •  dextrose  •  dextrose  •  glucagon (human recombinant)  •  magnesium sulfate **OR** magnesium sulfate **OR** magnesium sulfate  •  metoprolol tartrate  •  ondansetron **OR** ondansetron  •  potassium chloride **OR** potassium chloride **OR** potassium chloride  •  senna-docusate sodium **AND** [DISCONTINUED] polyethylene glycol **AND** [DISCONTINUED] bisacodyl **AND** [DISCONTINUED] bisacodyl  •  sodium chloride  •  sodium chloride    Assessment/Plan   Assessment & Plan     Active Hospital Problems    Diagnosis  POA   • **Suspected subacute cerebrovascular accident (CVA) [R09.89]  Yes   • Elevated serum creatinine [R79.89]  Yes   • Paroxysmal atrial fibrillation (HCC) [I48.0]  Yes   • COVID-19 virus detected [U07.1]  Yes   • Right sided weakness [R53.1]  Yes   • T2DM (type 2 diabetes mellitus) (HCC) [E11.9]  Yes   • Essential  hypertension [I10]  Yes   • Acute on chronic diastolic CHF (congestive heart failure) (HCC) [I50.33]  Yes   • History of MRSA infection [Z86.14]  Yes      Resolved Hospital Problems   No resolved problems to display.        Brief Hospital Course to date:  Otilia Givens is a 71 y.o. female with hx of HTN, DM2, PAF, GERD, TIA, and chronic venous stasis with hx of MRSA leg wounds who presents from OSH due to right sided weakness and slurred speech. Also complained of a mild cough. Apparently her right sided weakness has been going on for 2 weeks. At the OSH, CT head was negative, NIH was 6. COVID-19 was positive. Transferred to [facility] for Neurology evaluation.    This patient's problems and plans were partially entered by my partner and updated as appropriate by me 12/06/21.      Right sided weakness and slurred speech  Probable subacute CVA  Hx of TIA  --CT head and CT perfusion negative. CTA head/neck showed moderate to high-grade short segment stenosis in the proximal right P2 segment, mild stenosis of proximal left posterior M2 branch.  --Has hx of recent diagnosis of Afib and had been taking Plavix at home.  --MRI brain ordered but patient states she is very claustrophobic and even with offering pre-medication, she refused to have it done.  --Echo no evidence of PFO.  --Continue ASA, high dose statin.  --Has been started on Eliquis, Plavix discontinued.  --Neurology has signed off.  --PT/OT/SLP.  Needs placement.  - remains stable with minimal right sided movement and limited speech     Elevated troponin  Acute on chronic diastolic CHF  Pulmonary HTN  --Recent admission at [facility] for CHF exacerbation? Data deficit.  --Echo here reviewed: EF 52%, grade I diastolic dysfunction, mild to moderate MR and TR, moderate pulmonary HTN with RVSP 45-55 mmHg.  --s/p 20 mg IV Lasix x 1.  --Repeat troponin with normal range.  --Cardiology has seen. Started Eliquis, Metoprolol bid for Afib (previously on Coreg at  home).  --Tolerating home dose of Lasix.  --will resume ACE today     COVID-19 positive  --Mild symptoms.  --Negative procal, normal lactate.  --CXR with mild patchy disease noted and cardiomegaly.  --Fluctuates between room air and 2 L, has not qualified for treatment.    Elevated Cr--Probably CKD 3  --Cr 1.47 on admission,ranging 1.5-1.6 currently   - Renal ultrasound with moderately atrophic right kidney, otherwise unremarkable  - resume ACE     PAF  --Recent diagnosis, has been taking Plavix..  --Cardiology has seen and started on Eliquis and Metoprolol. Stopped Plavix.  --Echo results as above.  --Currently in NSR.     HTN  --resume ACE  -- stop hydralizine and watch BP  --Coreg switched to Metoprolol as above.     DM2  --HbA1 8.3%.  -- continue current insulin regimen, no changes today     Hx of recurrent MRSA wound infection BLE  --Follows with wound care, has dressing changes/wraps every 2 days.  --Recently completed antibiotic course ~1-2 weeks ago, cannot remember name of antibiotic.  --PT wound following here.     DVT prophylaxis:  Medical and mechanical DVT prophylaxis orders are present.       AM-PAC 6 Clicks Score (PT): 6 (12/05/21 0800)    Disposition: I expect the patient to be discharged to SNF, TBD--apparently can not take until 12/9/21 due to COVID positive    CODE STATUS:   Code Status and Medical Interventions:   Ordered at: 11/29/21 0329     Code Status (Patient has no pulse and is not breathing):    CPR (Attempt to Resuscitate)     Medical Interventions (Patient has pulse or is breathing):    Full Support       Luís Galindo MD  12/06/21

## 2021-12-06 NOTE — PLAN OF CARE
Goal Outcome Evaluation:  Plan of Care Reviewed With: patient        Progress: declining  Outcome Summary: Pt demonstrated worsened cognition and required increased assist with mobility and sitting balance today. Pt with speech more slurred and oriented to person only today compared to Ox3 last session. She was Dep x2 for rolling and required Max A x2 for static sitting balance (Min A last session). Overall pt had more difficulty attend to tasks and following commands. RN immediately notified. Will continue per PT POC.

## 2021-12-06 NOTE — CASE MANAGEMENT/SOCIAL WORK
Continued Stay Note  [facility]     Patient Name: Otilia Givens  MRN: [id number]  Today's Date: 12/6/2021    Admit Date: 11/29/2021     Discharge Plan     Row Name 12/06/21 1420       Plan    Plan update    Patient/Family in Agreement with Plan yes    Plan Comments Per MDR, ok to discharge as previously planned on 12/9.  Spoke with patient daughter, Rema, regarding discharge plan and advised that per hospitalist plan to discharge on Thursday.  Called [facility] and left voicemail for Snadhya in admissions with name and callback number.  CM following.  Patient plan is to discharge to [facility] via ambualnce scheduled for 12/9 at 0900.    Final Discharge Disposition Code 03 - skilled nursing facility (SNF)               Discharge Codes    No documentation.               Expected Discharge Date and Time     Expected Discharge Date Expected Discharge Time    Dec 9, 2021             Elaina Madrid RN

## 2021-12-06 NOTE — PLAN OF CARE
Goal Outcome Evaluation:  Plan of Care Reviewed With: patient        Progress: no change   SLP re-evaluation and treatment completed. Will continue to address dysphagia and dysarthria. Please see note for further details and recommendations.

## 2021-12-07 NOTE — PROGRESS NOTES
"Pharmacy Consult-Vancomycin Dosing  Otilia Givens is a  71 y.o. female receiving vancomycin therapy.     Indication: Pneumonia, covid and worsening bronchopneumonia  Consulting Provider: Delma Nj APRN  ID Consult: no    Goal AUC: 400 - 600 mg/L*hr    Current Antimicrobial Therapy  Vancomycin day 1  Zosyn 3.375 q8h       Allergies  Allergies as of 11/29/2021    (No Known Allergies)       Labs    Results from last 7 days   Lab Units 12/05/21  1021 12/04/21  0533 12/02/21 0527   BUN mg/dL 49* 43* 42*   CREATININE mg/dL 1.66* 1.54* 1.73*       Results from last 7 days   Lab Units 12/05/21  1021 12/02/21 0527 12/01/21  0923   WBC 10*3/mm3 5.49 4.67 5.45       Evaluation of Dosing     Last Dose Received in the ED/Outside Facility: n/a  Is Patient on Dialysis or Renal Replacement: n/a    Ht - 170 cm (66.93\")  Wt - 113 kg (250 lb)    Estimated Creatinine Clearance: 39.7 mL/min (A) (by C-G formula based on SCr of 1.66 mg/dL (H)).    Intake & Output (last 3 days)         12/04 0701  12/05 0700 12/05 0701  12/06 0700 12/06 0701  12/07 0700 12/07 0701  12/08 0700    P.O. 118  118     Total Intake(mL/kg) 118 (1)  118 (1)     Urine (mL/kg/hr) 450 (0.2) 100 (0)      Stool 0 0      Total Output 450 100      Net -332 -100 +118             Urine Unmeasured Occurrence 3 x  2 x     Stool Unmeasured Occurrence 3 x 1 x 1 x             Microbiology and Radiology  Microbiology Results (last 10 days)       Procedure Component Value - Date/Time    Blood Culture - Blood, Hand, Right [[id number]]  (Normal) Collected: 11/29/21 0536    Lab Status: Final result Specimen: Blood from Hand, Right Updated: 12/04/21 0615     Blood Culture No growth at 5 days    COVID PRE-OP / PRE-PROCEDURE SCREENING ORDER (NO ISOLATION) - Swab, Nasopharynx [[id number]]  (Abnormal) Collected: 11/29/21 0500    Lab Status: Final result Specimen: Swab from Nasopharynx Updated: 11/29/21 0606    Narrative:      The following orders were created for panel " order COVID PRE-OP / PRE-PROCEDURE SCREENING ORDER (NO ISOLATION) - Swab, Nasopharynx.  Procedure                               Abnormality         Status                     ---------                               -----------         ------                     Respiratory Panel PCR w/...[[id number]]  Abnormal            Final result                 Please view results for these tests on the individual orders.    Respiratory Panel PCR w/COVID-19(SARS-CoV-2) FAYE/KRYSTIAN/ANAID/PAD/COR/MAD/ALOK In-House, NP Swab in UTM/VTM, 3-4 HR TAT - Swab, Nasopharynx [[id number]]  (Abnormal) Collected: 11/29/21 0500    Lab Status: Final result Specimen: Swab from Nasopharynx Updated: 11/29/21 0606     ADENOVIRUS, PCR Not Detected     Coronavirus 229E Not Detected     Coronavirus HKU1 Not Detected     Coronavirus NL63 Not Detected     Coronavirus OC43 Not Detected     COVID19 Detected     Human Metapneumovirus Not Detected     Human Rhinovirus/Enterovirus Not Detected     Influenza A PCR Not Detected     Influenza B PCR Not Detected     Parainfluenza Virus 1 Not Detected     Parainfluenza Virus 2 Not Detected     Parainfluenza Virus 3 Not Detected     Parainfluenza Virus 4 Not Detected     RSV, PCR Not Detected     Bordetella pertussis pcr Not Detected     Bordetella parapertussis PCR Not Detected     Chlamydophila pneumoniae PCR Not Detected     Mycoplasma pneumo by PCR Not Detected    Narrative:      In the setting of a positive respiratory panel with a viral infection PLUS a negative procalcitonin without other underlying concern for bacterial infection, consider observing off antibiotics or discontinuation of antibiotics and continue supportive care. If the respiratory panel is positive for atypical bacterial infection (Bordetella pertussis, Chlamydophila pneumoniae, or Mycoplasma pneumoniae), consider antibiotic de-escalation to target atypical bacterial infection.    Urine Culture - Urine, Urine, Clean Catch [[id number]] Collected:  11/29/21 0419    Lab Status: Final result Specimen: Urine, Clean Catch Updated: 11/30/21 1503     Urine Culture 50,000 CFU/mL Mixed Jacey Isolated    Narrative:      Specimen contains mixed organisms of questionable pathogenicity which indicates contamination with commensal jacey.  Further identification is unlikely to provide clinically useful information.  Suggest recollection.    Blood Culture - Blood, Arm, Right [[id number]]  (Normal) Collected: 11/29/21 0337    Lab Status: Final result Specimen: Blood from Arm, Right Updated: 12/04/21 0400     Blood Culture No growth at 5 days            Reported Vancomycin Levels                         [employer] AUC Calculation:    Current AUC:    mg/L*hr    Predicted Steady State AUC on Current Dose: 499 mg/L*hr  _________________________________    Predicted Steady State AUC on New Dose:   mg/L*hr    Assessment/Plan:  Pharmacy to dose Vancomycin for pneumonia, covid and worsening bronchopneumonia.  Goal -600 mg/L*hr   12/5 Scr - 1.66  12/5 WBC - 5.49  24hr tmax - 96.6    Initiate loading dose of Vancomycin 2250 mg (~20mg/kg) followed by maintenance dose of [id number] IV q24h 12/8 @ 1200   Vancomycin level prior to third dose 12/9 @ 1100  Monitor renal function, clinical status, infusion related reactions and BMP daily for 3 days   Pharmacy to follow     Thank you   Michelle Grayson, Pharmacy Intern  12/7/2021  14:36 EST

## 2021-12-07 NOTE — PLAN OF CARE
Goal Outcome Evaluation:           Progress: improving   Pt is currently on 6L NC and NSR on tele. She did not follow commands or speak this shift. She was able to take meds crushed in applesauce, but this took a lot of prompting and coaching. She had Zosyn, and Vanc is currently running. Will cont to monitor

## 2021-12-07 NOTE — PLAN OF CARE
Goal Outcome Evaluation:  Plan of Care Reviewed With: patient        Progress: improving  Outcome Summary: Pt's BLE edema appears to be resolved, PT removed BLE MLW this session and educated pt to continue BLE elevation to assist with venous return. Pt's R posterior ankle blister also demonstrating good wound healing with reepithelialization of wound edges. PT recommending continued xeroform/optifoam dressing changes every 2-3 days until resurfaced. Pt with no remaining skilled acute PT wound care needs at this time, PT wound care will sign off. Please contact PT wound care if pt's status changes.

## 2021-12-07 NOTE — SIGNIFICANT NOTE
12/07/21 1439   SLP Deferred Reason   SLP Deferred Reason Routine  (With other service when attempted to see this PM. Will f/u tomorrow for assessment of diet tolerance.)

## 2021-12-07 NOTE — PLAN OF CARE
Problem: Adult Inpatient Plan of Care  Goal: Plan of Care Review  Outcome: Ongoing, Progressing   Goal Outcome Evaluation:               VSS. 2LNC  Pt. Yelled out Help spontaneously throughout shift, doesn't want anything when I go to her. Slurred speech.  No right hand .  Will continue to monitor.

## 2021-12-07 NOTE — THERAPY DISCHARGE NOTE
Acute Care - Wound/Debridement Treatment Note/Discharge   [place]     Patient Name: Otilia Givens  : 1950  MRN: [id number]  Today's Date: 2021                    Admit Date: 2021    Visit Dx:    ICD-10-CM ICD-9-CM   1. Dysarthria  R47.1 784.51   2. Oral phase dysphagia  R13.11 787.21   3. COVID-19 virus detected  U07.1 079.89       Patient Active Problem List   Diagnosis   • Paroxysmal atrial fibrillation (HCC)   • COVID-19 virus detected   • Right sided weakness   • T2DM (type 2 diabetes mellitus) ([facility])   • Essential hypertension   • Personal history of transient ischemic attack (TIA), and cerebral infarction without residual deficits   • Acute on chronic diastolic CHF (congestive heart failure) ([facility])   • History of MRSA infection   • Coronary artery disease involving native coronary artery of native heart   • Suspected subacute cerebrovascular accident (CVA)   • Elevated serum creatinine        Past Medical History:   Diagnosis Date   • Afib (HCC)    • CHF (congestive heart failure) (HCC)    • Diabetes (HCC)    • GERD (gastroesophageal reflux disease)    • Hypertension    • Stroke (HCC)         Past Surgical History:   Procedure Laterality Date   • HYSTERECTOMY     • TOE AMPUTATION Left             Lymphedema     Row Name 21 1427             Subjective Pain    Able to rate subjective pain? no  -LH              Lymphedema Edema Assessment    Ptting Edema Category By severity  -LH      Pitting Edema Other (comment)  None  -LH              Skin Changes/Observations    Location/Assessment Lower Extremity  -LH      Lower Extremity Conditions bilateral:; clean; dry; fragile  -LH      Lower Extremity Color/Pigment bilateral:; erythema  -LH              Lymphedema Measurements    Measurement Type(s) Quick Girth  -LH      Quick Girth Areas Lower extremities  -LH              LLE Quick Girth (cm)    Mid foot 23.2 cm  -LH      Smallest ankle 20.2 cm  -LH      Largest calf 31.8 cm  -LH               RLE Quick Girth (cm)    Mid foot 22.1 cm  -      Smallest ankle 21.5 cm  -      Largest calf 31.3 cm  -      RLE Quick Girth Total 74.9  -              Compression/Skin Care    Compression/Skin Care skin care  -      Skin Care washed/dried; moisturizing lotion applied  -      Wrapping Comments BLE MLW removed as BLE edema appears to be fully resolved.  -            User Key  (r) = Recorded By, (t) = Taken By, (c) = Cosigned By    Initials Name Provider Type     Derik Ponce, PT Physical Therapist                WOUND DEBRIDEMENT  Total area of Debridement: ~6cm2  Debridement Site 1  Location- Site 1: BLE  Selective Debridement- Site 1: Wound Surface <20cmsq  Instruments- Site 1: tweezers  Excised Tissue Description- Site 1: moderate, slough, other (comment) (Dry, flaking crust)  Bleeding- Site 1: none               PT Assessment (last 12 hours)     PT Evaluation and Treatment     Row Name 12/07/21 [facility]7          Physical Therapy Time and Intention    Subjective Information no complaints  -     Document Type discharge treatment; wound care  -     Mode of Treatment physical therapy; individual therapy  -     Row Name 12/07/21 1427          Cognition    Affect/Mental Status (Cognitive) confused; low arousal/lethargic  -     Orientation Status (Cognition) disoriented to; person; place; situation; time; other (see comments)  Pt unable to verbally respond to questioning  -[facility] Name 12/07/21 1427          Pain    Additional Documentation Pain Scale: FACES Pre/Post-Treatment (Group)  -[facility] Name 12/07/21 1427          Pain Scale: FACES Pre/Post-Treatment    Pain: FACES Scale, Pretreatment 0-->no hurt  -     Posttreatment Pain Rating 0-->no hurt  -     Row Name             Wound 11/29/21 0320 Left lateral groin MASD (Moisture associated skin damage)    Wound - Properties Group Placement Date: 11/29/21  -BM Placement Time: 0320  -BM Present on Hospital Admission: Y  -BM Side: Left   -BM Orientation: lateral  -BM Location: groin  -BM Primary Wound Type: MASD  -BM     Retired Wound - Properties Group Date first assessed: 11/29/21  -BM Time first assessed: 0320  -BM Present on Hospital Admission: Y  -BM Side: Left  -BM Location: groin  -BM Primary Wound Type: MASD  -BM     Row Name 12/07/21 1427          Wound 12/02/21 1135 Right posterior leg Blisters    Wound - Properties Group Placement Date: 12/02/21  -LH Placement Time: 1135  -LH Present on Hospital Admission: Y  -LH Side: Right  -LH Orientation: posterior  -LH Location: leg  -LH Primary Wound Type: Blisters  -LH     Dressing Appearance dry; intact  -LH     Base moist; pink; red  -LH     Periwound dry; intact; pink  -LH     Periwound Temperature warm  -LH     Periwound Skin Turgor soft  -LH     Edges irregular; open  -LH     Wound Length (cm) 0.5 cm  -LH     Wound Width (cm) 0.3 cm  -LH     Wound Depth (cm) 0.1 cm  -LH     Drainage Characteristics/Odor serosanguineous  -LH     Drainage Amount scant  -LH     Care, Wound cleansed with; soap and water  -LH     Dressing Care petroleum-based; gauze; low-adherent; foam  Xeroform  -LH     Periwound Care dry periwound area maintained; cleansed with pH balanced cleanser  -LH     Retired Wound - Properties Group Date first assessed: 12/02/21  - Time first assessed: 1135  -LH Present on Hospital Admission: Y  -LH Side: Right  -LH Location: leg  -LH Primary Wound Type: Blisters  -LH     Row Name             Wound 12/04/21 0800 Left medial gluteal MASD (Moisture associated skin damage)    Wound - Properties Group Placement Date: 12/04/21  -MM Placement Time: 0800  -MM Side: Left  -MM Orientation: medial  -MM Location: gluteal  -MM Primary Wound Type: MASD  -MM Additional Comments: noted on first assessment WOC consulted  -MM     Retired Wound - Properties Group Date first assessed: 12/04/21  -MM Time first assessed: 0800  -MM Side: Left  -MM Location: gluteal  -MM Primary Wound Type: MASD  -MM  Additional Comments: noted on first assessment WOC consulted  -MM     Row Name 12/07/21 1427          Coping    Observed Emotional State anxious  -     Trust Relationship/Rapport care explained  -     Row Name 12/07/21 1427          Plan of Care Review    Plan of Care Reviewed With patient  -     Progress improving  -     Outcome Summary Pt's BLE edema appears to be resolved, PT removed BLE MLW this session and educated pt to continue BLE elevation to assist with venous return. Pt's R posterior ankle blister also demonstrating good wound healing with reepithelialization of wound edges. PT recommending continued xeroform/optifoam dressing changes every 2-3 days until resurfaced. Pt with no remaining skilled acute PT wound care needs at this time, PT wound care will sign off. Please contact PT wound care if pt's status changes.  -     Row Name 12/07/21 1427          Wound Care Goal 1 (PT)    Wound Care Goal 1 (PT) Decrease BLE edema by 50% to improve skin integrity and function.  -     Progress/Outcome (Wound Care Goal 1, PT) goal met  -     Row Name 12/07/21 1427          Positioning and Restraints    Pre-Treatment Position in bed  -     Post Treatment Position bed  -     In Bed supine; call light within reach; encouraged to call for assist  -     Row Name 12/07/21 1427          Therapy Assessment/Plan (PT)    Criteria for Skilled Interventions Met (PT) no; no problems identified which require skilled intervention  -           User Key  (r) = Recorded By, (t) = Taken By, (c) = Cosigned By    Initials Name Provider Type     Patrick Jenkins, RN Registered Nurse     Kristi Brown RN Registered Nurse     Derik Ponce, PT Physical Therapist              Physical Therapy Education                 Title: PT OT SLP Therapies (In Progress)     Topic: Physical Therapy (In Progress)     Point: Mobility training (In Progress)     Learning Progress Summary           Patient Acceptance, E, NR  by NS at 12/6/2021 1544    Nonacceptance, E,TB, NR,NL by  at 12/3/2021 1049    Acceptance, E, VU,NR by NS at 12/2/2021 1315    Acceptance, E, VU,NR by NS at 11/29/2021 1115                   Point: Home exercise program (In Progress)     Learning Progress Summary           Patient Acceptance, E, NR by NS at 12/6/2021 1544    Nonacceptance, E,TB, NR,NL by  at 12/3/2021 1049    Acceptance, E, VU,NR by NS at 12/2/2021 1315    Acceptance, E, VU,NR by NS at 11/29/2021 1115                   Point: Body mechanics (In Progress)     Learning Progress Summary           Patient Acceptance, E, NR by NS at 12/6/2021 1544    Nonacceptance, E,TB, NR,NL by  at 12/3/2021 1049    Acceptance, E, VU,NR by NS at 12/2/2021 1315    Acceptance, E, VU,NR by NS at 11/29/2021 1115                   Point: Precautions (In Progress)     Learning Progress Summary           Patient Acceptance, E, NR by NS at 12/6/2021 1544    Nonacceptance, E,TB, NR,NL by  at 12/3/2021 1049    Acceptance, E, VU,NR by NS at 12/2/2021 1315    Acceptance, E, VU,NR by NS at 11/29/2021 1115                               User Key     Initials Effective Dates Name Provider Type Discipline     06/16/21 -  Adriana Chavez RN Registered Nurse Nurse    NS 06/16/21 -  Isela Hathaway, PT Physical Therapist PT               PT Rehab Goals     Row Name 12/07/21 1427             Wound Care Goal 1 (PT)    Wound Care Goal 1 (PT) Decrease BLE edema by 50% to improve skin integrity and function.  -      Progress/Outcome (Wound Care Goal 1, PT) goal met  -            User Key  (r) = Recorded By, (t) = Taken By, (c) = Cosigned By    Initials Name Provider Type Discipline     Derik Ponce, PT Physical Therapist PT                Recommendation and Plan  Anticipated Discharge Disposition (PT): skilled nursing facility  Planned Therapy Interventions (PT): wound care  Therapy Frequency (PT): daily    Plan of Care Reviewed With: patient   Progress:  improving  Outcome Summary: Pt's BLE edema appears to be resolved, PT removed BLE MLW this session and educated pt to continue BLE elevation to assist with venous return. Pt's R posterior ankle blister also demonstrating good wound healing with reepithelialization of wound edges. PT recommending continued xeroform/optifoam dressing changes every 2-3 days until resurfaced. Pt with no remaining skilled acute PT wound care needs at this time, PT wound care will sign off. Please contact PT wound care if pt's status changes.                  Time Calculation   PT Charges     Row Name 12/07/21 1501             Time Calculation    Start Time 1427  -LH      PT Goal Re-Cert Due Date 12/09/21  -              Timed Charges    87314 - PT Self Care/Mgmt Minutes 15  -LH              Total Minutes    Timed Charges Total Minutes 15  -LH       Total Minutes 15  -LH            User Key  (r) = Recorded By, (t) = Taken By, (c) = Cosigned By    Initials Name Provider Type     Derik Ponce, PT Physical Therapist                Therapy Charges for Today     Code Description Service Date Service Provider Modifiers Qty    [id number]  PT SELF CARE/MGMT/TRAIN EA 15 MIN 12/7/2021 Derik Ponce, PT GP 1            PT G-Codes  Outcome Measure Options: AM-PAC 6 Clicks Daily Activity (OT)  AM-PAC 6 Clicks Score (PT): 6  AM-PAC 6 Clicks Score (OT): 7  Modified Morgan Scale: 5 - Severe disability.  Bedridden, incontinent, and requiring constant nursing care and attention.       PT Discharge Summary  Anticipated Discharge Disposition (PT): skilled nursing facility  Reason for Discharge: All goals achieved  Outcomes Achieved: Able to achieve all goals within established timeline        Derik Ponce PT  12/7/2021

## 2021-12-07 NOTE — THERAPY TREATMENT NOTE
Patient Name: Otilia Givens  : 1950    MRN: [id number]                              Today's Date: 2021       Admit Date: 2021    Visit Dx:     ICD-10-CM ICD-9-CM   1. Dysarthria  R47.1 784.51   2. Oral phase dysphagia  R13.11 787.21   3. COVID-19 virus detected  U07.1 079.89     Patient Active Problem List   Diagnosis   • Paroxysmal atrial fibrillation (HCC)   • COVID-19 virus detected   • Right sided weakness   • T2DM (type 2 diabetes mellitus) (HCC)   • Essential hypertension   • Personal history of transient ischemic attack (TIA), and cerebral infarction without residual deficits   • Acute on chronic diastolic CHF (congestive heart failure) (HCC)   • History of MRSA infection   • Coronary artery disease involving native coronary artery of native heart   • Suspected subacute cerebrovascular accident (CVA)   • Elevated serum creatinine     Past Medical History:   Diagnosis Date   • Afib (HCC)    • CHF (congestive heart failure) (HCC)    • Diabetes (HCC)    • GERD (gastroesophageal reflux disease)    • Hypertension    • Stroke (HCC)      Past Surgical History:   Procedure Laterality Date   • HYSTERECTOMY     • TOE AMPUTATION Left       General Information     Row Name 21          OT Time and Intention    Document Type therapy note (daily note)  -MR     Mode of Treatment occupational therapy  -MR     Row Name 21          General Information    Patient Profile Reviewed yes  -MR     Existing Precautions/Restrictions fall; other (see comments)  R sided weakness/neglect  -MR     Barriers to Rehab medically complex  -MR     Row Name 21          Cognition    Orientation Status (Cognition) disoriented to; person; place; situation; time; other (see comments)  unable to follow commands or tell therapist her name  -MR     Row Name 21          Safety Issues, Functional Mobility    Safety Issues Affecting Function (Mobility) ability to follow commands; insight into  deficits/self-awareness; problem-solving; safety precaution awareness; safety precautions follow-through/compliance; sequencing abilities  -MR     Impairments Affecting Function (Mobility) balance; cognition; coordination; endurance/activity tolerance; motor planning; strength; muscle tone abnormal; postural/trunk control; range of motion (ROM)  -MR     Cognitive Impairments, Mobility Safety/Performance attention; awareness, need for assistance; safety precaution awareness; safety precaution follow-through; insight into deficits/self-awareness; sequencing abilities  -MR     Comment, Safety Issues/Impairments (Mobility) Pt unable to follow commands this date. Last session pt was able to follow commands, tell therapist her name and complete sentences.  -MR           User Key  (r) = Recorded By, (t) = Taken By, (c) = Cosigned By    Initials Name Provider Type    Candace Noyola, OT Occupational Therapist               Lymphedema     Row Name 12/04/21 1402             Subjective Pain    Able to rate subjective pain? no  -LH      Recorded by [LH] Derik Ponce, PT              Lymphedema Edema Assessment    Ptting Edema Category By severity  -      Pitting Edema Mild; Other (comment)  Mild to trace  -LH      Recorded by [LH] Derik Ponce, PT              Skin Changes/Observations    Location/Assessment Lower Extremity  -      Lower Extremity Conditions bilateral:; clean; dry; hairless; crust  -      Lower Extremity Color/Pigment bilateral:; erythema  -LH      Recorded by [LH] Derik Ponce, PT              Compression/Skin Care    Compression/Skin Care skin care; wrapping location  -      Skin Care washed/dried; lotion applied  -      Wrapping Location lower extremity  -      Wrapping Location LE bilateral:; foot to knee  -      Wrapping Comments BLE size 4/5 compressogrip applied doubled and overlapped for gradient compression.  -LH      Recorded by [LH] Derik Ponce, PT            User Strong   (r) = Recorded By, (t) = Taken By, (c) = Cosigned By    Initials Name Effective Dates    LH Derik Ponce D, PT 09/21/21 -                Mobility/ADL's     Row Name 12/07/21 0931          Bed Mobility    Bed Mobility other (see comments)  Repositioning in the bed. Pt demonstrating L lateral positioning of shoulders. DEP for positioning of shoulders and hips to midline in bed.  -MR     Assistive Device (Bed Mobility) draw sheet; head of bed elevated  -MR     Row Name 12/07/21 0931          Transfers    Comment (Transfers) OOB/EOB deferred d/t functional decline and cognitive status  -MR     Row Name 12/07/21 0931          Activities of Daily Living    BADL Assessment/Intervention grooming  -MR     Row Name 12/07/21 0931          Grooming Assessment/Training    [place] Level (Grooming) wash face, hands; dependent (less than 25% patient effort); verbal cues; nonverbal cues (demo/gesture)  -MR     Position (Grooming) sitting up in bed  -MR           User Key  (r) = Recorded By, (t) = Taken By, (c) = Cosigned By    Initials Name Provider Type    Candace Noyola, CASSIDY Occupational Therapist               Obj/Interventions     Row Name 12/07/21 0933          Therapeutic Exercise    Therapeutic Exercise shoulder; elbow/forearm; other (see comments)  Attempted BUE ther ex. Pt unable to follow commands with the LUE this date. Previously when seen 4 days ago pt followed all commands with AAROM of the LUE. No  present in the RUE  -MR           User Key  (r) = Recorded By, (t) = Taken By, (c) = Cosigned By    Initials Name Provider Type    Candace Noyola, CASSIDY Occupational Therapist               Goals/Plan    No documentation.                Clinical Impression     Row Name 12/07/21 0935          Pain Assessment    Additional Documentation Pain Scale: FACES Pre/Post-Treatment (Group)  -MR     Row Name 12/07/21 0935          Pain Scale: FACES Pre/Post-Treatment    Pain: FACES Scale, Pretreatment 0-->no hurt  -MR      Posttreatment Pain Rating 0-->no hurt  -MR     Pain Location - Side other (see comments)  -MR     Pre/Posttreatment Pain Comment Asymptomatic. Screamed out help throughout session. Unable to respond about needs/wants  -MR Veliz Name 12/07/21 0935          Plan of Care Review    Plan of Care Reviewed With patient  -MR     Progress declining  -MR     Outcome Summary Pt demonstrating functional and cognitive decline from 4 days ago when therapist treated pt. Previously pt could follow commands for ther ex, verbalize name and full sentences about being discouraged with her R side. Previously SUA for grooming, DEP this date. DEP for repositioning in the bed. Unable to follow commands to squeeze L hand this date. RN notified. Continue to progress per current POC as able. Recommendation upon d/c for SNF.  -MR Veliz Name 12/07/21 0935          Therapy Assessment/Plan (OT)    Rehab Potential (OT) fair, will monitor progress closely  -MR Veliz Name 12/07/21 0935          Therapy Plan Review/Discharge Plan (OT)    Anticipated Discharge Disposition (OT) skilled nursing facility  -MR Veliz Name 12/07/21 0935          Vital Signs    Pre Systolic BP Rehab 118  -MR     Pre Treatment Diastolic BP 59  -MR     Post Systolic BP Rehab 131  -MR     Post Treatment Diastolic BP 59  -MR     Pretreatment Heart Rate (beats/min) 74  -MR     Posttreatment Heart Rate (beats/min) 87  -MR     Pre SpO2 (%) 95  -MR     O2 Delivery Pre Treatment nasal cannula  -MR     O2 Delivery Intra Treatment nasal cannula  -MR     Post SpO2 (%) 95  -MR     O2 Delivery Post Treatment nasal cannula  -MR     Pre Patient Position Supine  -MR     Intra Patient Position Supine  -MR     Post Patient Position Supine  -MR Veliz Name 12/07/21 0935          Positioning and Restraints    Pre-Treatment Position in bed  -MR     Post Treatment Position bed  -MR     In Bed notified nsg; supine; call light within reach; encouraged to call for assist; exit alarm on; RUE  elevated; legs elevated  -MR           User Key  (r) = Recorded By, (t) = Taken By, (c) = Cosigned By    Initials Name Provider Type    Candace Noyola, OT Occupational Therapist               Outcome Measures     Row Name 12/07/21 0940          How much help from another is currently needed...    Putting on and taking off regular lower body clothing? 1  -MR     Bathing (including washing, rinsing, and drying) 1  -MR     Toileting (which includes using toilet bed pan or urinal) 1  -MR     Putting on and taking off regular upper body clothing 1  -MR     Taking care of personal grooming (such as brushing teeth) 1  -MR     Eating meals 2  -MR     AM-PAC 6 Clicks Score (OT) 7  -MR     Row Name 12/07/21 0940          Functional Assessment    Outcome Measure Options AM-PAC 6 Clicks Daily Activity (OT)  -MR           User Key  (r) = Recorded By, (t) = Taken By, (c) = Cosigned By    Initials Name Provider Type    Candace Noyola, OT Occupational Therapist                Occupational Therapy Education                 Title: PT OT SLP Therapies (In Progress)     Topic: Occupational Therapy (In Progress)     Point: ADL training (In Progress)     Description:   Instruct learner(s) on proper safety adaptation and remediation techniques during self care or transfers.   Instruct in proper use of assistive devices.              Learning Progress Summary           Patient Acceptance, E, NL,NR by MR at 12/7/2021 0941    Acceptance, E, NL,NR by MR at 12/3/2021 1154    Nonacceptance, E,TB, NR,NL by  at 12/3/2021 1049    Acceptance, E,D, NR by ALICIA at 12/1/2021 1315    Comment: noted changes in strength, ROM and sensation, RUE TE, grooming initiation    Acceptance, E,D, VU,NR by ALICIA at 11/29/2021 1103    Comment: reason for consult, noted deficit, UE TE, but mobility                   Point: Home exercise program (In Progress)     Description:   Instruct learner(s) on appropriate technique for monitoring, assisting and/or progressing  therapeutic exercises/activities.              Learning Progress Summary           Patient Acceptance, E, NL,NR by MR at 12/3/2021 1154    Nonacceptance, E,TB, NR,NL by  at 12/3/2021 1049    Acceptance, E,D, NR by  at 12/1/2021 1315    Comment: noted changes in strength, ROM and sensation, RUE TE, grooming initiation    Acceptance, E,D, VU,NR by  at 11/29/2021 1103    Comment: reason for consult, noted deficit, UE TE, but mobility                   Point: Precautions (In Progress)     Description:   Instruct learner(s) on prescribed precautions during self-care and functional transfers.              Learning Progress Summary           Patient Acceptance, E, NL,NR by MR at 12/7/2021 0941    Acceptance, E, NL,NR by MR at 12/3/2021 1154    Nonacceptance, E,TB, NR,NL by  at 12/3/2021 1049                   Point: Body mechanics (In Progress)     Description:   Instruct learner(s) on proper positioning and spine alignment during self-care, functional mobility activities and/or exercises.              Learning Progress Summary           Patient Acceptance, E, NL,NR by MR at 12/7/2021 0941    Acceptance, E, NL,NR by  at 12/3/2021 1154    Nonacceptance, E,TB, NR,NL by  at 12/3/2021 1049    Acceptance, E,D, NR by  at 12/1/2021 1315    Comment: noted changes in strength, ROM and sensation, RUE TE, grooming initiation    Acceptance, E,D, VU,NR by  at 11/29/2021 1103    Comment: reason for consult, noted deficit, UE TE, but mobility                               User Key     Initials Effective Dates Name Provider Type Discipline     06/16/21 -  Samaria Mccabe, OT Occupational Therapist OT     06/16/21 -  Adriana Chavez, RN Registered Nurse Nurse     10/06/21 -  Candace Edmonds OT Occupational Therapist OT              OT Recommendation and Plan     Plan of Care Review  Plan of Care Reviewed With: patient  Progress: declining  Outcome Summary: Pt demonstrating functional and cognitive decline from 4  days ago when therapist treated pt. Previously pt could follow commands for ther ex, verbalize name and full sentences about being discouraged with her R side. Previously SUA for grooming, DEP this date. DEP for repositioning in the bed. Unable to follow commands to squeeze L hand this date. RN notified. Continue to progress per current POC as able. Recommendation upon d/c for SNF.     Time Calculation:    Time Calculation- OT     Row Name 12/07/21 0941             Time Calculation- OT    OT Start Time 0910  -MR      OT Received On 12/07/21  -      OT Goal Re-Cert Due Date 12/09/21  -MR              Timed Charges    02550 - OT Therapeutic Activity Minutes 11  -MR      37861 - OT Self Care/Mgmt Minutes 3  -MR              Total Minutes    Timed Charges Total Minutes 14  -MR       Total Minutes 14  -MR            User Key  (r) = Recorded By, (t) = Taken By, (c) = Cosigned By    Initials Name Provider Type    Cristela Noyola OT Occupational Therapist              Therapy Charges for Today     Code Description Service Date Service Provider Modifiers Qty    [id number]  OT THERAPEUTIC ACT EA 15 MIN 12/7/2021 Cristela Edmonds OT GO 1               CRISTELA EDMONDS OT  12/7/2021

## 2021-12-07 NOTE — PLAN OF CARE
Goal Outcome Evaluation:  Plan of Care Reviewed With: patient        Progress: declining  Outcome Summary: Pt demonstrating functional and cognitive decline from 4 days ago when therapist treated pt. Previously pt could follow commands for ther ex, verbalize name and full sentences about being discouraged with her R side. Previously SUA for grooming, DEP this date. DEP for repositioning in the bed. Unable to follow commands to squeeze L hand this date. RN notified. Continue to progress per current POC as able. Recommendation upon d/c for SNF.

## 2021-12-07 NOTE — PROGRESS NOTES
[facility] Medicine Services  PROGRESS NOTE    Patient Name: Otilia Givens  : 1950  MRN: [id number]    Date of Admission: 2021  Primary Care Physician: Shannon Aly MD    Subjective   Subjective     CC:  F/U CVA    HPI:  Patient seen resting up in bed awake.  Moaning.  No verbal interaction.  Will not follow commands.  No visitors at bedside.  Staff report she is not interacting as well as she had been prior.  Sats 89% on 4 L.    ROS:  Unable to obtain d/t limited verbalization.      Objective   Objective     Vital Signs:   Temp:  [96.1 °F (35.6 °C)-97.1 °F (36.2 °C)] 96.6 °F (35.9 °C)  Heart Rate:  [66-83] 75  Resp:  [9-20] 18  BP: ()/(59-70) 142/70  Flow (L/min):  [2-5] 4     Physical Exam:  Constitutional: chronically ill appearing.  Resting up in bed.  Awake but moaning.  HENT: NCAT, mucous membranes moist  Respiratory: Bilateral crackles, respiratory effort normal but on 4 LNC with sats 89-90%.  Cardiovascular: RRR, no murmurs, rubs, or gallops  Gastrointestinal: morbidly obese, Positive bowel sounds, soft, nontender, nondistended  Musculoskeletal: No bilateral ankle edema  Psychiatric: Unable to assess  Neurologic: Awake but not interactive.  Moaning.  Will not follow commands and no clear verbal goal.  Skin: No rashes      Results Reviewed:  LAB RESULTS:      Lab 21  1021 2123   WBC 5.49 4.67 5.45   HEMOGLOBIN 9.3* 12.7 12.4   HEMATOCRIT 28.6* 40.3 38.7   PLATELETS 188 162 161   MCV 81.9 86.1 85.1   CRP 5.83*  --  3.21*         Lab 21  1021 21  0533 21  0521  09   SODIUM 140 139 137 133*   POTASSIUM 4.1 4.7 4.6 4.5   CHLORIDE 106 105 102 101   CO2 24.0 21.0* 21.0* 23.0   ANION GAP 10.0 13.0 14.0 9.0   BUN 49* 43* 42* 38*   CREATININE 1.66* 1.54* 1.73* 1.88*   GLUCOSE 152* 198* 185* 182*   CALCIUM 8.2* 8.5* 8.2* 8.1*         Lab 21  0923   TOTAL PROTEIN 6.5   ALBUMIN 2.90*   GLOBULIN 3.6   ALT  (SGPT) 11   AST (SGOT) 21   BILIRUBIN 0.6   ALK PHOS 104                     Brief Urine Lab Results  (Last result in the past 365 days)      Color   Clarity   Blood   Leuk Est   Nitrite   Protein   CREAT   Urine HCG        12/01/21 1738             130.3               Microbiology Results Abnormal     Procedure Component Value - Date/Time    Blood Culture - Blood, Hand, Right [[id number]]  (Normal) Collected: 11/29/21 0536    Lab Status: Final result Specimen: Blood from Hand, Right Updated: 12/04/21 0615     Blood Culture No growth at 5 days    Blood Culture - Blood, Arm, Right [[id number]]  (Normal) Collected: 11/29/21 0337    Lab Status: Final result Specimen: Blood from Arm, Right Updated: 12/04/21 0400     Blood Culture No growth at 5 days    Urine Culture - Urine, Urine, Clean Catch [[id number]] Collected: 11/29/21 0419    Lab Status: Final result Specimen: Urine, Clean Catch Updated: 11/30/21 1503     Urine Culture 50,000 CFU/mL Mixed Jacey Isolated    Narrative:      Specimen contains mixed organisms of questionable pathogenicity which indicates contamination with commensal jacey.  Further identification is unlikely to provide clinically useful information.  Suggest recollection.          XR Chest 1 View    Result Date: 12/7/2021   EXAMINATION: XR CHEST 1 VW-  INDICATION: decreased sats, crackles, covid pt; R47.1-Dysarthria and anarthria; R13.11-Dysphagia, oral phase; U07.1-COVID-19  COMPARISON: Chest x-ray 11/29/2021  FINDINGS: Cardiac size enlarged. Bilateral pulmonary opacifications right greater than left with progressive involvement of airspace disease bronchopneumonia without pleural effusion.         Impression: Bilateral pulmonary opacifications right greater than left with progressive involvement of airspace disease bronchopneumonia without pleural effusion         Results for orders placed during the hospital encounter of 11/29/21    Adult Transthoracic Echo Complete W/ Cont if Necessary Per Protocol  (With Agitated Saline)    Interpretation Summary  · Left ventricular systolic function is normal. Estimated left ventricular EF = 52%.  · Left ventricular wall thickness is consistent with mild concentric hypertrophy.  · Mild to moderate mitral valve regurgitation is present with an eccentric jet noted.  · Mild to moderate tricuspid valve regurgitation is present.  · Estimated right ventricular systolic pressure from tricuspid regurgitation is moderately elevated (45-55 mmHg).  · Saline test results are negative for right to left atrial level shunt.      I have reviewed the medications:  Scheduled Meds:[START ON 12/9/2021] Pharmacy Consult, , Does not apply, Once  albuterol sulfate HFA, 2 puff, Inhalation, 4x Daily - RT  apixaban, 5 mg, Oral, Q12H  aspirin, 81 mg, Oral, Daily  atorvastatin, 80 mg, Oral, Nightly  bisacodyl, 10 mg, Rectal, Daily   And  senna-docusate sodium, 2 tablet, Oral, BID   And  polyethylene glycol, 17 g, Oral, Daily  furosemide, 20 mg, Intravenous, Once  furosemide, 40 mg, Oral, Daily  insulin detemir, 5 Units, Subcutaneous, Daily  insulin lispro, 0-7 Units, Subcutaneous, TID AC  lisinopril, 10 mg, Oral, Q24H  melatonin, 5 mg, Oral, Nightly  metoprolol tartrate, 50 mg, Oral, Q12H  miconazole, , Topical, Q12H  nystatin, , Topical, Q12H  pantoprazole, 40 mg, Oral, Q AM  piperacillin-tazobactam, 3.375 g, Intravenous, Once  piperacillin-tazobactam, 3.375 g, Intravenous, Q8H  sodium chloride, 10 mL, Intravenous, Q12H  sodium chloride, 10 mL, Intravenous, Q12H  [START ON 12/8/2021] vancomycin, 1,250 mg, Intravenous, Q24H  vancomycin, 2,250 mg, Intravenous, Once      Continuous Infusions:Pharmacy to dose vancomycin,       PRN Meds:.acetaminophen **OR** acetaminophen **OR** acetaminophen  •  benzonatate  •  senna-docusate sodium **AND** polyethylene glycol **AND** bisacodyl **AND** bisacodyl  •  dextrose  •  dextrose  •  glucagon (human recombinant)  •  magnesium sulfate **OR** magnesium sulfate **OR**  magnesium sulfate  •  metoprolol tartrate  •  ondansetron **OR** ondansetron  •  Pharmacy to dose vancomycin  •  potassium chloride **OR** potassium chloride **OR** potassium chloride  •  senna-docusate sodium **AND** [DISCONTINUED] polyethylene glycol **AND** [DISCONTINUED] bisacodyl **AND** [DISCONTINUED] bisacodyl  •  sodium chloride  •  sodium chloride    Assessment/Plan   Assessment & Plan     Active Hospital Problems    Diagnosis  POA   • **Suspected subacute cerebrovascular accident (CVA) [R09.89]  Yes   • Elevated serum creatinine [R79.89]  Yes   • Paroxysmal atrial fibrillation (HCC) [I48.0]  Yes   • COVID-19 virus detected [U07.1]  Yes   • Right sided weakness [R53.1]  Yes   • T2DM (type 2 diabetes mellitus) ([facility]) [E11.9]  Yes   • Essential hypertension [I10]  Yes   • Acute on chronic diastolic CHF (congestive heart failure) ([facility]) [I50.33]  Yes   • History of MRSA infection [Z86.14]  Yes      Resolved Hospital Problems   No resolved problems to display.        Brief Hospital Course to date:  Otilia Givens is a 71 y.o. female with hx of HTN, DM2, PAF, GERD, TIA, and chronic venous stasis with hx of MRSA leg wounds who presents from OSH due to right sided weakness and slurred speech. Also complained of a mild cough. Apparently her right sided weakness has been going on for 2 weeks. At the OSH, CT head was negative, NIH was 6. COVID-19 was positive. Transferred to [facility] for Neurology evaluation.    This patient's problems and plans were partially entered by my partner and updated as appropriate by me 12/07/21.    Assessment/plan:  Patient is new to me today    COVID-19 positive  Bilateral opacifications/bronchopneumonia.  --Symptoms overall worsening today.  Decreased responsiveness and moaning.  Sats decreased and increasing oxygen demands.  --Initially chest x-ray with mild patchy disease and cardiomegaly.  --We will repeat labs and chest x-ray.  Addendum: Labs and x-rays repeated.  Labs pending however chest  x-ray revealed lateral pulmonary opacifications right greater than left with progressive involvement of airspace disease bronchopneumonia without pleural effusion.  --Check blood cultures, stat labs as noted.  --MRSA PCR  --Start vancomycin and Zosyn  --We will also initiate remdesivir and dexamethasone today 12/7 due to worsening.    Right sided weakness and slurred speech  Probable subacute CVA  Hx of TIA  --CT head and CT perfusion negative. CTA head/neck showed moderate to high-grade short segment stenosis in the proximal right P2 segment, mild stenosis of proximal left posterior M2 branch.  --Has hx of recent diagnosis of Afib and had been taking Plavix at home.  --MRI brain ordered but patient states she is very claustrophobic and even with offering pre-medication, she refused to have it done.  --Echo no evidence of PFO.  --Continue ASA, high dose statin.  --Has been started on Eliquis, Plavix discontinued.  --Neurology has signed off.  --PT/OT/SLP.  Needs placement.  CM following.  - remains stable with minimal right sided movement and limited speech  --Decreased verbal interaction today.  Moaning.  Obvious worsening neurologic focal symptoms.     Elevated troponin  Acute on chronic diastolic CHF  Pulmonary HTN  --Recent admission at [facility] for CHF exacerbation? Data deficit.  --Echo here reviewed: EF 52%, grade I diastolic dysfunction, mild to moderate MR and TR, moderate pulmonary HTN with RVSP 45-55 mmHg.  --s/p 20 mg IV Lasix x 1.  --Repeat troponin with normal range.  --Cardiology has seen. Started Eliquis, Metoprolol bid for Afib (previously on Coreg at home).  --Tolerating home dose of Lasix.  --Resumed ACE yesterday 12/6.  BP stable.  --Giving additional Lasix 40 mg IV x1 today due to worsening lung sounds.  BP stable and can tolerate.     Elevated Cr--Probably CKD 3  --Cr 1.47 on admission,ranging 1.5-1.6 currently   - Renal ultrasound with moderately atrophic right kidney, otherwise  unremarkable  - resumed ACE 12/6     PAF  --Recent diagnosis, has been taking Plavix..  --Cardiology has seen and started on Eliquis and Metoprolol. Stopped Plavix.  --Echo results as above.  --Currently in NSR.     HTN  --resumed ACE  -- stopped hydralizine prior, watch BP.  Stable today.  --Coreg switched to Metoprolol as above.     DM2  --HbA1 8.3%.  --Glucoses remain 132-175.  Not eating today much.   -- continue current insulin regimen, no changes today     Hx of recurrent MRSA wound infection BLE  --Follows with wound care, has dressing changes/wraps every 2 days.  --Recently completed antibiotic course ~1-2 weeks ago, prior could not remember name of antibiotic.  --PT wound following here.       Daily Care Communication  Due to current limited visitation policies, an attempt will be made daily to update patient's identified best point-of-contact(s)   Contact: Rema   Relation: Daughter   Time of communication: 1350 p.m.   Notes (if applicable): Called and spoke with patient's daughter Rema regarding worsening of her condition.  Explained appears to be worsening today.  Checking stat chest x-ray and labs.  X-ray revealed bilateral bronchopneumonia.  Starting antibiotic.  Also will initiate remdesivir and dexamethasone.       DVT prophylaxis:  Medical and mechanical DVT prophylaxis orders are present.       AM-PAC 6 Clicks Score (PT): 6 (12/07/21 0804)    Disposition: I expect the patient to be discharged to SNF, TBD--apparently can not take until 12/9/21 due to COVID positive.      Tues 12/7--Pt s/s now worsening.  Will cancel transfer for now.  She will likely need to complete full course of remdesivir prior to transfer.  Follow closely for further disposition plans.    CODE STATUS:   Code Status and Medical Interventions:   Ordered at: 11/29/21 0329     Code Status (Patient has no pulse and is not breathing):    CPR (Attempt to Resuscitate)     Medical Interventions (Patient has pulse or is breathing):     Full Support       Delma Nj, APRN  12/07/21

## 2021-12-08 PROBLEM — J96.01 ACUTE HYPOXEMIC RESPIRATORY FAILURE DUE TO COVID-19 (HCC): Status: ACTIVE | Noted: 2021-01-01

## 2021-12-08 NOTE — DISCHARGE SUMMARY
[facility] Medicine Services  DISCHARGE TO INPATIENT HOSPICE    Patient Name: Otilia Givens  : 1950  MRN: [id number]    Date of Admission: 2021  Date of Discharge:  .[facility]  Primary Care Physician: Shannon Aly MD    Consults     Date and Time Order Name Status Description    2021  7:51 AM Inpatient Palliative Care MD Consult Completed     2021  5:16 AM Inpatient Cardiology Consult Completed     2021  2:04 AM Inpatient Neurology Consult Stroke Completed         Hospital Course     Presenting Problem:   CVA (cerebral vascular accident) ([facility]) [I63.9]  Stroke (cerebrum) ([facility]) [I63.9]    Active Hospital Problems    Diagnosis  POA   • **Suspected subacute cerebrovascular accident (CVA) [R09.89]  Yes   • Elevated serum creatinine [R79.89]  Yes   • Paroxysmal atrial fibrillation ([facility]) [I48.0]  Yes   • Acute hypoxemic respiratory failure due to COVID-19 ([facility]) [U07.1, J96.01]  Yes   • Right sided weakness [R53.1]  Yes   • T2DM (type 2 diabetes mellitus) ([facility]) [E11.9]  Yes   • Essential hypertension [I10]  Yes   • Acute on chronic diastolic CHF (congestive heart failure) ([facility]) [I50.33]  Yes   • History of MRSA infection [Z86.14]  Yes      Resolved Hospital Problems   No resolved problems to display.          Hospital Course:  Otilia Givens is a 71 y.o. female with a history of A. fib, type 2 diabetes, hypertension, chronic leg wounds initially presented to outside hospital with right-sided weakness.  Began the evening prior and also associated with some slurred speech.  Head CT was negative there and NIH was 6.  Transferred to [facility] for further stroke evaluation.  Seen by stroke team with initial CT angiograms been negative.  No MRI done.  Continued to have significant right-sided weakness and felt this is representative of a stroke.  Maintained on anticoagulation.  Of note initially had a positive Covid screening test but no initial symptoms.  However the last 36 to  48 hours she has developed increasing hypoxia and evidence of increasing bilateral infiltrates on chest x-ray.  She was started on empiric antibiotics to cover for bacterial process.  Also started on  Decadron and remdesivir for COVID-19.  However unfortunately she continued to decline.  Given her overall age, comorbidities, right hemiparesis from stroke and worsening respiratory status did not feel her prognosis was good.  I discussed with daughter over the phone who has elected to proceed with comfort measures.  Seen by palliative care and hospice and arrangements are being made for transfer to the inpatient hospice service for comfort measures.  Appreciate everyone's help.    Discharge Details     Discharge Disposition:  Transfer care to inpatient Hospice at [facility]    Time Spent on Discharge:  40 minutes    Luís Galindo MD  12/08/21

## 2021-12-08 NOTE — PLAN OF CARE
Pt vss. Pt has declined this shift. Pt has been nonverbal this shift. Pt able to follow command to squeeze my hands. Pt only able to moan and groan. Pt unable to answer orientation questions. Pt was on 6L at start of shift but is now on HFNC. Pt initially placed on HFNC 40L and 60%. Pt was able to maintain oxygen for a short period of time. Pt then desatted to as low as 82%. Pt maintained this level and was unable to recover. Pt had not moved any, HFNC was still in place in nose, and pt was awake when this happened. Pt O2 requirements increased to 45L and 70%. Pt O2 is now 90%. Pt has been tachypneic and using accessory muscles. Lungs sounds are crackles and course. Pt unable to spit up sputum, suction unsuccessful. Sepsis screen performed twice and showed negative each time. Pt able to swallow better at start of shift compared to end of shift. Pt given liquid tylenol to alleviate suspected pain after a bed change/bath. Pt unable to finish because pt started coughing and could not swallow it. APRN made aware when pt was placed on HFNC and of pt's labored breathing, no new orders placed. Pt has only slept around 15 minutes this shift. Unable to obtain new IV access this shift due to poor venous access, old IV still working well. Pt tolerated first dose of Remdesivir well. Antibiotics administered also.

## 2021-12-08 NOTE — CONSULTS
Palliative Care Initial Consult Note    Patient Name:  Otilia Givens   : 1950   Sex: female    Patient Care Team:  Shannon Aly MD as PCP - General (General Practice)    Advance care planning discussed: Yes, with dtr  Code Status: COMFORT MEASURES  Advance Directive: not scanned into Epic  Surrogate decision maker: Rema weller    Referring Provider: Dr. Galindo  Reason for Consult: symptom mgmt    Subjective     71yoF admitted [facility]  for suspected CVA, Covid19, bronchopna, Afib. Currently on HFNC (FiO2 70%, 45L); continued decline over past 24-36hours with worsening respiratory failure and increased somnolence.     Palliative consulted today to assist with symptom mgmt and transition to comfort measures plan of care.     Upon visit today, pt tachypneic with accessory muscle use. Opens eyes to name but nonverbal, does not follow commands. Does not appear comfortable.     Review of Systems  Review of Systems   Unable to perform ROS: Acuity of condition       History  Past Medical History:   Diagnosis Date   • Afib (HCC)    • CHF (congestive heart failure) (HCC)    • Diabetes (HCC)    • GERD (gastroesophageal reflux disease)    • Hypertension    • Stroke (HCC)      Past Surgical History:   Procedure Laterality Date   • HYSTERECTOMY     • TOE AMPUTATION Left      Current Facility-Administered Medications   Medication Dose Route Frequency Provider Last Rate Last Admin   • acetaminophen (TYLENOL) tablet 650 mg  650 mg Oral Q4H PRN Mary Spicer APRN   650 mg at 21 0752    Or   • acetaminophen (TYLENOL) 160 MG/5ML solution 650 mg  650 mg Oral Q4H PRN Mary Spicer APRN   649.6 mg at 21 0543    Or   • acetaminophen (TYLENOL) suppository 650 mg  650 mg Rectal Q4H PRN Mary Spicer APRN       • albuterol sulfate HFA (PROVENTIL HFA;VENTOLIN HFA;PROAIR HFA) inhaler 2 puff  2 puff Inhalation 4x Daily - RT Hellen Huerta APRN   2 puff at 21 0811   • albuterol sulfate HFA (PROVENTIL  HFA;VENTOLIN HFA;PROAIR HFA) inhaler 2 puff  2 puff Inhalation Q6H PRN Delma Nj APRN       • benzonatate (TESSALON) capsule 200 mg  200 mg Oral TID PRN Hellen Huerta APRN       • morphine injection 2 mg  2 mg Intravenous Q1H PRN Luís Galindo MD   2 mg at 12/08/21 1103   • ondansetron (ZOFRAN) tablet 4 mg  4 mg Oral Q6H PRN Guerline Conley MD   4 mg at 12/03/21 0600    Or   • ondansetron (ZOFRAN) injection 4 mg  4 mg Intravenous Q6H PRN Guerline Conley MD   4 mg at 12/04/21 1752   • sodium chloride 0.9 % flush 10 mL  10 mL Intravenous Q12H Angela Evans APRN   10 mL at 12/08/21 0931   • sodium chloride 0.9 % flush 10 mL  10 mL Intravenous PRN Angela Evans APRN       • sodium chloride 0.9 % flush 10 mL  10 mL Intravenous Q12H Mary Spicer APRN   10 mL at 12/08/21 0931   • sodium chloride 0.9 % flush 10 mL  10 mL Intravenous PRN Mary Spicer APRN            •  acetaminophen **OR** acetaminophen **OR** acetaminophen  •  albuterol sulfate HFA  •  benzonatate  •  Morphine  •  ondansetron **OR** ondansetron  •  sodium chloride  •  sodium chloride  No Known Allergies  Family History   Problem Relation Age of Onset   • Hypertension Mother    • No Known Problems Father      Social History     Socioeconomic History   • Marital status:    Tobacco Use   • Smoking status: Never Smoker   • Smokeless tobacco: Never Used   Substance and Sexual Activity   • Alcohol use: Never   • Drug use: Never   • Sexual activity: Defer       Objective     Vital Signs  Temp:  [97.1 °F (36.2 °C)-97.8 °F (36.6 °C)] 97.5 °F (36.4 °C)  Heart Rate:  [74-95] 80  Resp:  [18-28] 28  BP: (119-145)/(50-93) 127/91    PPS: Palliative Performance Scale score as of 12/8/2021, 12:47 EST is 10% based on the following measures:   Ambulation: Totally bed bound  Activity and Evidence of Disease: Unable to do any work, extensive evidence of disease  Self-Care: Total care  Intake:  Mouth care only  LOC: Drowsy  or coma      Physical Exam:  Physical Exam  Constitutional:       Appearance: She is ill-appearing.      Comments: Does not appear comfortable   HENT:      Mouth/Throat:      Mouth: Mucous membranes are dry.      Comments: DRY lips, oral cavity  Eyes:      Comments: Opens eyes spontaneously but unable to assess EOM   Cardiovascular:      Comments: DHT; + radial pulses, RRR  Pulmonary:      Comments: Labored breathing; accessory muscle use. Tachypneic, RR 40-50s. A: diminished b/l bases; CTA TIMMY, coarse rhonchi RUL.  Abdominal:      Palpations: Abdomen is soft.      Comments: BSx4   Musculoskeletal:      Right lower leg: Edema present.      Left lower leg: Edema present.   Skin:     General: Skin is dry.      Coloration: Skin is pale.      Comments: BLE with chronic skin changes - pink, slightly warmer than surrounding skin to the touch; no edema   Neurological:      Comments: Does not follow commands   Psychiatric:      Comments: + facial grimacing with repositioning; no moaning during my visit          Results Review:   Lab Results   Component Value Date    HGBA1C 8.30 (H) 11/29/2021       Lab Results   Component Value Date    GLUCOSE 178 (H) 12/08/2021    BUN 67 (H) 12/08/2021    CREATININE 2.72 (H) 12/08/2021    EGFRIFNONA 17 (L) 12/08/2021    BCR 24.6 12/08/2021    K 4.8 12/08/2021    CO2 20.0 (L) 12/08/2021    CALCIUM 8.0 (L) 12/08/2021    ALBUMIN 2.30 (L) 12/08/2021    AST 88 (H) 12/08/2021    ALT 26 12/08/2021       WBC   Date Value Ref Range Status   12/08/2021 12.11 (H) 3.40 - 10.80 10*3/mm3 Final     RBC   Date Value Ref Range Status   12/08/2021 3.37 (L) 3.77 - 5.28 10*6/mm3 Final     Hemoglobin   Date Value Ref Range Status   12/08/2021 9.0 (L) 12.0 - 15.9 g/dL Final     Hematocrit   Date Value Ref Range Status   12/08/2021 27.6 (L) 34.0 - 46.6 % Final     MCV   Date Value Ref Range Status   12/08/2021 81.9 79.0 - 97.0 fL Final     MCH   Date Value Ref Range Status   12/08/2021 26.7 26.6 - 33.0 pg Final      MCHC   Date Value Ref Range Status   12/08/2021 32.6 31.5 - 35.7 g/dL Final     RDW   Date Value Ref Range Status   12/08/2021 17.1 (H) 12.3 - 15.4 % Final     RDW-SD   Date Value Ref Range Status   12/08/2021 50.5 37.0 - 54.0 fl Final     MPV   Date Value Ref Range Status   12/08/2021 9.9 6.0 - 12.0 fL Final     Platelets   Date Value Ref Range Status   12/08/2021 198 140 - 450 10*3/mm3 Final     Neutrophil %   Date Value Ref Range Status   12/07/2021 91.0 (H) 42.7 - 76.0 % Final     Lymphocyte %   Date Value Ref Range Status   12/07/2021 3.1 (L) 19.6 - 45.3 % Final     Monocyte %   Date Value Ref Range Status   12/07/2021 4.5 (L) 5.0 - 12.0 % Final     Eosinophil %   Date Value Ref Range Status   12/07/2021 0.1 (L) 0.3 - 6.2 % Final     Basophil %   Date Value Ref Range Status   12/07/2021 0.1 0.0 - 1.5 % Final     Immature Grans %   Date Value Ref Range Status   12/07/2021 1.2 (H) 0.0 - 0.5 % Final     Neutrophils Absolute   Date Value Ref Range Status   12/08/2021 11.50 (H) 1.70 - 7.00 10*3/mm3 Final     Neutrophils, Absolute   Date Value Ref Range Status   12/07/2021 11.46 (H) 1.70 - 7.00 10*3/mm3 Final     Lymphocytes, Absolute   Date Value Ref Range Status   12/07/2021 0.39 (L) 0.70 - 3.10 10*3/mm3 Final     Monocytes, Absolute   Date Value Ref Range Status   12/07/2021 0.56 0.10 - 0.90 10*3/mm3 Final     Eosinophils Absolute   Date Value Ref Range Status   12/08/2021 0.00 0.00 - 0.40 10*3/mm3 Final     Eosinophils, Absolute   Date Value Ref Range Status   12/07/2021 0.01 0.00 - 0.40 10*3/mm3 Final     Basophils Absolute   Date Value Ref Range Status   12/08/2021 0.00 0.00 - 0.20 10*3/mm3 Final     Basophils, Absolute   Date Value Ref Range Status   12/07/2021 0.01 0.00 - 0.20 10*3/mm3 Final     Immature Grans, Absolute   Date Value Ref Range Status   12/07/2021 0.15 (H) 0.00 - 0.05 10*3/mm3 Final     nRBC   Date Value Ref Range Status   12/08/2021 1.0 (H) 0.0 - 0.2 /100 WBC Final   12/07/2021 0.2 0.0 -  0.2 /100 WBC Final         Suspected subacute cerebrovascular accident (CVA)    Paroxysmal atrial fibrillation (HCC)    COVID-19 virus detected    Right sided weakness    T2DM (type 2 diabetes mellitus) (HCC)    Essential hypertension    Acute on chronic diastolic CHF (congestive heart failure) (HCC)    History of MRSA infection    Elevated serum creatinine      Assessment/Plan   Assessment/Plan:     71yoF admitted [facility] 11/29 for suspected CVA, Covid19, bronchopna, Afib. Currently on HFNC (FiO2 70%, 45L); continued decline over past 24-36hours with worsening respiratory failure and increased somnolence.     Dyspnea  Anxiety  Congestion  Constipation  AMS  EOLC    Spoke with dtr, Rema, regarding goals and plan of care. Goal at this time is for pt to be kept comfortable.    - inpt Hospice consult placed and called    - Dtr (Rema) would appreciate she and two siblings (Jahaira, Keily) being able to visit to say good-bye to their mom. They will not stay or return - they are asking for a one time visit.   *During my visit, per dtr's request I did tell pt that her children (Rema, Jahaira, Keily, and Sher) love her very much.    - discontinue inhaler - pt unable to use effectively at this time    - morphine 2mg q6h scheduled     - morphine 2-4mg prn    - ativan 0.5mg q4h prn    - lasix prn    - palliative oral rinse q6h scheduled and prn    - vaseline for lips     - recommend not titriating HFNC until dtrs have opportunity to say goodbye (if granted permission)    - NPO, advance diet as tolerated - burden > benefit for oral nutrition at this time    Coordinated care with Attending, Nursing, and Hospice    COMFORT MEASURES    Hopefully pt will transition to inpt Hospice today.     Total Visit Time: > 60min  Face to Face Time: 10min    Melony Iglesias, JEFFRY, MHA, APRN  [facility]  Hospice and Palliative Care Nurse Practitioner  12/08/21  12:34 EST

## 2021-12-08 NOTE — CASE MANAGEMENT/SOCIAL WORK
Continued Stay Note  [facility]     Patient Name: Otilia Givens  MRN: [id number]  Today's Date: 12/8/2021    Admit Date: 11/29/2021     Discharge Plan     Row Name 12/08/21 1246       Plan    Plan update    Patient/Family in Agreement with Plan yes    Plan Comments Per MDR, patient moving into comfort care, Palliative Care following.  CM will continue to follow, likely move into Hospice Care at some point.    Final Discharge Disposition Code 30 - still a patient               Discharge Codes    No documentation.               Expected Discharge Date and Time     Expected Discharge Date Expected Discharge Time    Dec 9, 2021             Elaina Madrid, RN

## 2021-12-08 NOTE — PROGRESS NOTES
[facility] Medicine Services  PROGRESS NOTE    Patient Name: Otilia Givens  : 1950  MRN: [id number]    Date of Admission: 2021  Primary Care Physician: Shannon Aly MD    Subjective   Subjective     CC:  F/U CVA    HPI:  Continues to worsen overnight.  Has been escalated to high flow oxygen at 80%.  She appears to be very uncomfortable and moans with every exhalation.  Nonverbal.  When I ask if she is in pain she points to the high flow oxygen cannula.    ROS:  Unable to obtain secondary to current condition      Objective   Objective     Vital Signs:   Temp:  [96.6 °F (35.9 °C)-97.8 °F (36.6 °C)] 97.5 °F (36.4 °C)  Heart Rate:  [74-95] 80  Resp:  [18-28] 28  BP: (119-145)/(50-93) 127/91  Flow (L/min):  [4-45] 45     Physical Exam:  Constitutional: Acutely and chronically ill-appearing  HENT: NCAT, mucous membranes moist  Respiratory: Tachypneic, on high flow oxygen at 80%, difficult to auscultate secondary to moaning  Cardiovascular: RRR, no murmurs, rubs, or gallops  Gastrointestinal: Morbidly obese positive bowel sounds, soft, nontender, nondistended  Musculoskeletal: No bilateral ankle edema  Psychiatric: Unable to assess  Neurologic: Currently nonverbal, does not move right leg or arm, follows commands on left  Skin: No rashes        Results Reviewed:  LAB RESULTS:      Lab 21  0451 21  1526 21  1021 21  0527 21  0923   WBC 12.11* 12.58* 5.49 4.67 5.45   HEMOGLOBIN 9.0* 8.9* 9.3* 12.7 12.4   HEMATOCRIT 27.6* 28.9* 28.6* 40.3 38.7   PLATELETS 198 208 188 162 161   NEUTROS ABS 11.50* 11.46*  --   --   --    IMMATURE GRANS (ABS)  --  0.15*  --   --   --    LYMPHS ABS  --  0.39*  --   --   --    MONOS ABS  --  0.56  --   --   --    EOS ABS 0.00 0.01  --   --   --    MCV 81.9 85.5 81.9 86.1 85.1   SED RATE  --  37*  --   --   --    CRP  --  21.12* 5.83*  --  3.21*   PROTIME 24.2*  --   --   --   --    D DIMER QUANT 1.70*  --   --   --   --           Lab 12/07/21 2034 12/07/21  1526 12/05/21  1021 12/04/21  0533 12/02/21  0527 12/01/21  0923 12/01/21  0923   SODIUM  --  145 140 139 137  --  133*   POTASSIUM  --  4.5 4.1 4.7 4.6  --  4.5   CHLORIDE  --  109* 106 105 102  --  101   CO2  --  23.0 24.0 21.0* 21.0*  --  23.0   ANION GAP  --  13.0 10.0 13.0 14.0  --  9.0   BUN  --  64* 49* 43* 42*  --  38*   CREATININE 2.59* 2.51* 1.66* 1.54* 1.73*   < > 1.88*   GLUCOSE  --  118* 152* 198* 185*  --  182*   CALCIUM  --  8.1* 8.2* 8.5* 8.2*  --  8.1*    < > = values in this interval not displayed.         Lab 12/07/21  1526 12/01/21  0923   TOTAL PROTEIN 6.1  6.1 6.5   ALBUMIN 2.50*  2.50* 2.90*   GLOBULIN 3.6 3.6   ALT (SGPT) 27  27 11   AST (SGOT) 88*  88* 21   BILIRUBIN 1.0  1.0 0.6   INDIRECT BILIRUBIN 0.5  --    BILIRUBIN DIRECT 0.5*  --    ALK PHOS 121*  121* 104         Lab 12/08/21  0451 12/07/21  1526   PROBNP  --  11,701.0*   PROTIME 24.2*  --    INR 2.28*  --                  Brief Urine Lab Results  (Last result in the past 365 days)      Color   Clarity   Blood   Leuk Est   Nitrite   Protein   CREAT   Urine HCG        12/01/21 1738             130.3               Microbiology Results Abnormal     Procedure Component Value - Date/Time    Blood Culture - Blood, Hand, Right [[id number]]  (Normal) Collected: 11/29/21 0536    Lab Status: Final result Specimen: Blood from Hand, Right Updated: 12/04/21 0615     Blood Culture No growth at 5 days    Blood Culture - Blood, Arm, Right [[id number]]  (Normal) Collected: 11/29/21 0337    Lab Status: Final result Specimen: Blood from Arm, Right Updated: 12/04/21 0400     Blood Culture No growth at 5 days    Urine Culture - Urine, Urine, Clean Catch [[id number]] Collected: 11/29/21 0419    Lab Status: Final result Specimen: Urine, Clean Catch Updated: 11/30/21 1503     Urine Culture 50,000 CFU/mL Mixed Joanne Isolated    Narrative:      Specimen contains mixed organisms of questionable pathogenicity which indicates  contamination with commensal jacey.  Further identification is unlikely to provide clinically useful information.  Suggest recollection.          XR Chest 1 View    Result Date: 12/7/2021  EXAMINATION: XR CHEST 1 VW-12/07/2021:  INDICATION: Decreased sats, crackles, Covid pt; R47.1-Dysarthria and anarthria; R13.11-Dysphagia, oral phase; U07.1-COVID-19.  COMPARISON: Chest x-ray 11/29/2021.  FINDINGS: Cardiac size enlarged. Bilateral pulmonary opacifications, right greater than left, consistent with progressive involvement of airspace disease, bronchopneumonia, without pleural effusion.      Impression: Bilateral pulmonary opacifications, right greater than left, with progressive involvement of airspace disease, bronchopneumonia, without pleural effusion.  D:  12/07/2021 E:  12/07/2021          Results for orders placed during the hospital encounter of 11/29/21    Adult Transthoracic Echo Complete W/ Cont if Necessary Per Protocol (With Agitated Saline)    Interpretation Summary  · Left ventricular systolic function is normal. Estimated left ventricular EF = 52%.  · Left ventricular wall thickness is consistent with mild concentric hypertrophy.  · Mild to moderate mitral valve regurgitation is present with an eccentric jet noted.  · Mild to moderate tricuspid valve regurgitation is present.  · Estimated right ventricular systolic pressure from tricuspid regurgitation is moderately elevated (45-55 mmHg).  · Saline test results are negative for right to left atrial level shunt.      I have reviewed the medications:  Scheduled Meds:albuterol sulfate HFA, 2 puff, Inhalation, 4x Daily - RT  sodium chloride, 10 mL, Intravenous, Q12H  sodium chloride, 10 mL, Intravenous, Q12H      Continuous Infusions:   PRN Meds:.•  acetaminophen **OR** acetaminophen **OR** acetaminophen  •  albuterol sulfate HFA  •  benzonatate  •  ondansetron **OR** ondansetron  •  sodium chloride  •  sodium chloride    Assessment/Plan   Assessment & Plan      Active Hospital Problems    Diagnosis  POA   • **Suspected subacute cerebrovascular accident (CVA) [R09.89]  Yes   • Elevated serum creatinine [R79.89]  Yes   • Paroxysmal atrial fibrillation (HCC) [I48.0]  Yes   • COVID-19 virus detected [U07.1]  Yes   • Right sided weakness [R53.1]  Yes   • T2DM (type 2 diabetes mellitus) ([facility]) [E11.9]  Yes   • Essential hypertension [I10]  Yes   • Acute on chronic diastolic CHF (congestive heart failure) ([facility]) [I50.33]  Yes   • History of MRSA infection [Z86.14]  Yes      Resolved Hospital Problems   No resolved problems to display.        Brief Hospital Course to date:  Otilia Givens is a 71 y.o. female with hx of HTN, DM2, PAF, GERD, TIA, and chronic venous stasis with hx of MRSA leg wounds who presents from OSH due to right sided weakness and slurred speech. Also complained of a mild cough. Apparently her right sided weakness has been going on for 2 weeks. At the OSH, CT head was negative, NIH was 6. COVID-19 was positive. Transferred to [facility] for Neurology evaluation.    Assessment/plan:    COVID-19 positive  Bilateral opacifications/bronchopneumonia.  -Initial Covid positive was felt to be asymptomatic.  -Has had clinical worsening in the last 24 to 36 hours with increasing oxygen requirements.  Personally reviewed chest x-ray from yesterday which shows diffuse bilateral infiltrates.  -Patient was started on empiric antibiotics to cover bacterial process.  Also started on remdesivir and Decadron for Covid on 12/7.  - oxygenation worse today, currently on 80% hi-flow      Right sided weakness and slurred speech  Probable subacute CVA  Hx of TIA  --CT head and CT perfusion negative. CTA head/neck showed moderate to high-grade short segment stenosis in the proximal right P2 segment, mild stenosis of proximal left posterior M2 branch.  --Has hx of recent diagnosis of Afib and had been taking Plavix at home.  --MRI brain ordered but patient states she is very claustrophobic and  even with offering pre-medication, she refused to have it done.  --Echo no evidence of PFO.  --Continue ASA, high dose statin.  --Had been started on Eliquis, Plavix discontinued.  - neuro status stable with right hemiparesis     Elevated troponin  Acute on chronic diastolic CHF  Pulmonary HTN  --Recent admission at [facility] for CHF exacerbation? Data deficit.  --Echo here reviewed: EF 52%, grade I diastolic dysfunction, mild to moderate MR and TR, moderate pulmonary HTN with RVSP 45-55 mmHg.  --s/p 20 mg IV Lasix x 1.  --Repeat troponin with normal range.  - Started Eliquis, Metoprolol bid for Afib (previously on Coreg at home).  --Tolerating home dose of Lasix.  --Resumed ACE 12/6.  BP stable.     Acute renal faulure/CKD 3  --baseline around 1.5, up to 2.6 as of yesterday    PAF  --Recent diagnosis, has been taking Plavix..  --Cardiology has seen and started on Eliquis and Metoprolol. Stopped Plavix.  --Echo results as above.  --Currently in NSR.     HTN  --resumed ACE  -- stopped hydralizine prior, watch BP.  Stable today.  --Coreg switched to Metoprolol as above.     DM2  --HbA1 8.3%.  --Glucoses remain 132-175.  Not eating today much.   -- continue current insulin regimen, no changes today     Hx of recurrent MRSA wound infection BLE  --Follows with wound care, has dressing changes/wraps every 2 days.  --Recently completed antibiotic course ~1-2 weeks ago, prior could not remember name of antibiotic.  --PT wound following here.       GOALS OF CARE:   Patient has had dramatic worsening over the last 24 to 36 hours with worsening respiratory failure and lethargy.  She appears to be uncomfortable.  I called and discussed with her daughter Rema on the phone and gave update.  At this point given her respiratory status, severe stroke and other comorbidities my recommendation would be to proceed with comfort measures at this time.  Even if she were to survive from her Covid and pneumonia quality of life given her  stroke would not be good.  Daughter agreed and wishes to proceed with comfort measures only at this point.  Without mind I will add some as needed morphine and remove any noncomfort based medications or interventions.  I will ask palliative care to assist in management.  Patient may be appropriate for inpatient hospice versus just titrating back oxygen and letting her pass.  Family is interested in and coming in to see her, I have informed the nurse to see if any exceptions could be made perhaps closer to the time of her passing to seek approval from a house supervisor.    DVT prophylaxis:  Comfort      AM-PAC 6 Clicks Score (PT): 6 (12/07/21 0804)    CODE STATUS:   Code Status and Medical Interventions:   Ordered at: 12/08/21 0751     Level Of Support Discussed With:    Health Care Surrogate     Code Status (Patient has no pulse and is not breathing):    No CPR (Do Not Attempt to Resuscitate)     Medical Interventions (Patient has pulse or is breathing):    Comfort Measures       Luís Galindo MD  12/08/21

## 2021-12-08 NOTE — DISCHARGE PLACEMENT REQUEST
"Otilia Taylor (71 y.o. Female)             Date of Birth Social Security Number Address Home Phone MRN    1950  [address] [place] [address] [phone] [id number]    Congregation Marital Status             Judaism        Admission Date Admission Type Admitting Provider Attending Provider Department, Room/Bed    21 Urgent Luís Galindo MD Dossett, Lee M, MD [facility] 6B, N634/1    Discharge Date Discharge Disposition Discharge Destination                         Attending Provider: Luís Galindo MD    Allergies: No Known Allergies    Isolation: Contact Air   Infection: COVID (confirmed) (21)   Code Status: No CPR   Advance Care Planning Activity    Ht: 170 cm (66.93\")   Wt: 113 kg (250 lb)    Admission Cmt: None   Principal Problem: Suspected subacute cerebrovascular accident (CVA) [R09.89]                 Active Insurance as of 2021     Primary Coverage     Payor Plan Insurance Group Employer/Plan Group    MEDICARE MEDICARE A & B      Payor Plan Address Payor Plan Phone Number Payor Plan Fax Number Effective Dates    [address] [phone]  2014 - None Entered    [place] [id number]       Subscriber Name Subscriber Birth Date Member ID       OTILIA TAYLOR 1950 [id number]                 Emergency Contacts      (Rel.) Home Phone Work Phone Mobile Phone    SULY SARGENT (Daughter) [phone] -- --            Emergency Contact Information     Name Relation Home Work Mobile    SULY SARGENT Daughter [phone]            Insurance Information                MEDICARE/MEDICARE A & B Phone: [phone]    Subscriber: Otilia Taylor Subscriber#: [id number]    Group#: -- Precert#: --             History & Physical      Tyler Amador MD at 21 [facility]              [facility] Medicine Services  HISTORY AND PHYSICAL    Patient Name: Otilia Taylor  : 1950  MRN: [id number]  Primary Care " "Physician: Provider, No Known  Date of admission: 11/29/2021    Subjective   Subjective     Chief Complaint:  Right sided weakness    HPI:  Otilia Givens is a 71 y.o. female with PMH significant for A. fib, DM 2, GERD, HTN, TIA, MRSA leg wounds from venous stasis, who originally presented to [facility] secondary to right-sided weakness.  Per report, patient's daughter noticed that the patient was having right-sided weakness and the patient noted she could not move her right arm or leg at 1830 yesterday evening.  Per report, the daughter went back to the patient's house around 2100 and the patient then had slurred speech.  At midnight the patient's daughter took the patient to [place] [facility]. Pt's only complaint is slurred speech; notes that her right side has felt weaker for ~ 2 weeks. Pt also c/o a \"mild\" cough w/ scant sputum production. Denies dyspnea, fever/chills, chest pain, N/V/D, abdominal pain, dysuria, worsening edema, syncope.   Wound care changes BLE dressings q 2 days; pt reports that she is intermittently on antibiotics for BLE/recurrent MRSA infection. Most recently finished course of antibiotics ~ 1-2 weeks ago. Pt unable to recall antibiotic name.   Upon arrival to the ED OSH, CT head is reportedly negative.  Her initial NIH was 6.  Her COVID-19 was reported to be positive although patient reports no COVID-19 symptoms.  Decision was made to transfer to [facility] for higher level of care.  She will be admitted to hospital medicine for further evaluation.    COVID Details:        Symptoms: [x] NONE [] Fever []  Cough [] Shortness of breath [] Change in taste or smell  The patient qualifies to receive the vaccine, but they have not yet received it.    Review of Systems   Constitutional: Negative for activity change, appetite change, chills, diaphoresis, fatigue, fever and unexpected weight change.   HENT: Negative.  Negative for congestion, postnasal drip, rhinorrhea, sinus " "pressure, sinus pain, sneezing, sore throat and trouble swallowing.    Eyes: Negative.  Negative for visual disturbance.   Respiratory: Positive for cough. Negative for chest tightness, shortness of breath and wheezing.         \"Mild\" cough w/ scant sputum production.    Cardiovascular: Negative.  Negative for chest pain, palpitations and leg swelling.   Gastrointestinal: Negative.  Negative for abdominal distention, abdominal pain, constipation, diarrhea, nausea and vomiting.   Endocrine: Negative.    Genitourinary: Negative.  Negative for decreased urine volume, difficulty urinating, dysuria, frequency, hematuria and urgency.   Musculoskeletal: Negative for arthralgias, back pain, gait problem, myalgias, neck pain and neck stiffness.   Skin: Positive for wound. Negative for color change, pallor and rash.        Chronic BLE wounds w/ wrapping.    Allergic/Immunologic: Negative.  Negative for immunocompromised state.   Neurological: Positive for facial asymmetry, speech difficulty, weakness and numbness. Negative for dizziness, tremors, seizures, syncope, light-headedness and headaches.   Hematological: Negative.  Does not bruise/bleed easily.   Psychiatric/Behavioral: Negative.  Negative for confusion. The patient is not nervous/anxious.    All other systems reviewed and are negative.     Personal History     Past Medical History:   Diagnosis Date   • Afib (HCC)    • CHF (congestive heart failure) (HCC)    • Diabetes (HCC)    • GERD (gastroesophageal reflux disease)    • Hypertension    • Stroke (HCC)      Past Surgical History:   Procedure Laterality Date   • HYSTERECTOMY     • TOE AMPUTATION Left      Family History:  family history includes Hypertension in her mother; No Known Problems in her father. Otherwise pertinent FHx was reviewed and unremarkable.     Social History:  reports that she has never smoked. She has never used smokeless tobacco. She reports that she does not drink alcohol and does not use " drugs.  Social History     Social History Narrative    Bed- bound, uses wheelchair. Lives w/ her son.      Medications:       No Known Allergies    Objective   Objective     Vital Signs:   Temp:  [97.1 °F (36.2 °C)] 97.1 °F (36.2 °C)  Heart Rate:  [76-81] 76  Resp:  [18] 18  BP: (123-137)/(83-85) 123/83    Physical Exam     Constitutional: Awake, alert; chronically ill appearing   Eyes: PERRLA, sclerae anicteric, no conjunctival injection  HENT: NCAT, mucous membranes dry/pale   Neck: Supple, no thyromegaly, no lymphadenopathy, trachea midline  Respiratory: Clear to auscultation bilaterally, nonlabored respirations   Cardiovascular: RRR, no murmurs, rubs, or gallops, trace edema BLE   Gastrointestinal: Positive bowel sounds, soft, nontender, nondistended  Musculoskeletal: Normal ROM bilaterally   Psychiatric: Appropriate affect, cooperative  Neurologic: Oriented x 3, LUE and LLE w/ 5/5 strength, RUE and RLE w/ 4/5 strength, Cranial Nerves grossly intact to confrontation, mild dysarthria   Skin: No rashes; chronic changes noted to BLE- no open ulcerations or lesions; left foot w/ decreased pulses compared to right and left foot cool to touch     Result Review:  I have personally reviewed the results from the time of this admission to 11/29/21 2:56 AM EST and agree with these findings:  []  Laboratory  []  Microbiology  []  Radiology  []  EKG/Telemetry   []  Cardiology/Vascular   []  Pathology  []  Old records  []  Other:  Most notable findings include: COVID-19 positive, WBC 3.8, HBG 11.4    LAB RESULTS:                                Microbiology Results (last 10 days)     ** No results found for the last 240 hours. **        No radiology results from the last 24 hrs      Assessment/Plan   Assessment & Plan       Right sided weakness    Atrial fibrillation (HCC)    COVID-19 virus detected    T2DM (type 2 diabetes mellitus) (HCC)    HTN (hypertension)    Personal history of transient ischemic attack (TIA), and  cerebral infarction without residual deficits    Stroke (cerebrum) (HCC)    CHF (congestive heart failure) ([facility])    History of MRSA infection     71 y.o. female with PMH significant for A. fib, DM 2, GERD, HTN, TIA, MRSA leg wounds from venous stasis, who originally presented to [facility] secondary to right-sided weakness he was found to have concern for suspected CVA and therefore transferred to [facility] for higher level of care.  Of note, patient was also found to have COVID-19.    Right-sided weakness  -Evaluation per stroke navigator  -Initial NIH at OSH was 6  -Echo in the a.m.  -CTA head/neck, CT perfusion, CT head, MRI pending  -Neurology consult in a.m.  -High-dose statin  -ASA daily  -Patient reportedly on Plavix outpatient (for new onset A.Fib ~2-3 weeks ago??)  -CBC, CMP, lipid panel, hemoglobin A1c, proBNP, PT/INR, PTT, magnesium, lactic acid, procalcitonin, BC x2 pending    COVID-19  -Asymptomatic  -Repeat COVID-19  -Covid labs now and daily    Atrial fibrillation (diagnosed within past month??)  History TIA  -No anticoagulation, has been taking Plavix  -Rate controlled    Diabetes mellitus 2  -FSBG AC at bedtime  -SS insulin  -Hemoglobin A1c    Hx of recurrent MRSA wound infection- BLE   -follows w/ wound care; has dressing/wraps changed every 2 days  -completed antibiotic course ~1-2 weeks ago for recurrent infection   -WOC consult   -lactic acid and procal pending   -blood cx pending   -neurovascular checks/doppler pulse- LLE (left foot noted to be cooler on exam w/ diminished pulses)     Hypertension  CHF  -awaiting med rec completion; will hold antihypertensives for now and allow permissive HTN pending Neurology recommendations   -hx of CHF w/ recent hospitalization at [place] for exacerbation; data deficient   -ECHO pending   -daily weights     DVT prophylaxis: Mechanical    CODE STATUS:    Code Status (Patient has no pulse and is not breathing): CPR (Attempt to Resuscitate)  Medical  Interventions (Patient has pulse or is breathing): Full Support    This note has been completed as part of a split-shared workflow.   Signature: Electronically signed by SUDHA Adame, 11/29/21, 3:45 AM EST.            Brief Attending Admission Attestation     I have seen and examined the patient, performing an independent face-to-face diagnostic evaluation with plan of care reviewed and developed with the advanced practice clinician (APC).    Old records reviewed and summarized in PM hx.    Brief Summary Statement:  71-year-old female presented from [facility] secondary to right-sided weakness along with slurred speech and facial droop noted by daughter around 6:30 PM, patient presented to ED later on at midnight.  Patient states that she has noted her right-sided weakness approximately 2 weeks ago-exact timeline could not be determined and was not a TPA candidate.  In addition to that patient's Covid test did come positive, does complain of mild dry cough, chronic dyspnea not on oxygen.  Not vaccinated.  Has a recent hospitalization secondary to CHF in mid October.  Also was found to be in paroxysmal A. fib.  Also on work-up was found to have elevated proBNP,-with no complaint of chest pain, O2 sats 95% on room air, chronic pedal edema, uses Lasix 40 mg daily.  Also had elevated troponin of 0.036,    Remainder of detailed HPI is as noted above and has been reviewed and/or edited by me for completeness.    PM HX:  Hyperlipidemia-Lipitor 40 mg daily  Hypertension-benazepril 20 mg every 12, Toprol XL 25 mg daily  GERD-Protonix 40 mg daily  CAD?/PVD -aspirin 81 mg daily, Plavix 75 mg daily  Mood disorder-Cymbalta 30 mg daily  DM 2-Lantus 60 units in the evening  CHF-Lasix 40 mg   ?  History of paroxysmal A. fib  CKD-with baseline current creatinine around 1.3  Hx diabetic ulcer-s/p amputation    Vitals:    11/29/21 0325   BP:  123/83   Pulse: 76   Resp: 18   Temp: 97.1 °F (36.2 °C)   SpO2: 100%        Attending Physical Exam:  RS-decreased air entry at the bases.  CVS- s1s2 irregular, no murmur.  ABD-distended, obese, bowel sounds positive, nontender  EXT-1+ edema, along with 2 amputation on the left foot  NEURO-awake alert speech difficult to understand, right upper as well as lower extremity weakness.      LABS:  EKG-sinus rhythm at 69 bpm, poor R wave progression in V4-V6, T wave inversion in 1 and aVL similar to old EKG that was sent from earlier today.  CT head did not show any acute disease CT perfusion normal brain CT angio head and neck showed moderate to high-grade short segment stenosis in the proximal right P2 segment, mild stenosis of proximal left posterior M2 branch.  Please see the official report    Brief Assessment/Plan  QOQ-ajlluq-mo recommendation from stroke team-currently on aspirin, Plavix will continue it.  Acute CHF on chronic diastolic dysfunction, chest x-ray still pending, will continue Lasix 20 mg IV daily  A. fib, CHADS2 VASC= 7, likely will benefit from long-term anticoagulation waiting for MRI brain before starting it, cardiology input.  Positive troponin in the setting of CKD without any complaint of chest pain does not appear to have new EKG changes-continue to monitor the trend, echo in a.m., cardiology consult.  Covid test positive at external ER-repeating over here, no major symptoms, O2 requirement minimal 95% on 2 L nasal cannula.  Continue to monitor, steroids not initiated currently.    See above for further detailed assessment and plan developed with APC which I have reviewed and/or edited for completeness.        Admission Status: I believe that this patient meets inpatient status secondary to acute CVA.        Electronically signed by Tyler Amador MD at 11/29/21 3638

## 2021-12-08 NOTE — PLAN OF CARE
Goal Outcome Evaluation:            Pt is on HFNC at 40L and 70% and is currently NSR on tele. She has continued declining and has been moved to Hospice. Family is in the room with patient at this time. Will cont to monitor.

## 2021-12-08 NOTE — SIGNIFICANT NOTE
12/08/21 0824   SLP Deferred Reason   SLP Deferred Reason Routine  (Pt w/ change in status overnight, now comfort measures. Spoke w/ RN. Will hold SLP eval for now.)

## 2021-12-08 NOTE — PROGRESS NOTES
Continued Stay Note  [facility]     Patient Name: Otilia Givens  MRN: [id number]  Today's Date: 12/8/2021    Admit Date: 12/8/2021     Discharge Plan     Row Name 12/08/21 1310       Plan    Plan Inpatient admission    Plan Comments Patient admitted to inpatient hospice today. POC formulated with the family and new orders received. Dr. Galindo aware of admission. Sher davis supervisor has approved the 3 daughter to visit and say good bye to their mom.    Final Discharge Disposition Code 51 - hospice medical facility               Discharge Codes    No documentation.                     Odalys Gambino RN

## 2021-12-08 NOTE — INTERVAL H&P NOTE
Pt admitted to [facility] on 12/8/2021. Please see Hospice documentation for further information.

## 2021-12-09 NOTE — DISCHARGE SUMMARY
Date of Death:  12/9/2021  Time of Death:  0644    Presenting Problem/History of Present Illness    Acute hypoxemic respiratory failure due to COVID-19 ([facility])      Hospital Course    Per Hospitalist Discharge Summary:    Otilia Givens is a 71 y.o. female with a history of A. fib, type 2 diabetes, hypertension, chronic leg wounds initially presented to outside hospital with right-sided weakness.  Began the evening prior and also associated with some slurred speech.  Head CT was negative there and NIH was 6.  Transferred to [facility] for further stroke evaluation.  Seen by stroke team with initial CT angiograms been negative.  No MRI done.  Continued to have significant right-sided weakness and felt this is representative of a stroke.  Maintained on anticoagulation. Of note initially had a positive Covid screening test but no initial symptoms.  However the last 36 to 48 hours she has developed increasing hypoxia and evidence of increasing bilateral infiltrates on chest x-ray.  She was started on empiric antibiotics to cover for bacterial process.  Also started on  Decadron and remdesivir for COVID-19.  However unfortunately she continued to decline.  Given her overall age, comorbidities, right hemiparesis from stroke and worsening respiratory status did not feel her prognosis was good.  I discussed with daughter over the phone who has elected to proceed with comfort measures.  Seen by palliative care and hospice and arrangements are being made for transfer to the inpatient hospice service for comfort measures.  Appreciate everyone's help.    [end of copied text]    Ms. Givens was admitted to in Hospice on 12/8/2021 for mgmt of acute symptoms 2/2 acute hypoxic respiratory failure 2/2 Covid19.     Social History:  Social History     Tobacco Use   • Smoking status: Never Smoker   • Smokeless tobacco: Never Used   Substance Use Topics   • Alcohol use: Never         Consults:   Consults     Date and Time Order Name Status  Description    12/8/2021  7:51 AM Inpatient Palliative Care MD Consult Completed     11/29/2021  5:16 AM Inpatient Cardiology Consult Completed     11/29/2021  2:04 AM Inpatient Neurology Consult Stroke Completed         Exam confirms with auscultation zero audible heart tones and zero audible respirations. Ms.Carol Givens was pronounced dead at 0644.  MD notified by Patient's RN.     Lou Ohara RN  Clinical House Supervisor  12/9/2021       Melony Iglesias, JEFFRY, MHA, APRN  [facility]  Hospice and Palliative Care Nurse Practitioner  12/09/21  12:42 EST                   ]

## 2021-12-09 NOTE — NURSING NOTE
Pt  at 0648. Rust and IV removed. Post mortem care finished. Family notified. No personal items at bedside.

## 2021-12-09 NOTE — SIGNIFICANT NOTE
Exam confirms with auscultation zero audible heart tones and zero audible respirations. Ms.Carol Givens was pronounced dead at 0644.  MD notified by Patient's RN.    Lou Ohara RN  Clinical House Supervisor  12/9/2021

## 2021-12-12 LAB
BACTERIA SPEC AEROBE CULT: NORMAL
BACTERIA SPEC AEROBE CULT: NORMAL

## 2024-03-20 NOTE — PROGRESS NOTES
"                  Clinical Nutrition     Reason for Visit:   LOS, Difficulty chewing/swallowing      Patient Name: Otilia Givens  YOB: 1950  MRN: [id number]  Date of Encounter: 12/07/21 15:43 EST  Admission date: 11/29/2021     Nutrition Assessment   Assessment     Suspected subacute cerebrovascular accident (CVA)    Paroxysmal atrial fibrillation (HCC)    COVID-19 virus detected    Right sided weakness    T2DM (type 2 diabetes mellitus) (HCC)    Essential hypertension    Acute on chronic diastolic CHF (congestive heart failure) (HCC)    History of MRSA infection    Elevated serum creatinine    PMH: She  has a past medical history of Afib (HCC), CHF (congestive heart failure) (HCC), Diabetes (HCC), GERD (gastroesophageal reflux disease), Hypertension, and Stroke (HCC).   PSxH: She  has a past surgical history that includes Toe amputation (Left) and Hysterectomy.       Reported/Observed/Food/Nutrition Related History:     Pt resting in bed, on nasal cannula, will not respond to questions    Per nursing tech: pt has had change in status over past 36 hours, is no longer able to feed herself, does not follow commands, just calls out \"help\"    Per RN: pt took her meds in applesauce    Anthropometrics     Height: 67in  Last filed wt: 250lb  BMI: 39  Obese Class I: 30-34.9kg/m2           Last 15 Recorded Weights  View Complete Flowsheet  Weight Weight (kg) Weight (lbs) Weight Method   12/7/2021 113.399 kg 250 lb -   12/3/2021 113.2 kg 249 lb 9 oz -   12/2/2021 113.6 kg 250 lb 7.1 oz Bed scale   12/1/2021 113.399 kg 250 lb -   11/30/2021 113.535 kg 250 lb 4.8 oz -   11/29/2021 113.399 kg 250 lb -   11/29/2021 113.49 kg 250 lb 3.2 oz -   11/29/2021 113.626 kg 250 lb 8 oz Bed scale       Labs reviewed     Results from last 7 days   Lab Units 12/05/21  1021 12/04/21  0533 12/02/21  0527 12/01/21  0923 12/01/21  0923   GLUCOSE mg/dL 152* 198* 185*   < > 182*   BUN mg/dL 49* 43* 42*   < > 38*   CREATININE mg/dL " 1.66* 1.54* 1.73*   < > 1.88*   SODIUM mmol/L 140 139 137   < > 133*   CHLORIDE mmol/L 106 105 102   < > 101   POTASSIUM mmol/L 4.1 4.7 4.6   < > 4.5   ALT (SGPT) U/L  --   --   --   --  11    < > = values in this interval not displayed.     Results from last 7 days   Lab Units 12/05/21  1021 12/01/21  0923   ALBUMIN g/dL  --  2.90*   CRP mg/dL 5.83* 3.21*       Results from last 7 days   Lab Units 12/07/21  1219 12/07/21  0817 12/06/21  2058 12/06/21  1806 12/06/21  1221 12/06/21  0846   GLUCOSE mg/dL 127 132* 152* 133* 175* 143*     Lab Results   Lab Value Date/Time    HGBA1C 8.30 (H) 11/29/2021 0338         Medications reviewed   Pertinent:abx, eliquis, lasix, protonix, bowel regimen      Intake/Ouptut 24 hrs (7:00AM - 6:59 AM)     Intake & Output (last day)       12/06 0701 12/07 0700 12/07 0701  12/08 0700    P.O. 118     Total Intake(mL/kg) 118 (1)     Urine (mL/kg/hr)      Stool      Total Output      Net +118           Urine Unmeasured Occurrence 2 x     Stool Unmeasured Occurrence 1 x                GI: wnl    SKIN:BLE cellulitis, L. groin MASD, L. gluteal MASD    Needs Assessment 12-7-21       Height used 67in   Weight used ABW 250lb   IBW 135lb     Estimated need Method/Equation used Result    Energy/Calorie need   kcals/d 14-20kcal per kg ABW 1591-2273kcal    MSJ ABW 1679kcal   Goal ~17ookcal     Protein   g/day 1-1.2g protein per kg 114-136g protein    2g protein per kg IBW 123g protein   goal ~114 g with current renal function                      Current Nutrition Prescription     PO: Dysphagia 3- Cardiac Diabetic/ thin liquids    Intake: 45% of 11 meals      Nutrition Diagnosis     12-7-21  Problem Inadequate oral intake   Etiology Per Clinical Status   Signs/Symptoms Po intake 45%     Problem Swallowing difficulty   Etiology Suspected CVA   Signs/Symptoms Per SLP EVal       Nutrition Intervention   1.  Follow treatment progress, Supplement provided    Magic Cups 3x/day    Await SLP  re-evaluation    Decline in status, If pt fails next dysphagia eval, consider EN depending on GOC    TF recs if needed: Replete with fiber @80ml, free water @15ml/hr       At Target Goal Volume     % Est needs   Volume  1760ml     Energy/kcals 1760kcals 104%   Protein 113g pro 99%   Fiber 27g         Fluid via EN 1461mL    Total Fluid  1791    Meets 100% RDI yes          Goal:   General: Nutrition support treatment    Monitoring/Evaluation:   Per protocol, I&O, PO intake, Pertinent labs, Weight, Skin status, GI status, Symptoms, Swallow function      Candace Gayle RD, CNSC  Time Spent: 60min       I attest my time as PA/NP is greater than 50% of the total combined time spent on qualifying patient care activities by the PA/NP and attending.